# Patient Record
Sex: FEMALE | Race: WHITE | NOT HISPANIC OR LATINO | Employment: OTHER | URBAN - METROPOLITAN AREA
[De-identification: names, ages, dates, MRNs, and addresses within clinical notes are randomized per-mention and may not be internally consistent; named-entity substitution may affect disease eponyms.]

---

## 2023-01-16 DIAGNOSIS — M17.12 PRIMARY OSTEOARTHRITIS OF LEFT KNEE: Primary | ICD-10-CM

## 2023-01-16 RX ORDER — FOLIC ACID 1 MG/1
1 TABLET ORAL DAILY
Qty: 30 TABLET | Refills: 1 | Status: SHIPPED | OUTPATIENT
Start: 2023-01-16 | End: 2023-03-14

## 2023-01-16 RX ORDER — MULTIVIT WITH MINERALS/LUTEIN
1000 TABLET ORAL DAILY
Qty: 30 TABLET | Refills: 1 | Status: SHIPPED | OUTPATIENT
Start: 2023-01-16 | End: 2023-03-14

## 2023-01-16 RX ORDER — MULTIVITAMIN
1 TABLET ORAL DAILY
Qty: 30 TABLET | Refills: 1 | Status: SHIPPED | OUTPATIENT
Start: 2023-01-16 | End: 2023-03-14

## 2023-04-04 ENCOUNTER — APPOINTMENT (OUTPATIENT)
Dept: PHYSICAL THERAPY | Facility: CLINIC | Age: 68
End: 2023-04-04

## 2023-04-10 ENCOUNTER — APPOINTMENT (OUTPATIENT)
Dept: PHYSICAL THERAPY | Facility: CLINIC | Age: 68
End: 2023-04-10

## 2023-04-24 ENCOUNTER — OFFICE VISIT (OUTPATIENT)
Dept: PHYSICAL THERAPY | Facility: CLINIC | Age: 68
End: 2023-04-24

## 2023-04-24 ENCOUNTER — OFFICE VISIT (OUTPATIENT)
Dept: OBGYN CLINIC | Facility: CLINIC | Age: 68
End: 2023-04-24

## 2023-04-24 VITALS
DIASTOLIC BLOOD PRESSURE: 81 MMHG | WEIGHT: 183 LBS | BODY MASS INDEX: 33.68 KG/M2 | HEART RATE: 81 BPM | HEIGHT: 62 IN | SYSTOLIC BLOOD PRESSURE: 129 MMHG

## 2023-04-24 DIAGNOSIS — M17.12 OSTEOARTHRITIS OF LEFT KNEE, UNSPECIFIED OSTEOARTHRITIS TYPE: Primary | ICD-10-CM

## 2023-04-24 DIAGNOSIS — Z96.652 STATUS POST TOTAL LEFT KNEE REPLACEMENT: ICD-10-CM

## 2023-04-24 DIAGNOSIS — Z48.89 AFTERCARE FOLLOWING SURGERY: Primary | ICD-10-CM

## 2023-04-24 DIAGNOSIS — Z96.652 TOTAL KNEE REPLACEMENT STATUS, LEFT: ICD-10-CM

## 2023-04-24 NOTE — PROGRESS NOTES
"Patient Name:  Puja Weinstein  MRN:  [id number]    [address] [place] [address]  Aftercare following surgery    2  Status post total left knee replacement      Approximately 6 week s/p Left total knee arthroplasty performed on 03/15/2023  · Overall, patient doing well s/p surgical intervention  · Strongly advised patient to continue to work on range of motion, specifically knee extension with hamstring exercises performed 3 times daily  Continue flexion as tolerated  · Continue home exercises as prescribed by outpatient PT   · Patient does not have to continue DVT prophylaxis at this time  · Follow up in office in 6 weeks for reevaluation and new x-rays upon arrival    History of the Present Illness   Puja Weinstein is a 76 y o  female approximately 6 week s/p Left total knee arthroplasty performed on 03/15/2023  Today, patient reports she is doing well  She admits to improvement of knee pain since before surgery, but today is a little sore from therapy  She is going to PT after this appointment  Patient admits to feeling a little stiff in the Left knee, but she is doing all the home exercises as prescribed  She is out of the baby aspirin and wants to know if she needs to continue with this medication  Review of Systems     Review of Systems   Constitutional: Negative for chills and fever  HENT: Negative for congestion  Respiratory: Negative for cough, chest tightness and shortness of breath  Cardiovascular: Negative for chest pain and palpitations  Gastrointestinal: Negative for abdominal pain  Endocrine: Negative for cold intolerance and heat intolerance  Neurological: Negative for syncope  Psychiatric/Behavioral: Negative for confusion  Physical Exam     /81   Pulse 81   Ht 5' 2\" (1 575 m)   Wt 83 kg (183 lb)   BMI 33 47 kg/m²     Left Knee  Surgical incision well healing, without signs of infection  Range of motion from 7-8 to 95 degrees  There is no effusion      The " patient is able to perform a straight leg raise with 4+/5 quad strength  Mild extension lag   Calf soft, compressible and nontender  The patient is neurovascular intact distally  Data Review     I have personally reviewed pertinent films in PACS, and my interpretation follows      No new images    Social History     Tobacco Use   • Smoking status: Never   • Smokeless tobacco: Never   Vaping Use   • Vaping Use: Never used   Substance Use Topics   • Alcohol use: Never   • Drug use: No           Procedures  None     Bev Rogers PA-C

## 2023-04-24 NOTE — PROGRESS NOTES
"Daily Note     Today's date: 2023  Patient name: Cornel Love  : 1955  MRN: [id number]  Referring provider: Myrtle Carlson DO  Dx:   Encounter Diagnosis     ICD-10-CM    1  Osteoarthritis of left knee, unspecified osteoarthritis type  M17 12       2  Total knee replacement status, left  Z96 652                      Subjective: Patient reports her surgeon would like her to continue working on ROM  Pt denies complications following last visit  Objective: See treatment diary below      Assessment: AROM 8-106*, PROM 4-113*  Continues to lack TKE during CKC  Unequal weight distribution during mini squats (L>R)  Patient completes charted exercises without complications  Will continue to progress ROM and strength as able in order to improve overall function for daily activities  Plan: Continue per plan of care        Precautions: pre-op Left TKA pending 3-15-23 secondary to severe DJD      Manuals 3/8 3/20 3/23 3/30 4/6 4/11 4/13 4/17 4/20 4/24   PROM L knee  SD MM AF SC AFB SK AF AF AF   Patellar ROM  nv MM  SC AFB SK AF AF AF                             Neuro Re-Ed             QS, GS 3s x 20 5s x 20 5s x 20 x20 5\"x20 5\"x20 5\"x20 5\"x20 5\"x20 5\"x20   TKE         GTB x20 GTB x20   Biodex LOS         Static x3 L12 x3                                          Ther Ex             bike  nv 5' 5' ROM 5' ROM 5' ROM 5' ROM 5' ROM 5' ROM 5' ROM   Supine abd 20x            Supine heel slides 20x 10s x 10 10s x10 x20 10\"x10 AA  x20 A 10s x10 10s x10 x20 A/AA x20 A/AA x20 A/AA   saq   5\" 2x10 5\"x20    2# 2x10 2# 2x10 3# 3x10   slr      w QS  4x5 w QS 2x10 2x10 2x10 2# 2x10   Calf stretch  10s x 10 10s x10          Hamstring stretch  10s x 10 10s x10 30\"x3         LAQ     2x10 2x10 2x10 2# 2x10 2# 3x10 3# 3x10   Bridges             HR   20x          Hamstring curl   2x10 ea   2x10 bilat 3x10 bilat 3x10 b/l     Standing hip 3 way      abd/  Ext  2x10 bilat abd/  Ext  3x10  bilat abd/ext 3x10 b/l RTB 2x10 " "RTB 2x10   Mini squat         2x10 2x10                             Ther Activity             Step ups        2x10 4\" 6\" 2X10 6\" 2x10   Sit to stands        2x10 2x10 2x10   Gait Training                                       Modalities             Cp     10'                           "

## 2023-04-27 ENCOUNTER — APPOINTMENT (OUTPATIENT)
Dept: PHYSICAL THERAPY | Facility: CLINIC | Age: 68
End: 2023-04-27

## 2023-04-28 ENCOUNTER — OFFICE VISIT (OUTPATIENT)
Dept: PHYSICAL THERAPY | Facility: CLINIC | Age: 68
End: 2023-04-28

## 2023-04-28 DIAGNOSIS — Z96.652 TOTAL KNEE REPLACEMENT STATUS, LEFT: ICD-10-CM

## 2023-04-28 DIAGNOSIS — M17.12 OSTEOARTHRITIS OF LEFT KNEE, UNSPECIFIED OSTEOARTHRITIS TYPE: Primary | ICD-10-CM

## 2023-04-28 NOTE — PROGRESS NOTES
"Daily Note     Today's date: 2023  Patient name: Peyton Santos  : 1955  MRN: [id number]  Referring provider: Quinton Arevalo DO  Dx:   Encounter Diagnosis     ICD-10-CM    1  Osteoarthritis of left knee, unspecified osteoarthritis type  M17 12       2  Total knee replacement status, left  Z96 652                      Subjective: Pt offers no new complaints regarding L knee upon arrival to session  Objective: See treatment diary below      Assessment:  AROM L knee 8-104*, PROM 0-115*  Pt with lateral trunk lean during standing hip strengthening  Requires VC to avoid quad dominant squats and knee valgus  Patient completes charted exercises without c/o pain or discomfort  Pt would benefit from continued PT in order to improve LLE strength and stability for improved function and safety during daily activities  Plan: Continue per plan of care        Precautions: pre-op Left TKA pending 3-15-23 secondary to severe DJD      Manuals 4/28 3/20 3/23 3/30 4/6 4/11 4/13 4/17 4/20 4/24   PROM L knee AF SD MM AF SC AFB SK AF AF AF   Patellar ROM  nv MM  SC AFB SK AF AF AF                             Neuro Re-Ed             QS, GS  5s x 20 5s x 20 x20 5\"x20 5\"x20 5\"x20 5\"x20 5\"x20 5\"x20   TKE BTB x20        GTB x20 GTB x20   Biodex LOS L12 x3        Static x3 L12 x3                                          Ther Ex             bike 5' ROM nv 5' 5' ROM 5' ROM 5' ROM 5' ROM 5' ROM 5' ROM 5' ROM   Supine abd             Supine heel slides x20 A/AA 10s x 10 10s x10 x20 10\"x10 AA  x20 A 10s x10 10s x10 x20 A/AA x20 A/AA x20 A/AA   saq 3# 3x10  5\" 2x10 5\"x20    2# 2x10 2# 2x10 3# 3x10   slr 2# 2x10     w QS  4x5 w QS 2x10 2x10 2x10 2# 2x10   Calf stretch  10s x 10 10s x10          Hamstring stretch 30\"x3 10s x 10 10s x10 30\"x3         LAQ 3# 3x10    2x10 2x10 2x10 2# 2x10 2# 3x10 3# 3x10   Bridges             HR   20x          Hamstring curl   2x10 ea   2x10 bilat 3x10 bilat 3x10 b/l     Standing hip 3 way " "RTB 2x10     abd/  Ext  2x10 bilat abd/  Ext  3x10  bilat abd/ext 3x10 b/l RTB 2x10 RTB 2x10   Mini squat 2x10        2x10 2x10   Leg press 75# 3x10                         Ther Activity             Step ups 6\" 2x10       2x10 4\" 6\" 2X10 6\" 2x10   Sit to stands 2x12       2x10 2x10 2x10   Gait Training                                       Modalities             Cp     10'                           "

## 2023-05-01 ENCOUNTER — EVALUATION (OUTPATIENT)
Dept: PHYSICAL THERAPY | Facility: CLINIC | Age: 68
End: 2023-05-01

## 2023-05-01 DIAGNOSIS — Z96.652 TOTAL KNEE REPLACEMENT STATUS, LEFT: ICD-10-CM

## 2023-05-01 DIAGNOSIS — M17.12 OSTEOARTHRITIS OF LEFT KNEE, UNSPECIFIED OSTEOARTHRITIS TYPE: Primary | ICD-10-CM

## 2023-05-01 NOTE — PROGRESS NOTES
PT Re-Evaluation     Today's date: 2023  Patient name: Kandy Wagner  : 1955  MRN: [id number]  Referring provider: Thu Lo DO  Dx:   Encounter Diagnosis     ICD-10-CM    1  Osteoarthritis of left knee, unspecified osteoarthritis type  M17 12       2  Total knee replacement status, left  Z96 652           Start Time: 1630  Stop Time: 5160  Total time in clinic (min): 45 minutes    Assessment  Assessment details: Pt is a 76 y o  female presenting to PT services s/p L TKA on 3/15/2023  Pt has been participating in PT for 6 weeks and has made notable improvement in regards to L knee mobility, LLE strength, decreased pain levels, and improved functional ability  Pt is no longer considered a fall risk, evidenced by score on TUG without AD  Although improved, pt continues to be limited by L knee AROM and is lacking both flexion and TKE  Additionally, pt has difficulty ascending and descending stairs and performs this non-reciprocally  PT and pt have discussed and agreed that pt is a good candidate for continued skilled physical therapy in order to improve L knee mobility, decrease pain with function, increase LLE strength, and improve functional ability in preparation for discharge to independent [facility]  Impairments: abnormal or restricted ROM, activity intolerance, impaired balance, impaired physical strength and pain with function  Understanding of Dx/Px/POC: good  Goals  STG   1  Patient will demonstrate independence and competence with HEP 2 -4 weeks GOAL MET  2  Patient will report > 25-50% reduced pain 2-4 weeks GOAL MET  3  Patient will demonstrate L knee ROM  degrees in 4 weeks  GOAL MET  4  Patient to improve L hip and knee strength by 1/2 grade  GOAL MET    LTG   1  Patient will report improvements with both functional and recreational abilities  4-6 weeks GOAL MET  2  Patient will demonstrate improved motor function  4-6 weeks  GOAL MET  3   Restoration of full / functional painfree left knee AROM (0-120)  6-10 weeks  ONGOING  4  Patient to negotiate stairs reciprocally  By DC  ONGOING  5  Patient to ambulate community distances without use of AD  By DC PARTIALLY MET  6  Patient to increase strength in L knee and hip by 1 full grade to improve ability to perform STS transfers  By Mihai Brooks  Patient would benefit from: skilled physical therapy  Planned modality interventions: cryotherapy  Planned therapy interventions: IADL retraining, manual therapy, patient education, postural training, strengthening, stretching, therapeutic exercise, flexibility, home exercise program, neuromuscular re-education and gait training  Frequency: 2-1x/week  Duration in weeks: 6  Plan of Care beginning date: 2023  Plan of Care expiration date: 2023  Treatment plan discussed with: patient        Subjective Evaluation    History of Present Illness  Mechanism of injury: Pt reports that she feels about 80% better since beginning PT  Pt states that her knee movement has gotten better but it still gets stiff in there a lot  Pt states that her strength is good  Pt states that she really doesn't use her SPC anymore and she feels good without it, she does take it if it's raining     Pain  Current pain rating: 3  At best pain ratin  At worst pain ratin  Location: anterior L knee   Quality: tight and pulling  Relieving factors: ice (working out, walking, Tylenol prn)  Aggravating factors: stair climbing (prolonged sitting)  Progression: improved    Social Support  Lives with: adult children (will be alone most of the time after surgery )    Treatments  Current treatment: physical therapy  Patient Goals  Patient goals for therapy: decreased pain, increased motion and independence with ADLs/IADLs          Objective     Active Range of Motion   Left Knee   Flexion: 112 (pull) degrees   Extension: 8 degrees     Right Knee   Flexion: 118 degrees   Extension: 0 degrees     Passive Range of Motion "  Left Knee   Flexion: 118 degrees   Extension: 0 degrees     Strength/Myotome Testing     Left Knee   Flexion: 5  Extension: 5  Quadriceps contraction: good    Right Knee   Normal strength  Flexion: 5  Extension: 5  Quadriceps contraction: good    General Comments:      Knee Comments  TUG  10 22 seconds no AD              Precautions: Left TKA 3-15-23 secondary to severe DJD      Manuals 4/28 5/1 3/23 3/30 4/6 4/11 4/13 4/17 4/20 4/24   PROM L knee AF SC MM AF SC AFB SK AF AF AF   Patellar ROM   MM  SC AFB SK AF AF AF                             Neuro Re-Ed             QS, GS  5\"x20 5s x 20 x20 5\"x20 5\"x20 5\"x20 5\"x20 5\"x20 5\"x20   TKE BTB x20        GTB x20 GTB x20   Biodex LOS L12 x3        Static x3 L12 x3                                          Ther Ex             bike 5' ROM 6' ROM endurance 5' 5' ROM 5' ROM 5' ROM 5' ROM 5' ROM 5' ROM 5' ROM   Supine abd             Supine heel slides x20 A/AA x20 AA 10s x10 x20 10\"x10 AA  x20 A 10s x10 10s x10 x20 A/AA x20 A/AA x20 A/AA   saq 3# 3x10  5\" 2x10 5\"x20    2# 2x10 2# 2x10 3# 3x10   slr 2# 2x10 3x10    w QS  4x5 w QS 2x10 2x10 2x10 2# 2x10   Calf stretch   10s x10          Hamstring stretch 30\"x3 supine c strap 30\"x3 10s x10 30\"x3         LAQ 3# 3x10    2x10 2x10 2x10 2# 2x10 2# 3x10 3# 3x10   Bridges             HR   20x          Hamstring curl   2x10 ea   2x10 bilat 3x10 bilat 3x10 b/l     Standing hip 3 way RTB 2x10     abd/  Ext  2x10 bilat abd/  Ext  3x10  bilat abd/ext 3x10 b/l RTB 2x10 RTB 2x10   Mini squat 2x10        2x10 2x10   Leg press 75# 3x10 75# x25                        Ther Activity             Step ups 6\" 2x10       2x10 4\" 6\" 2X10 6\" 2x10   Sit to stands 2x12       2x10 2x10 2x10   Gait Training                                       Modalities             Cp     10'                           "

## 2023-05-04 ENCOUNTER — OFFICE VISIT (OUTPATIENT)
Dept: PHYSICAL THERAPY | Facility: CLINIC | Age: 68
End: 2023-05-04

## 2023-05-04 DIAGNOSIS — M17.12 OSTEOARTHRITIS OF LEFT KNEE, UNSPECIFIED OSTEOARTHRITIS TYPE: Primary | ICD-10-CM

## 2023-05-04 DIAGNOSIS — Z96.652 TOTAL KNEE REPLACEMENT STATUS, LEFT: ICD-10-CM

## 2023-05-04 NOTE — PROGRESS NOTES
"Daily Note     Today's date: 2023  Patient name: Abdiel Ace  : 1955  MRN: [id number]  Referring provider: Heather Meade DO  Dx:   Encounter Diagnosis     ICD-10-CM    1  Osteoarthritis of left knee, unspecified osteoarthritis type  M17 12       2  Total knee replacement status, left  Z96 652                      Subjective: Pt feels L knee is a little stiff upon arrival   Denies soreness following last session  Objective: See treatment diary below      Assessment: AROM L knee *  Requires TC to facilitate TKE during functional activities  Able to perform step up without hand rail but close supervision provided for safety due to lack of motor control  Continued quad strength and ROM deficits  Pt would benefit from continued PT in order to improve L knee ROM and strength for improved function during daily activities  Plan: Continue per plan of care        Precautions: Left TKA 3-15-23 secondary to severe DJD      Manuals 4/28 5/1 5/4 3/30 4/6 4/11 4/13 4/17 4/20 4/24   PROM L knee AF SC AF AF SC AFB SK AF AF AF   Patellar ROM     SC AFB SK AF AF AF                             Neuro Re-Ed             QS, GS  5\"x20  x20 5\"x20 5\"x20 5\"x20 5\"x20 5\"x20 5\"x20   TKE BTB x20  BTB x20      GTB x20 GTB x20   Biodex LOS L12 x3  L12 x4      Static x3 L12 x3                                          Ther Ex             bike 5' ROM 6' ROM endurance 5' ROM 5' ROM 5' ROM 5' ROM 5' ROM 5' ROM 5' ROM 5' ROM   Supine abd             Supine heel slides x20 A/AA x20 AA x20 AA/A x20 10\"x10 AA  x20 A 10s x10 10s x10 x20 A/AA x20 A/AA x20 A/AA   saq 3# 3x10  4# 3x10 5\"x20    2# 2x10 2# 2x10 3# 3x10   slr 2# 2x10 3x10    w QS  4x5 w QS 2x10 2x10 2x10 2# 2x10   Calf stretch             Hamstring stretch 30\"x3 supine c strap 30\"x3 30\"x3 30\"x3         LAQ 3# 3x10  4# 3x10  2x10 2x10 2x10 2# 2x10 2# 3x10 3# 3x10   Bridges             HR             Hamstring curl      2x10 bilat 3x10 bilat 3x10 b/l   " "  Standing hip 3 way RTB 2x10  RTB 2x10   abd/  Ext  2x10 bilat abd/  Ext  3x10  bilat abd/ext 3x10 b/l RTB 2x10 RTB 2x10   Mini squat 2x10  TRX 2x10      2x10 2x10   Leg press 75# 3x10 75# x25 75# 3x10                       Ther Activity             Step ups 6\" 2x10  8\" 2x10     2x10 4\" 6\" 2X10 6\" 2x10   Sit to stands 2x12  2x12     2x10 2x10 2x10   Sled pull   75# 2 laps          Gait Training                                       Modalities             Cp     10'                                "

## 2023-05-08 ENCOUNTER — APPOINTMENT (OUTPATIENT)
Dept: PHYSICAL THERAPY | Facility: CLINIC | Age: 68
End: 2023-05-08
Payer: COMMERCIAL

## 2023-05-11 ENCOUNTER — OFFICE VISIT (OUTPATIENT)
Dept: PHYSICAL THERAPY | Facility: CLINIC | Age: 68
End: 2023-05-11

## 2023-05-11 DIAGNOSIS — Z96.652 TOTAL KNEE REPLACEMENT STATUS, LEFT: ICD-10-CM

## 2023-05-11 DIAGNOSIS — M17.12 OSTEOARTHRITIS OF LEFT KNEE, UNSPECIFIED OSTEOARTHRITIS TYPE: Primary | ICD-10-CM

## 2023-05-11 NOTE — PROGRESS NOTES
"Daily Note     Today's date: 2023  Patient name: Rosie Frazier  : 1955  MRN: [id number]  Referring provider: Juaquin Pope DO  Dx:   Encounter Diagnosis     ICD-10-CM    1  Osteoarthritis of left knee, unspecified osteoarthritis type  M17 12       2  Total knee replacement status, left  Z96 652           Start Time: 1630  Stop Time: 1716  Total time in clinic (min): 46 minutes    Subjective: Pt reports that the only issue she is having anymore is when she sits for too long her knee gets stiff  Objective: See treatment diary below      Assessment: AROM L knee 4-107*  PROM 0*-110*  Pt is progressing well, will continue to address functional quadriceps strengthening  Pt continues to lack TKE  Pt would benefit from continued PT in order to improve L knee ROM and strength for improved function during daily activities  Plan: Continue per plan of care        Precautions: Left TKA 3-15-23 secondary to severe DJD    POC expires Auth Status Unit limit Start date  Expiration date PT/OT + Visit Limit?   23 Approved - 12 visits  BOMN 23 BOMN                                         Manuals    PROM L knee AF SC AF  SC AFB SK AF AF AF   Patellar ROM     SC AFB SK AF AF AF                             Neuro Re-Ed             QS, GS  5\"x20  Prone 5\"x20 5\"x20 5\"x20 5\"x20 5\"x20 5\"x20 5\"x20   TKE BTB x20  BTB x20 BTB x20     GTB x20 GTB x20   Biodex LOS L12 x3  L12 x4 L11/10 x4     Static x3 L12 x3                                          Ther Ex             bike 5' ROM 6' ROM endurance 5' ROM 5' ROM 5' ROM 5' ROM 5' ROM 5' ROM 5' ROM 5' ROM   Supine abd             Supine heel slides x20 A/AA x20 AA x20 AA/A x20 AA/A 10\"x10 AA  x20 A 10s x10 10s x10 x20 A/AA x20 A/AA x20 A/AA   saq 3# 3x10  4# 3x10     2# 2x10 2# 2x10 3# 3x10   slr 2# 2x10 3x10  4# 2x10  w QS  4x5 w QS 2x10 2x10 2x10 2# 2x10   Calf stretch             Hamstring stretch 30\"x3 " "supine c strap 30\"x3 30\"x3          LAQ 3# 3x10  4# 3x10 4# 3x10 2x10 2x10 2x10 2# 2x10 2# 3x10 3# 3x10   Bridges             HR             Hamstring curl      2x10 bilat 3x10 bilat 3x10 b/l     Standing hip 3 way RTB 2x10  RTB 2x10   abd/  Ext  2x10 bilat abd/  Ext  3x10  bilat abd/ext 3x10 b/l RTB 2x10 RTB 2x10   Mini squat 2x10  TRX 2x10 TRX 2x10     2x10 2x10   Leg press 75# 3x10 75# x25 75# 3x10                       Ther Activity             Step ups 6\" 2x10  8\" 2x10     2x10 4\" 6\" 2X10 6\" 2x10   Sit to stands 2x12  2x12 YMB 2x10    2x10 2x10 2x10   Sled pull   75# 2 laps 7# 5 laps         Gait Training                                       Modalities             Cp                                     "

## 2023-05-13 DIAGNOSIS — E87.6 HYPOKALEMIA: ICD-10-CM

## 2023-05-15 ENCOUNTER — APPOINTMENT (OUTPATIENT)
Dept: PHYSICAL THERAPY | Facility: CLINIC | Age: 68
End: 2023-05-15
Payer: COMMERCIAL

## 2023-05-15 RX ORDER — POTASSIUM CHLORIDE 750 MG/1
TABLET, EXTENDED RELEASE ORAL
Qty: 180 TABLET | Refills: 1 | Status: SHIPPED | OUTPATIENT
Start: 2023-05-15

## 2023-05-17 DIAGNOSIS — F32.A DEPRESSION, UNSPECIFIED DEPRESSION TYPE: ICD-10-CM

## 2023-05-17 DIAGNOSIS — I10 HYPERTENSION, BENIGN: ICD-10-CM

## 2023-05-17 RX ORDER — NIFEDIPINE 60 MG/1
60 TABLET, EXTENDED RELEASE ORAL DAILY
Qty: 90 TABLET | Refills: 1 | Status: SHIPPED | OUTPATIENT
Start: 2023-05-17

## 2023-05-18 ENCOUNTER — OFFICE VISIT (OUTPATIENT)
Dept: PHYSICAL THERAPY | Facility: CLINIC | Age: 68
End: 2023-05-18

## 2023-05-18 DIAGNOSIS — M17.12 OSTEOARTHRITIS OF LEFT KNEE, UNSPECIFIED OSTEOARTHRITIS TYPE: Primary | ICD-10-CM

## 2023-05-18 DIAGNOSIS — Z96.652 TOTAL KNEE REPLACEMENT STATUS, LEFT: ICD-10-CM

## 2023-05-18 DIAGNOSIS — F51.04 CHRONIC INSOMNIA: ICD-10-CM

## 2023-05-18 NOTE — PROGRESS NOTES
"Daily Note     Today's date: 2023  Patient name: Annabelle Copeland  : 1955  MRN: [id number]  Referring provider: Cristian Gomez DO  Dx:   Encounter Diagnosis     ICD-10-CM    1  Osteoarthritis of left knee, unspecified osteoarthritis type  M17 12       2  Total knee replacement status, left  Z96 652                      Subjective: Pt denies pain or discomfort upon arrival   She notes she does experience \"tingling\" in L knee at times  Objective: See treatment diary below      Assessment: Improved TKE but demonstrates quad lag with fatigue during SLR  Patient requires min A VC to facilitate proper exercise form  AROM 0-110* today which is near equal to R knee  Decreased eccentric control  Plan: Continue per plan of care        Precautions: Left TKA 3-15-23 secondary to severe DJD    POC expires Auth Status Unit limit Start date  Expiration date PT/OT + Visit Limit?   23 Approved - 12 visits  BOMN 23 BOMN                                         Manuals    PROM L knee AF SC AF  AF AFB SK AF AF AF   Patellar ROM      AFB SK AF AF AF                             Neuro Re-Ed             QS, GS  5\"x20  Prone 5\"x20  5\"x20 5\"x20 5\"x20 5\"x20 5\"x20   TKE BTB x20  BTB x20 BTB x20 BTB x30    GTB x20 GTB x20   Biodex LOS L12 x3  L12 x4 L11/10 x4 L7 x4    Static x3 L12 x3   SLS     15\"x4                                  Ther Ex             bike 5' ROM 6' ROM endurance 5' ROM 5' ROM 5' ROM 5' ROM 5' ROM 5' ROM 5' ROM 5' ROM   Supine abd             Supine heel slides x20 A/AA x20 AA x20 AA/A x20 AA/A manual 10s x10 10s x10 x20 A/AA x20 A/AA x20 A/AA   saq 3# 3x10  4# 3x10  5# 3x10   2# 2x10 2# 2x10 3# 3x10   slr 2# 2x10 3x10  4# 2x10 5# 2x10 w QS  4x5 w QS 2x10 2x10 2x10 2# 2x10   Calf stretch             Hamstring stretch 30\"x3 supine c strap 30\"x3 30\"x3          LAQ 3# 3x10  4# 3x10 4# 3x10 5# 3x10 2x10 2x10 2# 2x10 2# 3x10 3# 3x10   Bridges  " "           HR             Hamstring curl      2x10 bilat 3x10 bilat 3x10 b/l     Standing hip 3 way RTB 2x10  RTB 2x10   abd/  Ext  2x10 bilat abd/  Ext  3x10  bilat abd/ext 3x10 b/l RTB 2x10 RTB 2x10   Mini squat 2x10  TRX 2x10 TRX 2x10 TRX 2x10    2x10 2x10   Leg press 75# 3x10 75# x25 75# 3x10                       Ther Activity             Step ups 6\" 2x10  8\" 2x10     2x10 4\" 6\" 2X10 6\" 2x10   Sit to stands 2x12  2x12 YMB 2x10 BMB 2x10   2x10 2x10 2x10   Sled pull   75# 2 laps 7# 5 laps NV        Step down     6\" x20        Gait Training                                       Modalities             Cp                                       "

## 2023-05-19 DIAGNOSIS — F32.A DEPRESSION, UNSPECIFIED DEPRESSION TYPE: ICD-10-CM

## 2023-05-19 DIAGNOSIS — F51.04 CHRONIC INSOMNIA: ICD-10-CM

## 2023-05-19 RX ORDER — ZOLPIDEM TARTRATE 5 MG/1
5 TABLET ORAL
Qty: 90 TABLET | Refills: 1 | Status: SHIPPED | OUTPATIENT
Start: 2023-05-19

## 2023-05-19 RX ORDER — ZOLPIDEM TARTRATE 5 MG/1
5 TABLET ORAL
Qty: 90 TABLET | Refills: 1 | OUTPATIENT
Start: 2023-05-19

## 2023-05-19 NOTE — TELEPHONE ENCOUNTER
Controlled Substance Review    [place] PDMP or [place]  reviewed: No red flags were identified; safe to proceed with prescription  Cristine Gibbs

## 2023-05-19 NOTE — TELEPHONE ENCOUNTER
Patient needs the zolpidem 5 mg tablet, take 1 tablet (5mg total) by mouth daily at bedtime as needed for sleep, sent to [address] [facility] [facility]      Call back #[phone]

## 2023-05-22 ENCOUNTER — APPOINTMENT (OUTPATIENT)
Dept: PHYSICAL THERAPY | Facility: CLINIC | Age: 68
End: 2023-05-22
Payer: COMMERCIAL

## 2023-05-22 ENCOUNTER — TELEPHONE (OUTPATIENT)
Dept: OBGYN CLINIC | Facility: CLINIC | Age: 68
End: 2023-05-22

## 2023-05-22 NOTE — TELEPHONE ENCOUNTER
Called and left message  Dr Jenny Caraballo will be out the morning of your appointment on June 5th  Please call to reschedule at [phone]

## 2023-05-24 ENCOUNTER — NURSE TRIAGE (OUTPATIENT)
Dept: OTHER | Facility: OTHER | Age: 68
End: 2023-05-24

## 2023-05-24 ENCOUNTER — HOSPITAL ENCOUNTER (EMERGENCY)
Facility: HOSPITAL | Age: 68
Discharge: HOME | End: 2023-05-25
Attending: EMERGENCY MEDICINE

## 2023-05-24 DIAGNOSIS — R10.9 ABDOMINAL PAIN, VOMITING, AND DIARRHEA: Primary | ICD-10-CM

## 2023-05-24 DIAGNOSIS — R19.7 ABDOMINAL PAIN, VOMITING, AND DIARRHEA: Primary | ICD-10-CM

## 2023-05-24 DIAGNOSIS — R11.10 ABDOMINAL PAIN, VOMITING, AND DIARRHEA: Primary | ICD-10-CM

## 2023-05-24 LAB
BASOPHILS # BLD AUTO: 0.03 THOUSANDS/ÂΜL (ref 0–0.1)
BASOPHILS NFR BLD AUTO: 1 % (ref 0–1)
EOSINOPHIL # BLD AUTO: 0.15 THOUSAND/ÂΜL (ref 0–0.61)
EOSINOPHIL NFR BLD AUTO: 2 % (ref 0–6)
ERYTHROCYTE [DISTWIDTH] IN BLOOD BY AUTOMATED COUNT: 13.6 % (ref 11.6–15.1)
HCT VFR BLD AUTO: 33.9 % (ref 34.8–46.1)
HGB BLD-MCNC: 11.4 G/DL (ref 11.5–15.4)
IMM GRANULOCYTES # BLD AUTO: 0.01 THOUSAND/UL (ref 0–0.2)
IMM GRANULOCYTES NFR BLD AUTO: 0 % (ref 0–2)
LYMPHOCYTES # BLD AUTO: 1.63 THOUSANDS/ÂΜL (ref 0.6–4.47)
LYMPHOCYTES NFR BLD AUTO: 25 % (ref 14–44)
MCH RBC QN AUTO: 29.6 PG (ref 26.8–34.3)
MCHC RBC AUTO-ENTMCNC: 33.6 G/DL (ref 31.4–37.4)
MCV RBC AUTO: 88 FL (ref 82–98)
MONOCYTES # BLD AUTO: 0.53 THOUSAND/ÂΜL (ref 0.17–1.22)
MONOCYTES NFR BLD AUTO: 8 % (ref 4–12)
NEUTROPHILS # BLD AUTO: 4.15 THOUSANDS/ÂΜL (ref 1.85–7.62)
NEUTS SEG NFR BLD AUTO: 64 % (ref 43–75)
NRBC BLD AUTO-RTO: 0 /100 WBCS
PLATELET # BLD AUTO: 231 THOUSANDS/UL (ref 149–390)
PMV BLD AUTO: 8.8 FL (ref 8.9–12.7)
RBC # BLD AUTO: 3.85 MILLION/UL (ref 3.81–5.12)
WBC # BLD AUTO: 6.5 THOUSAND/UL (ref 4.31–10.16)

## 2023-05-24 RX ORDER — ONDANSETRON 2 MG/ML
4 INJECTION INTRAMUSCULAR; INTRAVENOUS ONCE
Status: COMPLETED | OUTPATIENT
Start: 2023-05-24 | End: 2023-05-24

## 2023-05-24 RX ORDER — FENTANYL CITRATE 50 UG/ML
25 INJECTION, SOLUTION INTRAMUSCULAR; INTRAVENOUS ONCE
Status: COMPLETED | OUTPATIENT
Start: 2023-05-24 | End: 2023-05-24

## 2023-05-24 RX ADMIN — SODIUM CHLORIDE 1000 ML: 0.9 INJECTION, SOLUTION INTRAVENOUS at 23:39

## 2023-05-24 RX ADMIN — FENTANYL CITRATE 25 MCG: 50 INJECTION, SOLUTION INTRAMUSCULAR; INTRAVENOUS at 23:30

## 2023-05-24 RX ADMIN — ONDANSETRON 4 MG: 2 INJECTION INTRAMUSCULAR; INTRAVENOUS at 23:30

## 2023-05-25 ENCOUNTER — APPOINTMENT (EMERGENCY)
Dept: CT IMAGING | Facility: HOSPITAL | Age: 68
End: 2023-05-25

## 2023-05-25 ENCOUNTER — APPOINTMENT (OUTPATIENT)
Dept: PHYSICAL THERAPY | Facility: CLINIC | Age: 68
End: 2023-05-25
Payer: COMMERCIAL

## 2023-05-25 VITALS
TEMPERATURE: 97.7 F | OXYGEN SATURATION: 93 % | RESPIRATION RATE: 19 BRPM | DIASTOLIC BLOOD PRESSURE: 81 MMHG | HEART RATE: 79 BPM | SYSTOLIC BLOOD PRESSURE: 157 MMHG

## 2023-05-25 LAB
2HR DELTA HS TROPONIN: 0 NG/L
ALBUMIN SERPL BCP-MCNC: 4.2 G/DL (ref 3.5–5)
ALP SERPL-CCNC: 82 U/L (ref 34–104)
ALT SERPL W P-5'-P-CCNC: 10 U/L (ref 7–52)
ANION GAP SERPL CALCULATED.3IONS-SCNC: 8 MMOL/L (ref 4–13)
AST SERPL W P-5'-P-CCNC: 16 U/L (ref 13–39)
ATRIAL RATE: 79 BPM
ATRIAL RATE: 85 BPM
BILIRUB SERPL-MCNC: 0.38 MG/DL (ref 0.2–1)
BUN SERPL-MCNC: 17 MG/DL (ref 5–25)
CALCIUM SERPL-MCNC: 9.6 MG/DL (ref 8.4–10.2)
CARDIAC TROPONIN I PNL SERPL HS: 7 NG/L
CARDIAC TROPONIN I PNL SERPL HS: 7 NG/L
CHLORIDE SERPL-SCNC: 106 MMOL/L (ref 96–108)
CO2 SERPL-SCNC: 24 MMOL/L (ref 21–32)
CREAT SERPL-MCNC: 0.78 MG/DL (ref 0.6–1.3)
GFR SERPL CREATININE-BSD FRML MDRD: 78 ML/MIN/1.73SQ M
GLUCOSE SERPL-MCNC: 135 MG/DL (ref 65–140)
LACTATE SERPL-SCNC: 1.3 MMOL/L (ref 0.5–2)
LIPASE SERPL-CCNC: 10 U/L (ref 11–82)
P AXIS: 44 DEGREES
P AXIS: 60 DEGREES
POTASSIUM SERPL-SCNC: 3.6 MMOL/L (ref 3.5–5.3)
PR INTERVAL: 188 MS
PR INTERVAL: 202 MS
PROT SERPL-MCNC: 7.2 G/DL (ref 6.4–8.4)
QRS AXIS: 76 DEGREES
QRS AXIS: 93 DEGREES
QRSD INTERVAL: 82 MS
QRSD INTERVAL: 88 MS
QT INTERVAL: 382 MS
QT INTERVAL: 386 MS
QTC INTERVAL: 442 MS
QTC INTERVAL: 454 MS
SODIUM SERPL-SCNC: 138 MMOL/L (ref 135–147)
T WAVE AXIS: 29 DEGREES
T WAVE AXIS: 33 DEGREES
VENTRICULAR RATE: 79 BPM
VENTRICULAR RATE: 85 BPM

## 2023-05-25 RX ORDER — DICYCLOMINE HCL 20 MG
20 TABLET ORAL 2 TIMES DAILY PRN
Qty: 20 TABLET | Refills: 0 | Status: SHIPPED | OUTPATIENT
Start: 2023-05-25

## 2023-05-25 RX ORDER — METOCLOPRAMIDE HYDROCHLORIDE 5 MG/ML
10 INJECTION INTRAMUSCULAR; INTRAVENOUS ONCE
Status: COMPLETED | OUTPATIENT
Start: 2023-05-25 | End: 2023-05-25

## 2023-05-25 RX ORDER — ONDANSETRON 4 MG/1
4 TABLET, ORALLY DISINTEGRATING ORAL EVERY 8 HOURS PRN
Qty: 10 TABLET | Refills: 0 | Status: SHIPPED | OUTPATIENT
Start: 2023-05-25 | End: 2023-05-25 | Stop reason: SDUPTHER

## 2023-05-25 RX ORDER — DICYCLOMINE HCL 20 MG
20 TABLET ORAL 2 TIMES DAILY PRN
Qty: 20 TABLET | Refills: 0 | Status: SHIPPED | OUTPATIENT
Start: 2023-05-25 | End: 2023-05-25 | Stop reason: SDUPTHER

## 2023-05-25 RX ORDER — DIPHENHYDRAMINE HYDROCHLORIDE 50 MG/ML
25 INJECTION INTRAMUSCULAR; INTRAVENOUS ONCE
Status: COMPLETED | OUTPATIENT
Start: 2023-05-25 | End: 2023-05-25

## 2023-05-25 RX ORDER — ONDANSETRON 4 MG/1
4 TABLET, ORALLY DISINTEGRATING ORAL EVERY 8 HOURS PRN
Qty: 10 TABLET | Refills: 0 | Status: SHIPPED | OUTPATIENT
Start: 2023-05-25

## 2023-05-25 RX ADMIN — METOCLOPRAMIDE 10 MG: 5 INJECTION, SOLUTION INTRAMUSCULAR; INTRAVENOUS at 02:00

## 2023-05-25 RX ADMIN — IOHEXOL 100 ML: 350 INJECTION, SOLUTION INTRAVENOUS at 00:26

## 2023-05-25 RX ADMIN — DIPHENHYDRAMINE HYDROCHLORIDE 25 MG: 50 INJECTION, SOLUTION INTRAMUSCULAR; INTRAVENOUS at 02:00

## 2023-05-25 NOTE — TELEPHONE ENCOUNTER
"  Reason for Disposition  • [1] MODERATE vomiting (e g , 3 - 5 times/day) AND [2] age > 60 years    Answer Assessment - Initial Assessment Questions  1  VOMITING SEVERITY: \"How many times have you vomited in the past 24 hours? \"      - MILD:  1 - 2 times/day     - MODERATE: 3 - 5 times/day, decreased oral intake without significant weight loss or symptoms of dehydration     - SEVERE: 6 or more times/day, vomits everything or nearly everything, with significant weight loss, symptoms of dehydration       She cant  keep down solids or liquids  for the last two days  Vomitting at least Three times a day for the last two days  2  ONSET: \"When did the vomiting begin? \"       Two days ago  3  FLUIDS: \"What fluids or food have you vomited up today? \" \"Have you been able to keep any fluids down? \"      She is also vomiting sips of fluids for two days  4  ABDOMINAL PAIN: \"Are your having any abdominal pain? \" If yes : \"How bad is it and what does it feel like? \" (e g , crampy, dull, intermittent, constant)       No abdominal pain  5  DIARRHEA: \"Is there any diarrhea? \" If Yes, ask: \"How many times today? \"       She has had three loose stools today and yesterday  6  CONTACTS: \"Is there anyone else in the family with the same symptoms? \"       no  7  CAUSE: \"What do you think is causing your vomiting? \"      unsure  8  HYDRATION STATUS: \"Any signs of dehydration? \" (e g , dry mouth [not only dry lips], too weak to stand) \"When did you last urinate? \"      Last void was a few minutes ago  No decrease in urination  9  OTHER SYMPTOMS: \"Do you have any other symptoms? \" (e g , fever, headache, vertigo, vomiting blood or coffee grounds, recent head injury)      Headache intermittent      Protocols used: VOMITING-ADULT-AH    "

## 2023-05-25 NOTE — ED PROVIDER NOTES
"History  Chief Complaint   Patient presents with   • Vomiting     \"I've been trying to eat for days but everything keeps coming back up  And I have diarrhea  \" Denies belly sxs aside from   No fevers  76 y o  female presents with a chief complaint of \"I eat but everything just comes back up  \"    Patient affirms 2 days of upper abdominal pain without radiation  Patient describes pain that came on after the vomiting and continues in the ER  Patient notes a remote history of \"acid reflux\" and states she had a similar episode at that time but no issues since  Patient affirms vomiting that occur with any eating  Patient affirms diarrhea  Patient notes last bowel movement was right before coming here  Patient denies urinary symptoms  Patient denies vaginal bleeding or discharge  Patient denies contacts with similar symptoms  Patient denies any recent use of antibiotics, international travel, or trauma  Patient notes history of c-sections  Focused Objective Exam:  Abdomen:  Inspection of an obese abdomen with previous abdominal surgical incisions noted without erythema, rashes or ecchymosis noted  No abdominal pulsations noted  Normal bowel sounds with no bruit auscultated  Soft abdomen  Palpitation noted generalized tenderness in all quadrants, most prominently in the RUQ; tenderness noted but not maximally over McBurney's point  No masses or pulsatile aorta noted on examination  No rebound or guarding noted on examination, non-peritoneal exam     Back:  No rash or signs of herpes zoster  No CVA tenderness noted  Skin:  No ecchymosis of the umbilicus (negative Dugway's sign) or flank (negative Grey Pickens's sign)  Warm and dry  Medical Decision Making    Abdominal pain with a broad differential   Will establish IV access and make patient NPO considering possibility of surgical intervention required    Will initiate symptomatic management including intravenous " fluids  As patient has history of risk factors for ACS, will obtain EKG and troponin to evaluate for potential referred pain  Patient has a history of GERD which increases the evaluation of the complexity of their presenting complaint  Review of the medical record including prior primary care note and prior echocardiography from this year  Differential is broad and includes significant likelihood of intra-abdominal pathology, including concerns for potential infectious or inflammatory processes  Patient does have point tenderness in the RUQ raising concerns for biliary process  Will obtain CBC to evaluate for anemia and leukocytosis  Will obtain CMP to evaluate electrolytes, renal, biliary, and hepatic function  Will obtain lipase to evaluate for potential pancreatitis  Will obtain lactate to evaluate for mesenteric ischemia and sepsis  Based on patient's age, history, physical exam and presenting complaint, will obtain contrast enhanced CT imaging of patient's abdomen and pelvis to further evaluate for possible intraabdominal pathology  Prior to Admission Medications   Prescriptions Last Dose Informant Patient Reported? Taking?    Lotemax SM 0 38 % GEL  Self Yes No   Sig: PLACE 1 DROP TWICE DAILY INTO BOTH EYES   NIFEdipine (PROCARDIA XL) 60 mg 24 hr tablet   No No   Sig: Take 1 tablet (60 mg total) by mouth daily   RESTASIS 0 05 % ophthalmic emulsion  Self Yes No   Sig: Administer 1 drop to both eyes every 12 (twelve) hours   acetaminophen (TYLENOL) 325 mg tablet  Self No No   Sig: Take 2 tablets (650 mg total) by mouth every 6 (six) hours   aspirin (ECOTRIN LOW STRENGTH) 81 mg EC tablet  Self No No   Sig: Take 1 tablet (81 mg total) by mouth 2 (two) times a day   celecoxib (CeleBREX) 100 mg capsule  Self No No   Sig: Take 1 capsule (100 mg total) by mouth 2 (two) times a day   docusate sodium (COLACE) 100 mg capsule  Self No No   Sig: Take 1 capsule (100 mg total) by mouth 2 (two) times a day   gabapentin (Neurontin) 100 mg capsule  Self No No   Sig: Take 1 capsule (100 mg total) by mouth 2 (two) times a day   hydrochlorothiazide (HYDRODIURIL) 25 mg tablet  Self No No   Sig: Take 1 tablet (25 mg total) by mouth daily   metoprolol tartrate (LOPRESSOR) 50 mg tablet  Self No No   Sig: TAKE 1 TABLET TWICE DAILY   oxyCODONE (Roxicodone) 5 immediate release tablet  Self No No   Sig: Take 1 tablet (5 mg total) by mouth every 4 (four) hours as needed for moderate pain Max Daily Amount: 30 mg   pantoprazole (PROTONIX) 40 mg tablet  Self No No   Sig: TAKE 1 TABLET EVERY MORNING   potassium chloride (K-DUR,KLOR-CON) 10 mEq tablet   No No   Sig: TAKE 1 TABLET TWICE DAILY   rivaroxaban (Xarelto) 10 mg tablet  Self No No   Sig: Take 1 tablet (10 mg total) by mouth daily   sertraline (ZOLOFT) 50 mg tablet   No No   Sig: Take 1 tablet (50 mg total) by mouth daily   simvastatin (ZOCOR) 10 mg tablet  Self No No   Sig: TAKE 1 TABLET AT BEDTIME   traMADol (Ultram) 50 mg tablet  Self No No   Sig: Take 1 tablet (50 mg total) by mouth every 8 (eight) hours as needed for moderate pain   valsartan (DIOVAN) 320 MG tablet  Self No No   Sig: Take 1 tablet (320 mg total) by mouth daily   zolpidem (AMBIEN) 5 mg tablet   No No   Sig: Take 1 tablet (5 mg total) by mouth daily at bedtime as needed for sleep      Facility-Administered Medications: None       Past Medical History:   Diagnosis Date   • Anemia    • Arthritis    • Bacterial pneumonia     last assessed: 2017   • Borderline diabetes    • Breast cancer ([facility] [address] )     right   • Depression    • Diastolic dysfunction    • GERD (gastroesophageal reflux disease)    • High cholesterol    • History of chemotherapy     right breast ca   • History of radiation therapy     right breast ca   • Hypertension    • Insomnia    • Palpitations        Past Surgical History:   Procedure Laterality Date   • BREAST LUMPECTOMY Right    •  SECTION     • COLONOSCOPY     • ESOPHAGOGASTRODUODENOSCOPY N/A 02/11/2019    Procedure: ESOPHAGOGASTRODUODENOSCOPY (EGD); Surgeon: Trista Petit MD;  Location: [facility]; Service: Gastroenterology   • JOINT REPLACEMENT Right     knee   • KNEE SURGERY     • IL ARTHRP KNE CONDYLE&PLATU MEDIAL&LAT COMPARTMENTS Right 01/18/2016    Procedure: ARTHROPLASTY KNEE TOTAL;  Surgeon: Dangelo Sutton MD;  Location:  MAIN OR;  Service: Orthopedics   • IL ARTHRP KNE CONDYLE&PLATU MEDIAL&LAT COMPARTMENTS Left 03/15/2023    Procedure: Left total knee arthroplasty;  Surgeon: Ellen Schneider DO;  Location: [facility];  Service: Orthopedics   • IL COLONOSCOPY FLX DX W/COLLJ SPEC WHEN PFRMD N/A 12/31/2018    Procedure: COLONOSCOPY;  Surgeon: Trista Petit MD;  [facility]: [facility]; Service: Gastroenterology   • IL ESOPHAGOGASTRODUODENOSCOPY TRANSORAL DIAGNOSTIC N/A 12/31/2018    Procedure: ESOPHAGOGASTRODUODENOSCOPY (EGD); Surgeon: Trista Petit MD;  [facility]; Service: Gastroenterology       Family History   Problem Relation Age of Onset   • Diabetes Mother    • Diabetes Father    • Diabetes Sister    • Cancer Sister    • Diabetes Brother    • No Known Problems Son    • Thyroid disease Daughter    • Diabetes Brother    • No Known Problems Brother    • No Known Problems Brother    • No Known Problems Brother    • No Known Problems Brother    • No Known Problems Brother    • Diabetes Sister    • Cancer Sister    • Diabetes Sister    • Cancer Sister    • No Known Problems Sister    • No Known Problems Sister    • No Known Problems Sister    • No Known Problems Son    • Breast cancer Neg Hx      I have reviewed and agree with the history as documented      E-Cigarette/Vaping   • E-Cigarette Use Never User      E-Cigarette/Vaping Substances   • Nicotine No    • THC No    • CBD No    • Flavoring No    • Other No    • Unknown No      Social History     Tobacco Use   • Smoking status: Never   • Smokeless tobacco: Never   Vaping Use   • Vaping Use: Never used   Substance Use Topics   • Alcohol use: Never   • Drug use: No       Review of Systems    Physical Exam  Physical Exam    Vital Signs  ED Triage Vitals   Temperature Pulse Respirations Blood Pressure SpO2   05/24/23 2229 05/24/23 2229 05/24/23 2229 05/24/23 2229 05/24/23 2229   97 7 °F (36 5 °C) 105 16 (!) 170/103 98 %      Temp Source Heart Rate Source Patient Position - Orthostatic VS BP Location FiO2 (%)   05/24/23 2229 05/24/23 2229 05/24/23 2229 05/24/23 2229 --   Oral Monitor Sitting Left arm       Pain Score       05/24/23 2330       8           Vitals:    05/24/23 2343 05/24/23 2359 05/25/23 0143 05/25/23 0213   BP: 137/88 144/92 166/84 157/81   Pulse: 96 83 76 79   Patient Position - Orthostatic VS:             Visual Acuity      ED Medications  Medications   ondansetron (ZOFRAN) injection 4 mg (4 mg Intravenous Given 5/24/23 2330)   fentanyl citrate (PF) 100 MCG/2ML 25 mcg (25 mcg Intravenous Given 5/24/23 2330)   sodium chloride 0 9 % bolus 1,000 mL (0 mL Intravenous Stopped 5/25/23 0214)   iohexol (OMNIPAQUE) 350 MG/ML injection (SINGLE-DOSE) 100 mL (100 mL Intravenous Given 5/25/23 0026)   metoclopramide (REGLAN) injection 10 mg (10 mg Intravenous Given 5/25/23 0200)   diphenhydrAMINE (BENADRYL) injection 25 mg (25 mg Intravenous Given 5/25/23 0200)       Diagnostic Studies  Results Reviewed     Procedure Component Value Units Date/Time    HS Troponin I 2hr [[id number]]  (Normal) Collected: 05/25/23 0208    Lab Status: Final result Specimen: Blood from Arm, Left Updated: 05/25/23 0233     hs TnI 2hr 7 ng/L      Delta 2hr hsTnI 0 ng/L     HS Troponin 0hr (reflex protocol) [[id number]]  (Normal) Collected: 05/24/23 2329    Lab Status: Final result Specimen: Blood from Arm, Left Updated: 05/25/23 0009     hs TnI 0hr 7 ng/L     Lactic acid, plasma (w/reflex if result > 2 0) [[id number]]  (Normal) Collected: 05/24/23 3108    Lab Status: Final result Specimen: Blood from Arm, Left Updated: 05/25/23 0002     LACTIC ACID 1 3 mmol/L     Narrative:      Result may be elevated if tourniquet was used during collection      Lipase [[id number]]  (Abnormal) Collected: 05/24/23 2329    Lab Status: Final result Specimen: Blood from Arm, Left Updated: 05/25/23 0001     Lipase 10 u/L     CMP [[id number]] Collected: 05/24/23 2329    Lab Status: Final result Specimen: Blood from Arm, Left Updated: 05/25/23 0001     Sodium 138 mmol/L      Potassium 3 6 mmol/L      Chloride 106 mmol/L      CO2 24 mmol/L      ANION GAP 8 mmol/L      BUN 17 mg/dL      Creatinine 0 78 mg/dL      Glucose 135 mg/dL      Calcium 9 6 mg/dL      AST 16 U/L      ALT 10 U/L      Alkaline Phosphatase 82 U/L      Total Protein 7 2 g/dL      Albumin 4 2 g/dL      Total Bilirubin 0 38 mg/dL      eGFR 78 ml/min/1 73sq m     Narrative:      [place] guidelines for Chronic Kidney Disease (CKD):   •  Stage 1 with normal or high GFR (GFR > 90 mL/min/1 73 square meters)  •  Stage 2 Mild CKD (GFR = 60-89 mL/min/1 73 square meters)  •  Stage 3A Moderate CKD (GFR = 45-59 mL/min/1 73 square meters)  •  Stage 3B Moderate CKD (GFR = 30-44 mL/min/1 73 square meters)  •  Stage 4 Severe CKD (GFR = 15-29 mL/min/1 73 square meters)  •  Stage 5 End Stage CKD (GFR <15 mL/min/1 73 square meters)  Note: GFR calculation is accurate only with a steady state creatinine    CBC and differential [[id number]]  (Abnormal) Collected: 05/24/23 2329    Lab Status: Final result Specimen: Blood from Arm, Left Updated: 05/24/23 2345     WBC 6 50 Thousand/uL      RBC 3 85 Million/uL      Hemoglobin 11 4 g/dL      Hematocrit 33 9 %      MCV 88 fL      MCH 29 6 pg      MCHC 33 6 g/dL      RDW 13 6 %      MPV 8 8 fL      Platelets 159 Thousands/uL      nRBC 0 /100 WBCs      Neutrophils Relative 64 %      Immat GRANS % 0 %      Lymphocytes Relative 25 %      Monocytes Relative 8 %      Eosinophils Relative 2 %      Basophils Relative 1 % Neutrophils Absolute 4 15 Thousands/µL      Immature Grans Absolute 0 01 Thousand/uL      Lymphocytes Absolute 1 63 Thousands/µL      Monocytes Absolute 0 53 Thousand/µL      Eosinophils Absolute 0 15 Thousand/µL      Basophils Absolute 0 03 Thousands/µL                  CT abdomen pelvis with contrast   Final Result by Sari Gottron, MD (05/25 9287)      No acute abnormality            Workstation performed: [id number]                    Procedures  Procedures         ED Course  ED Course as of 05/25/23 1609   Wed May 24, 2023   2349 EKG demonstrates normal sinus rhythm with borderline elevation in 2 and aVF without reciprocal changes  QTc is normal 454   Thu May 25, 2023   0234 Patient with initial improvement but subsequent recurrent vomiting, will treat with additional antiemetics and fluids  Will monitor and reassess  0405 Patient feels improved, tolerating oral intake, with no additional episodes of vomiting  Discussed findings with the patient  Discussed diagnostic concerning for potential etiologies  Discussed and emphasized the need for continued follow-up and return precautions  0408 SpO2(!): 80 %  Patient was never hypoxic, no dyspnea, no chest pain  Unfortunately nursing who is here at that time is no longer present though I reviewed the monitor and there were no hypoxic events  SBIRT 20yo+    Flowsheet Row Most Recent Value   Initial Alcohol Screen: US AUDIT-C     1  How often do you have a drink containing alcohol? 0 Filed at: 05/25/2023 0013   2  How many drinks containing alcohol do you have on a typical day you are drinking? 1 Filed at: 05/25/2023 0013   3b  FEMALE Any Age, or MALE 65+: How often do you have 4 or more drinks on one occassion? 0 Filed at: 05/25/2023 0013   Audit-C Score 1 Filed at: 05/25/2023 1321   DONNIE: How many times in the past year have you    Used an illegal drug or used a prescription medication for non-medical reasons?  Never Filed at: 05/25/2023 0013                    MDM    Disposition  Final diagnoses:   Abdominal pain, vomiting, and diarrhea     Time reflects when diagnosis was documented in both MDM as applicable and the Disposition within this note     Time User Action Codes Description Comment    5/25/2023  4:03 AM Jose Juan Cunningham Add [R10 9,  R11 10,  R19 7] Abdominal pain, vomiting, and diarrhea       ED Disposition     ED Disposition   Discharge    Condition   Stable    Date/Time   u May 25, 2023  4:08 AM    Comment   Radha Mix discharge to home/self care  Follow-up Information     Follow up With Specialties Details Why Contact Info Additional Information    Austen Giraldo MD Internal Medicine Schedule an appointment as soon as possible for a visit in 3 days Follow-up and reassessment   [facility]  [address] [facility] Emergency Department Emergency Medicine Go to  If symptoms worsen [address] Emergency Department, [address] [place] [address], [phone]          Discharge Medication List as of 5/25/2023  4:19 AM      CONTINUE these medications which have CHANGED    Details   dicyclomine (BENTYL) 20 mg tablet Take 1 tablet (20 mg total) by mouth 2 (two) times a day as needed (Abdominal pain), Starting u 5/25/2023, Normal      ondansetron (ZOFRAN-ODT) 4 mg disintegrating tablet Take 1 tablet (4 mg total) by mouth every 8 (eight) hours as needed for nausea or vomiting, Starting u 5/25/2023, Normal         CONTINUE these medications which have NOT CHANGED    Details   acetaminophen (TYLENOL) 325 mg tablet Take 2 tablets (650 mg total) by mouth every 6 (six) hours, Starting u 3/16/2023, Normal      aspirin (ECOTRIN LOW STRENGTH) 81 mg EC tablet Take 1 tablet (81 mg total) by mouth 2 (two) times a day, Starting Fri 3/17/2023, Normal      celecoxib (CeleBREX) 100 mg capsule Take 1 capsule (100 mg total) by mouth 2 (two) times a day, Starting Fri 3/17/2023, Normal      docusate sodium (COLACE) 100 mg capsule Take 1 capsule (100 mg total) by mouth 2 (two) times a day, Starting Thu 3/16/2023, Normal      gabapentin (Neurontin) 100 mg capsule Take 1 capsule (100 mg total) by mouth 2 (two) times a day, Starting Fri 3/17/2023, Normal      hydrochlorothiazide (HYDRODIURIL) 25 mg tablet Take 1 tablet (25 mg total) by mouth daily, Starting Fri 11/11/2022, Normal      Lotemax SM 0 38 % GEL PLACE 1 DROP TWICE DAILY INTO BOTH EYES, Historical Med      metoprolol tartrate (LOPRESSOR) 50 mg tablet TAKE 1 TABLET TWICE DAILY, Normal      NIFEdipine (PROCARDIA XL) 60 mg 24 hr tablet Take 1 tablet (60 mg total) by mouth daily, Starting Wed 5/17/2023, Normal      oxyCODONE (Roxicodone) 5 immediate release tablet Take 1 tablet (5 mg total) by mouth every 4 (four) hours as needed for moderate pain Max Daily Amount: 30 mg, Starting Fri 3/17/2023, Normal      pantoprazole (PROTONIX) 40 mg tablet TAKE 1 TABLET EVERY MORNING, Normal      potassium chloride (K-DUR,KLOR-CON) 10 mEq tablet TAKE 1 TABLET TWICE DAILY, Normal      RESTASIS 0 05 % ophthalmic emulsion Administer 1 drop to both eyes every 12 (twelve) hours, Starting Mon 11/19/2018, Historical Med      rivaroxaban (Xarelto) 10 mg tablet Take 1 tablet (10 mg total) by mouth daily, Starting Fri 3/17/2023, Normal      sertraline (ZOLOFT) 50 mg tablet Take 1 tablet (50 mg total) by mouth daily, Starting Fri 5/19/2023, Normal      simvastatin (ZOCOR) 10 mg tablet TAKE 1 TABLET AT BEDTIME, Normal      traMADol (Ultram) 50 mg tablet Take 1 tablet (50 mg total) by mouth every 8 (eight) hours as needed for moderate pain, Starting Mon 3/27/2023, Normal      valsartan (DIOVAN) 320 MG tablet Take 1 tablet (320 mg total) by mouth daily, Starting Fri 11/18/2022, Normal      zolpidem (AMBIEN) 5 mg tablet Take 1 tablet (5 mg total) by mouth daily at bedtime as needed for sleep, Starting Fri 5/19/2023, Normal             No discharge procedures on file      PDMP Review       Value Time User    PDMP Reviewed  Yes 5/19/2023 12:49 PM Emilie Auguste MD          ED Provider  Electronically Signed by           Heidy De La Cruz MD  05/25/23 2632

## 2023-05-25 NOTE — TELEPHONE ENCOUNTER
"Regarding: Stomach Issues  ----- Message from Sudhakar Briggs sent at 5/24/2023  9:03 PM EDT -----  \"I haven't eaten in days   Nothing stays down, everything comes right up\"    "

## 2023-05-31 ENCOUNTER — OFFICE VISIT (OUTPATIENT)
Dept: PHYSICAL THERAPY | Facility: CLINIC | Age: 68
End: 2023-05-31

## 2023-05-31 DIAGNOSIS — Z96.652 TOTAL KNEE REPLACEMENT STATUS, LEFT: ICD-10-CM

## 2023-05-31 DIAGNOSIS — M17.12 OSTEOARTHRITIS OF LEFT KNEE, UNSPECIFIED OSTEOARTHRITIS TYPE: Primary | ICD-10-CM

## 2023-05-31 NOTE — PROGRESS NOTES
"Daily Note     Today's date: 2023  Patient name: Danielle Hart  : 1955  MRN: [id number]  Referring provider: Verónica Alanis DO  Dx:   Encounter Diagnosis     ICD-10-CM    1  Osteoarthritis of left knee, unspecified osteoarthritis type  M17 12       2  Total knee replacement status, left  Z96 652                      Subjective: Pt states her L knee is feeling \"great\" and she has been bending her knee well  Objective: See treatment diary below      Assessment: AROM L knee 0-110*  Demonstrates good eccentric control during step downs  Able to perform charted exercises without c/o pain or discomfort  Patient provided updated HEP for home  Plan: Continue per plan of care        Precautions: Left TKA 3-15-23 secondary to severe DJD  Access Code: [address]    POC expires Auth Status Unit limit Start date  Expiration date PT/OT + Visit Limit?   23 Approved - 12 visits  BOMN 23 BOMN                                         Manuals    PROM L knee AF SC AF  AF  SK AF AF AF   Patellar ROM       SK AF AF AF                             Neuro Re-Ed             QS, GS  5\"x20  Prone 5\"x20   5\"x20 5\"x20 5\"x20 5\"x20   TKE BTB x20  BTB x20 BTB x20 BTB x30    GTB x20 GTB x20   Biodex LOS L12 x3  L12 x4 L11/10 x4 L7 x4 L7 x4   Static x3 L12 x3   SLS     15\"x4 15\"x4                                 Ther Ex             bike 5' ROM 6' ROM endurance 5' ROM 5' ROM 5' ROM 6' ROM 5' ROM 5' ROM 5' ROM 5' ROM   Supine abd             Supine heel slides x20 A/AA x20 AA x20 AA/A x20 AA/A manual  10s x10 x20 A/AA x20 A/AA x20 A/AA   saq 3# 3x10  4# 3x10  5# 3x10   2# 2x10 2# 2x10 3# 3x10   slr 2# 2x10 3x10  4# 2x10 5# 2x10 3x10 w QS 2x10 2x10 2x10 2# 2x10   Calf stretch             Hamstring stretch 30\"x3 supine c strap 30\"x3 30\"x3          LAQ 3# 3x10  4# 3x10 4# 3x10 5# 3x10 GTB 2x10 2x10 2# 2x10 2# 3x10 3# 3x10   Bridges             HR           " "  Hamstring curl       3x10 bilat 3x10 b/l     Standing hip 3 way RTB 2x10  RTB 2x10   GTB 2x10 abd/  Ext  3x10  bilat abd/ext 3x10 b/l RTB 2x10 RTB 2x10   Mini squat 2x10  TRX 2x10 TRX 2x10 TRX 2x10 2x10   2x10 2x10   Leg press 75# 3x10 75# x25 75# 3x10   85# 3x10       Runner's gastroc stretch      30\"x3       Ther Activity             Step ups 6\" 2x10  8\" 2x10   6\" 2x10  2x10 4\" 6\" 2X10 6\" 2x10   Sit to stands 2x12  2x12 YMB 2x10 BMB 2x10   2x10 2x10 2x10   Sled pull   75# 2 laps 7# 5 laps NV        Step down     6\" x20 6\" x20       Gait Training                                       Modalities             Cp                                         "

## 2023-06-08 ENCOUNTER — APPOINTMENT (OUTPATIENT)
Dept: RADIOLOGY | Facility: CLINIC | Age: 68
End: 2023-06-08
Payer: COMMERCIAL

## 2023-06-08 ENCOUNTER — OFFICE VISIT (OUTPATIENT)
Dept: OBGYN CLINIC | Facility: CLINIC | Age: 68
End: 2023-06-08

## 2023-06-08 VITALS
DIASTOLIC BLOOD PRESSURE: 53 MMHG | BODY MASS INDEX: 33.68 KG/M2 | WEIGHT: 183 LBS | HEIGHT: 62 IN | HEART RATE: 64 BPM | SYSTOLIC BLOOD PRESSURE: 152 MMHG

## 2023-06-08 DIAGNOSIS — Z96.652 STATUS POST TOTAL LEFT KNEE REPLACEMENT: Primary | ICD-10-CM

## 2023-06-08 DIAGNOSIS — Z96.652 STATUS POST TOTAL LEFT KNEE REPLACEMENT: ICD-10-CM

## 2023-06-08 PROCEDURE — 73560 X-RAY EXAM OF KNEE 1 OR 2: CPT

## 2023-06-08 PROCEDURE — 99024 POSTOP FOLLOW-UP VISIT: CPT | Performed by: ORTHOPAEDIC SURGERY

## 2023-06-08 RX ORDER — NIFEDIPINE 60 MG/1
TABLET, FILM COATED, EXTENDED RELEASE ORAL
COMMUNITY
Start: 2023-05-25

## 2023-06-08 NOTE — PROGRESS NOTES
"Patient Name:  Zenaida Rocha  MRN:  [id number]    [address] [place] [address]  Status post total left knee replacement  -     XR knee 1 or 2 vw left; Future; Expected date: 06/08/2023        Approximately 12 weeks s/p Left total knee arthroplasty performed on 03/15/2023  · X-rays reviewed with patient  · Overall, patient doing very well  · Educated patient about knee swelling post operatively, especially if prolonged standing or walking  Continue to monitor symptoms  · Continue to work on range of motion with home exercises and outpatient PT for strengthening  · Follow-up in 3 months for reevaluation of Left knee with new AP and lateral images upon arrival     History of the Present Illness   Zenaida Rocha is a 76 y o  female approximately 12 weeks s/p Left total knee arthroplasty performed on 03/15/2023  Today, patient reports she is doing very well s/p surgical intervention and she is very happy with the outcome of the procedure  Patient admits she is doing normal activities without pain  She does admit to some swelling on the medial and lateral aspects of the knee, especially after a long day of standing or walking  Review of Systems     Review of Systems    Physical Exam     /53   Pulse 64   Ht 5' 2\" (1 575 m)   Wt 83 kg (183 lb)   BMI 33 47 kg/m²     Left  Knee  Surgical incision well healed  Range of motion from 0 to 110 degrees without pain  There is no soft tissue swelling, nor effusion  There is no tenderness over the knee  The patient is able to perform a straight leg raise with 5/5 quad strength  Varus stress testing reveals no pain or instability at 0 and 30 degrees   Valgus stress testing reveals no pain or instability at 0 and 30 degrees  Calf soft, compressible and nontender  The patient is neurovascular intact distally  Data Review     I have personally reviewed pertinent films in PACS, and my interpretation follows      No new images    Social History " Tobacco Use   • Smoking status: Never   • Smokeless tobacco: Never   Vaping Use   • Vaping Use: Never used   Substance Use Topics   • Alcohol use: Never   • Drug use: No           Procedures  None     Syd Hurst PA-C

## 2023-06-13 ENCOUNTER — VBI (OUTPATIENT)
Dept: ADMINISTRATIVE | Facility: OTHER | Age: 68
End: 2023-06-13

## 2023-06-20 DIAGNOSIS — D64.9 ANEMIA, UNSPECIFIED TYPE: ICD-10-CM

## 2023-06-20 RX ORDER — PNV NO.95/FERROUS FUM/FOLIC AC 28MG-0.8MG
TABLET ORAL
Qty: 30 TABLET | Refills: 3 | Status: SHIPPED | OUTPATIENT
Start: 2023-06-20

## 2023-06-22 ENCOUNTER — OFFICE VISIT (OUTPATIENT)
Dept: INTERNAL MEDICINE CLINIC | Facility: CLINIC | Age: 68
End: 2023-06-22
Payer: COMMERCIAL

## 2023-06-22 VITALS
DIASTOLIC BLOOD PRESSURE: 80 MMHG | HEART RATE: 67 BPM | WEIGHT: 178 LBS | HEIGHT: 62 IN | BODY MASS INDEX: 32.76 KG/M2 | OXYGEN SATURATION: 97 % | SYSTOLIC BLOOD PRESSURE: 128 MMHG | RESPIRATION RATE: 14 BRPM

## 2023-06-22 DIAGNOSIS — T78.49XA: Primary | ICD-10-CM

## 2023-06-22 PROCEDURE — 99213 OFFICE O/P EST LOW 20 MIN: CPT | Performed by: INTERNAL MEDICINE

## 2023-06-22 RX ORDER — METHYLPREDNISOLONE 4 MG/1
TABLET ORAL
Qty: 21 EACH | Refills: 0 | Status: SHIPPED | OUTPATIENT
Start: 2023-06-22

## 2023-06-22 NOTE — PROGRESS NOTES
Assessment/Plan:     Appears to be an allergic reaction, most likely from the shampoo  She has already removed the offending agents  We will treat with steroids  Quality Measures:       Return if symptoms worsen or fail to improve  No problem-specific Assessment & Plan notes found for this encounter  Diagnoses and all orders for this visit:    Allergic reaction to detergent  -     methylPREDNISolone 4 MG tablet therapy pack; Use as directed on package          Subjective:      Patient ID: Pankaj Tompkins is a 76 y o  female  Patient comes in today complaining of 2 days of itching and redness about the both her eyes  She states it started after she was using a new shampoo  Got some on her face and then this started  She stopped the shampoo but her symptoms persist   No discharge from the eye  No blurry vision  Just very annoying and itchy  She has not tried taking anything for it  ALLERGIES:  Allergies   Allergen Reactions   • Ancef [Cefazolin] Hives     Possible hives during TKA surgery 3/15/2023  See postop note         CURRENT MEDICATIONS:    Current Outpatient Medications:   •  acetaminophen (TYLENOL) 325 mg tablet, Take 2 tablets (650 mg total) by mouth every 6 (six) hours, Disp: 60 tablet, Rfl: 0  •  Ferrous Sulfate (Iron) 325 (65 Fe) MG TABS, TAKE 1 TABLET BY MOUTH DAILY WITH BREAKFAST, Disp: 30 tablet, Rfl: 3  •  hydrochlorothiazide (HYDRODIURIL) 25 mg tablet, TAKE 1 TABLET EVERY DAY, Disp: 90 tablet, Rfl: 1  •  Lotemax SM 0 38 % GEL, PLACE 1 DROP TWICE DAILY INTO BOTH EYES, Disp: , Rfl:   •  methylPREDNISolone 4 MG tablet therapy pack, Use as directed on package, Disp: 21 each, Rfl: 0  •  metoprolol tartrate (LOPRESSOR) 50 mg tablet, TAKE 1 TABLET TWICE DAILY, Disp: 180 tablet, Rfl: 1  •  NIFEdipine (PROCARDIA XL) 60 mg 24 hr tablet, Take 1 tablet (60 mg total) by mouth daily, Disp: 90 tablet, Rfl: 1  •  NIFEdipine ER (ADALAT CC) 60 MG 24 hr tablet, , Disp: , Rfl:   • ondansetron (ZOFRAN-ODT) 4 mg disintegrating tablet, Take 1 tablet (4 mg total) by mouth every 8 (eight) hours as needed for nausea or vomiting, Disp: 10 tablet, Rfl: 0  •  pantoprazole (PROTONIX) 40 mg tablet, TAKE 1 TABLET EVERY MORNING, Disp: 90 tablet, Rfl: 1  •  potassium chloride (K-DUR,KLOR-CON) 10 mEq tablet, TAKE 1 TABLET TWICE DAILY, Disp: 180 tablet, Rfl: 1  •  RESTASIS 0 05 % ophthalmic emulsion, Administer 1 drop to both eyes every 12 (twelve) hours, Disp: , Rfl: 3  •  rivaroxaban (Xarelto) 10 mg tablet, Take 1 tablet (10 mg total) by mouth daily, Disp: 14 tablet, Rfl: 0  •  sertraline (ZOLOFT) 50 mg tablet, Take 1 tablet (50 mg total) by mouth daily, Disp: 90 tablet, Rfl: 1  •  simvastatin (ZOCOR) 10 mg tablet, TAKE 1 TABLET AT BEDTIME, Disp: 90 tablet, Rfl: 1  •  valsartan (DIOVAN) 320 MG tablet, TAKE 1 TABLET EVERY DAY, Disp: 90 tablet, Rfl: 1  •  zolpidem (AMBIEN) 5 mg tablet, Take 1 tablet (5 mg total) by mouth daily at bedtime as needed for sleep, Disp: 90 tablet, Rfl: 1  •  aspirin (ECOTRIN LOW STRENGTH) 81 mg EC tablet, Take 1 tablet (81 mg total) by mouth 2 (two) times a day (Patient not taking: Reported on 6/8/2023), Disp: 28 tablet, Rfl: 0  •  celecoxib (CeleBREX) 100 mg capsule, Take 1 capsule (100 mg total) by mouth 2 (two) times a day (Patient not taking: Reported on 6/8/2023), Disp: 30 capsule, Rfl: 0  •  dicyclomine (BENTYL) 20 mg tablet, Take 1 tablet (20 mg total) by mouth 2 (two) times a day as needed (Abdominal pain) (Patient not taking: Reported on 6/8/2023), Disp: 20 tablet, Rfl: 0  •  docusate sodium (COLACE) 100 mg capsule, Take 1 capsule (100 mg total) by mouth 2 (two) times a day (Patient not taking: Reported on 6/8/2023), Disp: 20 capsule, Rfl: 0  •  gabapentin (Neurontin) 100 mg capsule, Take 1 capsule (100 mg total) by mouth 2 (two) times a day (Patient not taking: Reported on 6/8/2023), Disp: 30 capsule, Rfl: 0  •  oxyCODONE (Roxicodone) 5 immediate release tablet, Take 1 tablet (5 mg total) by mouth every 4 (four) hours as needed for moderate pain Max Daily Amount: 30 mg (Patient not taking: Reported on 2023), Disp: 20 tablet, Rfl: 0  •  traMADol (Ultram) 50 mg tablet, Take 1 tablet (50 mg total) by mouth every 8 (eight) hours as needed for moderate pain (Patient not taking: Reported on 2023), Disp: 20 tablet, Rfl: 0    ACTIVE PROBLEM LIST:  Patient Active Problem List   Diagnosis   • Chronic insomnia   • Depression   • Diastolic dysfunction   • Mixed hyperlipidemia   • Hypertension, benign   • Impaired fasting glucose   • Primary osteoarthritis of left knee   • Callus of foot   • Bunion of great toe of left foot   • Screening for colon cancer   • Iron deficiency anemia   • Unintentional weight loss   • Murmur, heart   • Gastroesophageal reflux disease   • Personal history of malignant neoplasm of breast   • Mild episode of recurrent major depressive disorder ([facility] )       PAST MEDICAL HISTORY:  Past Medical History:   Diagnosis Date   • Anemia    • Arthritis    • Bacterial pneumonia     last assessed: 2017   • Borderline diabetes    • Breast cancer ([facility] ) 2003    right   • Depression    • Diastolic dysfunction    • GERD (gastroesophageal reflux disease)    • High cholesterol    • History of chemotherapy 2003    right breast ca   • History of radiation therapy 2003    right breast ca   • Hypertension    • Insomnia    • Palpitations        PAST SURGICAL HISTORY:  Past Surgical History:   Procedure Laterality Date   • BREAST LUMPECTOMY Right    •  SECTION     • COLONOSCOPY     • ESOPHAGOGASTRODUODENOSCOPY N/A 2019    Procedure: ESOPHAGOGASTRODUODENOSCOPY (EGD); Surgeon: Paige Polk MD;  Location: [facility];   Service: Gastroenterology   • JOINT REPLACEMENT Right     knee   • KNEE SURGERY     • OH ARTHRP KNE CONDYLE&PLATU MEDIAL&LAT COMPARTMENTS Right 2016    Procedure: ARTHROPLASTY KNEE TOTAL;  Surgeon: Soni Martin MD;  Location:  MAIN OR;  Service: Orthopedics   • PA ARTHRP KNE CONDYLE&PLATU MEDIAL&LAT COMPARTMENTS Left 03/15/2023    Procedure: Left total knee arthroplasty;  Surgeon: Randall Monique DO;  Location: [facility];  Service: Orthopedics   • PA COLONOSCOPY FLX DX W/COLLJ SPEC WHEN PFRMD N/A 12/31/2018    Procedure: COLONOSCOPY;  Surgeon: Moe Mullen MD;  Location: [facility]; Service: Gastroenterology   • PA ESOPHAGOGASTRODUODENOSCOPY TRANSORAL DIAGNOSTIC N/A 12/31/2018    Procedure: ESOPHAGOGASTRODUODENOSCOPY (EGD); Surgeon: Moe Mullen MD;  Location: [facility];   Service: Gastroenterology       FAMILY HISTORY:  Family History   Problem Relation Age of Onset   • Diabetes Mother    • Diabetes Father    • Diabetes Sister    • Cancer Sister    • Diabetes Brother    • No Known Problems Son    • Thyroid disease Daughter    • Diabetes Brother    • No Known Problems Brother    • No Known Problems Brother    • No Known Problems Brother    • No Known Problems Brother    • No Known Problems Brother    • Diabetes Sister    • Cancer Sister    • Diabetes Sister    • Cancer Sister    • No Known Problems Sister    • No Known Problems Sister    • No Known Problems Sister    • No Known Problems Son    • Breast cancer Neg Hx        SOCIAL HISTORY:  Social History     Socioeconomic History   • Marital status: Single     Spouse name: Not on file   • Number of children: Not on file   • Years of education: Not on file   • Highest education level: Not on file   Occupational History   • Occupation: Full-time    Tobacco Use   • Smoking status: Never   • Smokeless tobacco: Never   Vaping Use   • Vaping Use: Never used   Substance and Sexual Activity   • Alcohol use: Never   • Drug use: No   • Sexual activity: Not Currently   Other Topics Concern   • Not on file   Social History Narrative    Denied history of high risk sexual behavior     Social Determinants of Health     Financial Resource Strain: Not on file   Food Insecurity: No "Food Insecurity (3/16/2023)    Hunger Vital Sign    • Worried About Running Out of Food in the Last Year: Never true    • Ran Out of Food in the Last Year: Never true   Transportation Needs: No Transportation Needs (3/16/2023)    PRAPARE - Transportation    • Lack of Transportation (Medical): No    • Lack of Transportation (Non-Medical): No   Physical Activity: Not on file   Stress: Not on file   Social Connections: Not on file   Intimate Partner Violence: Not on file   Housing Stability: Low Risk  (3/16/2023)    Housing Stability Vital Sign    • Unable to Pay for Housing in the Last Year: No    • Number of Places Lived in the Last Year: 1    • Unstable Housing in the Last Year: No       Review of Systems   Constitutional: Negative for fever  Eyes: Positive for itching  Negative for pain, discharge and visual disturbance  Objective:  Vitals:    06/22/23 1254   BP: 128/80   BP Location: Left arm   Patient Position: Sitting   Pulse: 67   Resp: 14   SpO2: 97%   Weight: 80 7 kg (178 lb)   Height: 5' 2\" (1 575 m)     Body mass index is 32 56 kg/m²  Physical Exam  Vitals and nursing note reviewed  Constitutional:       Appearance: Normal appearance  Eyes:      General: Vision grossly intact  Right eye: No discharge  Left eye: No discharge  Conjunctiva/sclera: Conjunctivae normal       Comments: Swelling and edema of upper and lower eyelids in both eyes  Neurological:      Mental Status: She is alert  RESULTS:    In chart    This note was created with voice recognition software  Phonic, grammatical and spelling errors may be present within the note as a result      "

## 2023-06-27 ENCOUNTER — RA CDI HCC (OUTPATIENT)
Dept: OTHER | Facility: HOSPITAL | Age: 68
End: 2023-06-27

## 2023-06-27 NOTE — PROGRESS NOTES
Previous suggestion used  [facility]  coding opportunities       Chart reviewed, no opportunity found: CHART REVIEWED, NO OPPORTUNITY FOUND        Patients Insurance     Medicare Insurance: [employer]

## 2023-07-05 ENCOUNTER — OFFICE VISIT (OUTPATIENT)
Dept: INTERNAL MEDICINE CLINIC | Facility: CLINIC | Age: 68
End: 2023-07-05
Payer: COMMERCIAL

## 2023-07-05 VITALS
SYSTOLIC BLOOD PRESSURE: 120 MMHG | BODY MASS INDEX: 33.13 KG/M2 | HEIGHT: 62 IN | OXYGEN SATURATION: 98 % | HEART RATE: 63 BPM | RESPIRATION RATE: 14 BRPM | WEIGHT: 180 LBS | DIASTOLIC BLOOD PRESSURE: 86 MMHG

## 2023-07-05 DIAGNOSIS — F33.0 MILD EPISODE OF RECURRENT MAJOR DEPRESSIVE DISORDER (HCC): ICD-10-CM

## 2023-07-05 DIAGNOSIS — E78.2 MIXED HYPERLIPIDEMIA: ICD-10-CM

## 2023-07-05 DIAGNOSIS — I10 HYPERTENSION, BENIGN: ICD-10-CM

## 2023-07-05 DIAGNOSIS — Z00.00 MEDICARE ANNUAL WELLNESS VISIT, SUBSEQUENT: Primary | ICD-10-CM

## 2023-07-05 DIAGNOSIS — R73.01 IMPAIRED FASTING GLUCOSE: ICD-10-CM

## 2023-07-05 PROCEDURE — G0439 PPPS, SUBSEQ VISIT: HCPCS | Performed by: INTERNAL MEDICINE

## 2023-07-05 PROCEDURE — 3725F SCREEN DEPRESSION PERFORMED: CPT | Performed by: INTERNAL MEDICINE

## 2023-07-05 PROCEDURE — 1123F ACP DISCUSS/DSCN MKR DOCD: CPT | Performed by: INTERNAL MEDICINE

## 2023-07-05 PROCEDURE — 1170F FXNL STATUS ASSESSED: CPT | Performed by: INTERNAL MEDICINE

## 2023-07-05 PROCEDURE — 99214 OFFICE O/P EST MOD 30 MIN: CPT | Performed by: INTERNAL MEDICINE

## 2023-07-05 PROCEDURE — 1160F RVW MEDS BY RX/DR IN RCRD: CPT | Performed by: INTERNAL MEDICINE

## 2023-07-05 PROCEDURE — 1125F AMNT PAIN NOTED PAIN PRSNT: CPT | Performed by: INTERNAL MEDICINE

## 2023-07-05 PROCEDURE — 3288F FALL RISK ASSESSMENT DOCD: CPT | Performed by: INTERNAL MEDICINE

## 2023-07-05 PROCEDURE — 1159F MED LIST DOCD IN RCRD: CPT | Performed by: INTERNAL MEDICINE

## 2023-07-05 NOTE — PROGRESS NOTES
Assessment and Plan:     Problem List Items Addressed This Visit        Endocrine    Impaired fasting glucose    Relevant Orders    Hemoglobin A1C       Cardiovascular and Mediastinum    Hypertension, benign    Relevant Orders    Comprehensive metabolic panel    CBC and differential       Other    Mixed hyperlipidemia    Relevant Orders    Lipid panel    Mild episode of recurrent major depressive disorder (HCC)    Relevant Medications    sertraline (ZOLOFT) 50 mg tablet   Other Visit Diagnoses     Medicare annual wellness visit, subsequent    -  Primary         Chronic problems are stable but due for labs. Continue current medications. She will get the labs done within the next week. Continue follow-up with her specialists. Preventive health issues were discussed with patient, and age appropriate screening tests were ordered as noted in patient's After Visit Summary. Personalized health advice and appropriate referrals for health education or preventive services given if needed, as noted in patient's After Visit Summary. History of Present Illness:     Patient presents for a Medicare Wellness Visit    Patient comes in today for follow-up and Medicare wellness. She states she is doing okay. She has finished the physical therapy for her knee after the surgery. Doing well in that regards. Blood pressures controlled. Cholesterol and sugar have been controlled but she is due for labs. She did not realize that the lab had moved buildings. She is taking her medicines as directed. Her depression is under control. Living with her son and her granddaughter is now. That home situation appears stable. The allergic reaction from the detergent is totally resolved. No other complaints today. No further additions to her history.      Patient Care Team:  Simone Bird MD as PCP - Dann Serrato MD as PCP - [address] [place] [address] [facility] ([facility])  MIGUEL Gonzalez MD Faith Joen Grise, PA-C (Gastroenterology)  Edwige Banda MD as Endoscopist  Dawson Humphrey RN as Nurse Navigator (Orthopedics)     Review of Systems:     Review of Systems   Respiratory: Negative for shortness of breath. Cardiovascular: Negative for chest pain. Gastrointestinal: Negative for abdominal pain. Problem List:     Patient Active Problem List   Diagnosis   • Chronic insomnia   • Depression   • Diastolic dysfunction   • Mixed hyperlipidemia   • Hypertension, benign   • Impaired fasting glucose   • Primary osteoarthritis of left knee   • Callus of foot   • Bunion of great toe of left foot   • Screening for colon cancer   • Iron deficiency anemia   • Unintentional weight loss   • Murmur, heart   • Gastroesophageal reflux disease   • Personal history of malignant neoplasm of breast   • Mild episode of recurrent major depressive disorder [facility])      Past Medical and Surgical History:     Past Medical History:   Diagnosis Date   • Anemia    • Arthritis    • Bacterial pneumonia     last assessed: 2017   • Borderline diabetes    • Breast cancer ([address]) 2003    right   • Depression    • Diastolic dysfunction    • GERD (gastroesophageal reflux disease)    • High cholesterol    • History of chemotherapy 2003    right breast ca   • History of radiation therapy 2003    right breast ca   • Hypertension    • Insomnia    • Palpitations      Past Surgical History:   Procedure Laterality Date   • BREAST LUMPECTOMY Right    •  SECTION     • COLONOSCOPY     • ESOPHAGOGASTRODUODENOSCOPY N/A 2019    Procedure: ESOPHAGOGASTRODUODENOSCOPY (EGD); Surgeon: Edwige Banda MD;  Location: [facility];   Service: Gastroenterology   • JOINT REPLACEMENT Right     knee   • KNEE SURGERY     • [place] ARTHRP KNE CONDYLE&PLATU MEDIAL&LAT COMPARTMENTS Right 2016    Procedure: ARTHROPLASTY KNEE TOTAL;  Surgeon: Brando Gauthier MD;  Location:  MAIN OR;  Service: Orthopedics   • NJ ARTHRP KNE CONDYLE&PLATU MEDIAL&LAT COMPARTMENTS Left 03/15/2023    Procedure: Left total knee arthroplasty;  Surgeon: Martin Lopez DO;  [facility]: [facility];  Service: Orthopedics   • MI COLONOSCOPY FLX DX W/COLLJ SPEC WHEN PFRMD N/A 12/31/2018    Procedure: COLONOSCOPY;  Surgeon: Mike Alexander MD;  Location: [facility]; Service: Gastroenterology   • MI ESOPHAGOGASTRODUODENOSCOPY TRANSORAL DIAGNOSTIC N/A 12/31/2018    Procedure: ESOPHAGOGASTRODUODENOSCOPY (EGD); Surgeon: Mike Alexander MD;  [facility];   Service: Gastroenterology      Family History:     Family History   Problem Relation Age of Onset   • Diabetes Mother    • Diabetes Father    • Diabetes Sister    • Cancer Sister    • Diabetes Brother    • No Known Problems Son    • Thyroid disease Daughter    • Diabetes Brother    • No Known Problems Brother    • No Known Problems Brother    • No Known Problems Brother    • No Known Problems Brother    • No Known Problems Brother    • Diabetes Sister    • Cancer Sister    • Diabetes Sister    • Cancer Sister    • No Known Problems Sister    • No Known Problems Sister    • No Known Problems Sister    • No Known Problems Son    • Breast cancer Neg Hx       Social History:     Social History     Socioeconomic History   • Marital status: Single     Spouse name: None   • Number of children: None   • Years of education: None   • Highest education level: None   Occupational History   • Occupation: Full-time    Tobacco Use   • Smoking status: Never   • Smokeless tobacco: Never   Vaping Use   • Vaping Use: Never used   Substance and Sexual Activity   • Alcohol use: Never   • Drug use: No   • Sexual activity: Not Currently   Other Topics Concern   • None   Social History Narrative    Denied history of high risk sexual behavior     Social Determinants of Health     Financial Resource Strain: Low Risk  (7/5/2023)    Overall Financial Resource Strain (CARDIA)    • Difficulty of Paying Living Expenses: Not hard at all   Food Insecurity: No Food Insecurity (3/16/2023)    Hunger Vital Sign    • Worried About Running Out of Food in the Last Year: Never true    • Ran Out of Food in the Last Year: Never true   Transportation Needs: No Transportation Needs (7/5/2023)    PRAPARE - Transportation    • Lack of Transportation (Medical): No    • Lack of Transportation (Non-Medical):  No   Physical Activity: Not on file   Stress: Not on file   Social Connections: Not on file   Intimate Partner Violence: Not on file   Housing Stability: Low Risk  (3/16/2023)    Housing Stability Vital Sign    • Unable to Pay for Housing in the Last Year: No    • Number of Places Lived in the Last Year: 1    • Unstable Housing in the Last Year: No      Medications and Allergies:     Current Outpatient Medications   Medication Sig Dispense Refill   • acetaminophen (TYLENOL) 325 mg tablet Take 2 tablets (650 mg total) by mouth every 6 (six) hours 60 tablet 0   • Ferrous Sulfate (Iron) 325 (65 Fe) MG TABS TAKE 1 TABLET BY MOUTH DAILY WITH BREAKFAST 30 tablet 3   • hydrochlorothiazide (HYDRODIURIL) 25 mg tablet TAKE 1 TABLET EVERY DAY 90 tablet 1   • Lotemax SM 0.38 % GEL PLACE 1 DROP TWICE DAILY INTO BOTH EYES     • metoprolol tartrate (LOPRESSOR) 50 mg tablet TAKE 1 TABLET TWICE DAILY 180 tablet 1   • NIFEdipine (PROCARDIA XL) 60 mg 24 hr tablet Take 1 tablet (60 mg total) by mouth daily 90 tablet 1   • pantoprazole (PROTONIX) 40 mg tablet TAKE 1 TABLET EVERY MORNING 90 tablet 1   • potassium chloride (K-DUR,KLOR-CON) 10 mEq tablet TAKE 1 TABLET TWICE DAILY 180 tablet 1   • RESTASIS 0.05 % ophthalmic emulsion Administer 1 drop to both eyes every 12 (twelve) hours  3   • sertraline (ZOLOFT) 50 mg tablet Take 1 tablet (50 mg total) by mouth daily 90 tablet 1   • simvastatin (ZOCOR) 10 mg tablet TAKE 1 TABLET AT BEDTIME 90 tablet 1   • valsartan (DIOVAN) 320 MG tablet TAKE 1 TABLET EVERY DAY 90 tablet 1   • zolpidem (AMBIEN) 5 mg tablet Take 1 tablet (5 mg total) by mouth daily at bedtime as needed for sleep 90 tablet 1   • aspirin (ECOTRIN LOW STRENGTH) 81 mg EC tablet Take 1 tablet (81 mg total) by mouth 2 (two) times a day (Patient not taking: Reported on 6/8/2023) 28 tablet 0   • celecoxib (CeleBREX) 100 mg capsule Take 1 capsule (100 mg total) by mouth 2 (two) times a day (Patient not taking: Reported on 6/8/2023) 30 capsule 0   • docusate sodium (COLACE) 100 mg capsule Take 1 capsule (100 mg total) by mouth 2 (two) times a day (Patient not taking: Reported on 6/8/2023) 20 capsule 0   • gabapentin (Neurontin) 100 mg capsule Take 1 capsule (100 mg total) by mouth 2 (two) times a day (Patient not taking: Reported on 6/8/2023) 30 capsule 0   • NIFEdipine ER (ADALAT CC) 60 MG 24 hr tablet  (Patient not taking: Reported on 7/5/2023)     • oxyCODONE (Roxicodone) 5 immediate release tablet Take 1 tablet (5 mg total) by mouth every 4 (four) hours as needed for moderate pain Max Daily Amount: 30 mg (Patient not taking: Reported on 6/8/2023) 20 tablet 0   • rivaroxaban (Xarelto) 10 mg tablet Take 1 tablet (10 mg total) by mouth daily (Patient not taking: Reported on 7/5/2023) 14 tablet 0     No current facility-administered medications for this visit. Allergies   Allergen Reactions   • Ancef [Cefazolin] Hives     Possible hives during TKA surgery 3/15/2023. See postop note.       Immunizations:     Immunization History   Administered Date(s) Administered   • COVID-19 PFIZER VACCINE 0.3 ML IM 09/07/2021, 10/01/2021   • COVID-19 Pfizer vac (Ernesto-sucrose, gray cap) 12 yr+ IM 02/11/2022   • INFLUENZA 11/18/2015, 11/28/2016, 11/13/2017, 12/01/2022   • Influenza Quadrivalent Preservative Free 3 years and older IM 11/28/2016   • Influenza Quadrivalent, 6-35 Months IM 11/18/2015, 11/13/2017   • Influenza, high dose seasonal 0.7 mL 10/06/2020, 12/01/2022   • Influenza, recombinant, quadrivalent,injectable, preservative free 10/01/2018, 01/24/2020   • Pneumococcal Polysaccharide PPV23 05/16/2017   • Tdap 12/30/2015      Health Maintenance:         Topic Date Due   • Cervical Cancer Screening  08/19/2020   • Breast Cancer Screening: Mammogram  01/06/2024   • Colorectal Cancer Screening  10/14/2024   • Hepatitis C Screening  Completed         Topic Date Due   • Pneumococcal Vaccine: 65+ Years (2 - PCV) 05/16/2018   • COVID-19 Vaccine (4 - Pfizer series) 04/08/2022   • Influenza Vaccine (1) 09/01/2023      Medicare Screening Tests and Risk Assessments:     Eulalio Fernandez is here for her Subsequent Wellness visit. Health Risk Assessment:   Patient rates overall health as fair. Patient feels that their physical health rating is slightly better. Patient is satisfied with their life. Eyesight was rated as same. Hearing was rated as same. Patient feels that their emotional and mental health rating is same. Patients states they are sometimes angry. Patient states they are always unusually tired/fatigued. Pain experienced in the last 7 days has been some. Patient's pain rating has been 5/10. Patient states that she has experienced no weight loss or gain in last 6 months. Depression Screening:   PHQ-9 Score: 0      Fall Risk Screening: In the past year, patient has experienced: no history of falling in past year      Urinary Incontinence Screening:   Patient has not leaked urine accidently in the last six months. Home Safety:  Patient has trouble with stairs inside or outside of their home. Patient has working smoke alarms and has working carbon monoxide detector. Home safety hazards include: none. Nutrition:   Current diet is Regular. Medications:   Patient is currently taking over-the-counter supplements. OTC medications include: see medication list. Patient is able to manage medications.      Activities of Daily Living (ADLs)/Instrumental Activities of Daily Living (IADLs):   Walk and transfer into and out of bed and chair?: Yes  Dress and groom yourself?: Yes    Bathe or shower yourself?: Yes    Feed yourself? Yes  Do your laundry/housekeeping?: Yes  Manage your money, pay your bills and track your expenses?: Yes  Make your own meals?: Yes    Do your own shopping?: Yes    Previous Hospitalizations:   Any hospitalizations or ED visits within the last 12 months?: No      Advance Care Planning:   Living will: No    Advanced directive: No    Advanced directive counseling given: Yes      Cognitive Screening:   Provider or family/friend/caregiver concerned regarding cognition?: No    PREVENTIVE SCREENINGS      Cardiovascular Screening:    General: Screening Not Indicated and History Lipid Disorder      Diabetes Screening:     General: Screening Current      Colorectal Cancer Screening:     General: Screening Current      Breast Cancer Screening:     General: History Breast Cancer      Cervical Cancer Screening:    General: Screening Not Indicated      Osteoporosis Screening:    General: Screening Current      Abdominal Aortic Aneurysm (AAA) Screening:        General: Screening Not Indicated      Lung Cancer Screening:     General: Screening Not Indicated      Hepatitis C Screening:    General: Screening Current    Screening, Brief Intervention, and Referral to Treatment (SBIRT)    Screening  Typical number of drinks in a day: 0  Typical number of drinks in a week: 0  Interpretation: Low risk drinking behavior.     AUDIT-C Screenin) How often did you have a drink containing alcohol in the past year? never  2) How many drinks did you have on a typical day when you were drinking in the past year? 0  3) How often did you have 6 or more drinks on one occasion in the past year? never    AUDIT-C Score: 0  Interpretation: Score 0-2 (female): Negative screen for alcohol misuse    Single Item Drug Screening:  How often have you used an illegal drug (including marijuana) or a prescription medication for non-medical reasons in the past year? never    Single Item Drug Screen Score: 0  Interpretation: Negative screen for possible drug use disorder    Brief Intervention  Alcohol & drug use screenings were reviewed. No concerns regarding substance use disorder identified. No results found. Physical Exam:     /86 (BP Location: Left arm, Patient Position: Sitting)   Pulse 63   Resp 14   Ht 5' 2" (1.575 m)   Wt 81.6 kg (180 lb)   SpO2 98%   BMI 32.92 kg/m²     Physical Exam  Vitals and nursing note reviewed. Constitutional:       Appearance: She is well-developed. Cardiovascular:      Rate and Rhythm: Normal rate and regular rhythm. Pulmonary:      Effort: Pulmonary effort is normal.      Breath sounds: Normal breath sounds. Abdominal:      General: Abdomen is flat. Tenderness: There is no abdominal tenderness. Musculoskeletal:      Right lower leg: No edema. Left lower leg: No edema. Neurological:      Mental Status: She is alert and oriented to person, place, and time. Psychiatric:         Behavior: Behavior normal.         Thought Content:  Thought content normal.         Judgment: Judgment normal.          Stefano Norris MD

## 2023-07-07 ENCOUNTER — APPOINTMENT (OUTPATIENT)
Age: 68
End: 2023-07-07
Payer: COMMERCIAL

## 2023-07-07 DIAGNOSIS — I10 HYPERTENSION, BENIGN: ICD-10-CM

## 2023-07-07 DIAGNOSIS — E78.2 MIXED HYPERLIPIDEMIA: ICD-10-CM

## 2023-07-07 DIAGNOSIS — R73.01 IMPAIRED FASTING GLUCOSE: ICD-10-CM

## 2023-07-07 LAB
ALBUMIN SERPL BCP-MCNC: 3.5 G/DL (ref 3.5–5)
ALP SERPL-CCNC: 108 U/L (ref 46–116)
ALT SERPL W P-5'-P-CCNC: 18 U/L (ref 12–78)
ANION GAP SERPL CALCULATED.3IONS-SCNC: 5 MMOL/L
AST SERPL W P-5'-P-CCNC: 15 U/L (ref 5–45)
BASOPHILS # BLD AUTO: 0.04 THOUSANDS/ÂΜL (ref 0–0.1)
BASOPHILS NFR BLD AUTO: 1 % (ref 0–1)
BILIRUB SERPL-MCNC: 0.34 MG/DL (ref 0.2–1)
BUN SERPL-MCNC: 22 MG/DL (ref 5–25)
CALCIUM SERPL-MCNC: 9.5 MG/DL (ref 8.3–10.1)
CHLORIDE SERPL-SCNC: 110 MMOL/L (ref 96–108)
CHOLEST SERPL-MCNC: 209 MG/DL
CO2 SERPL-SCNC: 26 MMOL/L (ref 21–32)
CREAT SERPL-MCNC: 0.87 MG/DL (ref 0.6–1.3)
EOSINOPHIL # BLD AUTO: 0.29 THOUSAND/ÂΜL (ref 0–0.61)
EOSINOPHIL NFR BLD AUTO: 6 % (ref 0–6)
ERYTHROCYTE [DISTWIDTH] IN BLOOD BY AUTOMATED COUNT: 14 % (ref 11.6–15.1)
GFR SERPL CREATININE-BSD FRML MDRD: 68 ML/MIN/1.73SQ M
GLUCOSE P FAST SERPL-MCNC: 126 MG/DL (ref 65–99)
HCT VFR BLD AUTO: 37.8 % (ref 34.8–46.1)
HDLC SERPL-MCNC: 83 MG/DL
HGB BLD-MCNC: 12 G/DL (ref 11.5–15.4)
IMM GRANULOCYTES # BLD AUTO: 0.01 THOUSAND/UL (ref 0–0.2)
IMM GRANULOCYTES NFR BLD AUTO: 0 % (ref 0–2)
LDLC SERPL CALC-MCNC: 113 MG/DL (ref 0–100)
LYMPHOCYTES # BLD AUTO: 1.3 THOUSANDS/ÂΜL (ref 0.6–4.47)
LYMPHOCYTES NFR BLD AUTO: 26 % (ref 14–44)
MCH RBC QN AUTO: 28.5 PG (ref 26.8–34.3)
MCHC RBC AUTO-ENTMCNC: 31.7 G/DL (ref 31.4–37.4)
MCV RBC AUTO: 90 FL (ref 82–98)
MONOCYTES # BLD AUTO: 0.5 THOUSAND/ÂΜL (ref 0.17–1.22)
MONOCYTES NFR BLD AUTO: 10 % (ref 4–12)
NEUTROPHILS # BLD AUTO: 2.78 THOUSANDS/ÂΜL (ref 1.85–7.62)
NEUTS SEG NFR BLD AUTO: 57 % (ref 43–75)
NONHDLC SERPL-MCNC: 126 MG/DL
NRBC BLD AUTO-RTO: 0 /100 WBCS
PLATELET # BLD AUTO: 236 THOUSANDS/UL (ref 149–390)
PMV BLD AUTO: 9.6 FL (ref 8.9–12.7)
POTASSIUM SERPL-SCNC: 4.8 MMOL/L (ref 3.5–5.3)
PROT SERPL-MCNC: 7.1 G/DL (ref 6.4–8.4)
RBC # BLD AUTO: 4.21 MILLION/UL (ref 3.81–5.12)
SODIUM SERPL-SCNC: 141 MMOL/L (ref 135–147)
TRIGL SERPL-MCNC: 66 MG/DL
WBC # BLD AUTO: 4.92 THOUSAND/UL (ref 4.31–10.16)

## 2023-07-07 PROCEDURE — 80061 LIPID PANEL: CPT

## 2023-07-07 PROCEDURE — 85025 COMPLETE CBC W/AUTO DIFF WBC: CPT

## 2023-07-07 PROCEDURE — 83036 HEMOGLOBIN GLYCOSYLATED A1C: CPT

## 2023-07-07 PROCEDURE — 36415 COLL VENOUS BLD VENIPUNCTURE: CPT

## 2023-07-07 PROCEDURE — 80053 COMPREHEN METABOLIC PANEL: CPT

## 2023-07-11 LAB
EST. AVERAGE GLUCOSE BLD GHB EST-MCNC: 123 MG/DL
HBA1C MFR BLD: 5.9 %

## 2023-08-17 DIAGNOSIS — I10 HYPERTENSION, BENIGN: Primary | ICD-10-CM

## 2023-08-17 RX ORDER — NIFEDIPINE 60 MG/1
60 TABLET, FILM COATED, EXTENDED RELEASE ORAL DAILY
Qty: 90 TABLET | Refills: 1 | Status: SHIPPED | OUTPATIENT
Start: 2023-08-17

## 2023-09-11 ENCOUNTER — OFFICE VISIT (OUTPATIENT)
Dept: OBGYN CLINIC | Facility: CLINIC | Age: 68
End: 2023-09-11
Payer: COMMERCIAL

## 2023-09-11 ENCOUNTER — APPOINTMENT (OUTPATIENT)
Dept: RADIOLOGY | Facility: CLINIC | Age: 68
End: 2023-09-11
Payer: COMMERCIAL

## 2023-09-11 VITALS
DIASTOLIC BLOOD PRESSURE: 73 MMHG | BODY MASS INDEX: 33.55 KG/M2 | SYSTOLIC BLOOD PRESSURE: 114 MMHG | WEIGHT: 182.3 LBS | HEART RATE: 74 BPM | HEIGHT: 62 IN

## 2023-09-11 DIAGNOSIS — M25.462 EFFUSION OF LEFT KNEE: ICD-10-CM

## 2023-09-11 DIAGNOSIS — Z96.652 STATUS POST TOTAL LEFT KNEE REPLACEMENT: Primary | ICD-10-CM

## 2023-09-11 DIAGNOSIS — Z96.652 STATUS POST TOTAL LEFT KNEE REPLACEMENT: ICD-10-CM

## 2023-09-11 DIAGNOSIS — I10 HYPERTENSION, BENIGN: ICD-10-CM

## 2023-09-11 DIAGNOSIS — M25.562 LEFT KNEE PAIN, UNSPECIFIED CHRONICITY: ICD-10-CM

## 2023-09-11 PROCEDURE — 20610 DRAIN/INJ JOINT/BURSA W/O US: CPT | Performed by: ORTHOPAEDIC SURGERY

## 2023-09-11 PROCEDURE — 73560 X-RAY EXAM OF KNEE 1 OR 2: CPT

## 2023-09-11 PROCEDURE — 99214 OFFICE O/P EST MOD 30 MIN: CPT | Performed by: ORTHOPAEDIC SURGERY

## 2023-09-11 RX ORDER — METOPROLOL TARTRATE 50 MG/1
50 TABLET, FILM COATED ORAL 2 TIMES DAILY
Qty: 180 TABLET | Refills: 1 | Status: SHIPPED | OUTPATIENT
Start: 2023-09-11

## 2023-09-11 NOTE — TELEPHONE ENCOUNTER
Please refill         Good morning. This is [address]. I need someone to call in to [facility]. Metoprolol. It needs a refill. Thank you.

## 2023-09-11 NOTE — PROGRESS NOTES
Patient Name:  Nakul Navarrete  MRN:  [id number]    [id number] [facility]     1. Status post total left knee replacement  -     XR knee 1 or 2 vw left; Future; Expected date: 09/11/2023  -     Large joint arthrocentesis    2. Effusion of left knee    3. Left knee pain, unspecified chronicity        76 y.o. female approximately 6 month s/p Left  knee total knee arthroplasty performed on 3/15/23 with effusion and recently increased pain after fall. · X-rays reviewed in office today with patient  · Patient was offered an aspiration today for her left knee. Risks and benefits were discussed. Patient tolerated the procedure well. · 15 cc of blood-tinged fluid was aspirated from patient's left knee and was sent for Synovasure analysis to rule out infectious process, though low clinical suspicion. · Continue home exercise plan  · Patient may continue working as tolerated  · Follow up 6 months for repeat AP and lateral left knee x-rays     History of the Present Illness   Nakul Navarrete is a 76 y.o. female approximately 5 month s/p 27 days s.p Left  knee total knee arthroplasty performed on 3/15/23. Patient has been doing well overall. She admits to a trip and fall while working three weeks ago. She landed on her knees and has been feeling pain since then, although the pain is improvement. She has been having swelling in her knee. She states that the swelling began before the fall and is unchanged. She denies having fever, chills, redness, warmth. Review of Systems     Review of Systems   Constitutional: Negative for chills and fever. HENT: Negative for ear pain and sore throat. Eyes: Negative for pain and visual disturbance. Respiratory: Negative for cough and shortness of breath. Cardiovascular: Negative for chest pain and palpitations. Gastrointestinal: Negative for abdominal pain and vomiting. Genitourinary: Negative for dysuria and hematuria.    Musculoskeletal: Negative for arthralgias and back pain. Skin: Negative for color change and rash. Neurological: Negative for seizures and syncope. All other systems reviewed and are negative. Physical Exam     /73   Pulse 74   Ht 5' 2" (1.575 m)   Wt 82.7 kg (182 lb 4.8 oz)   BMI 33.34 kg/m²     Left  Knee  Surgical incision well healing, without signs of infection  Range of motion from 0 to 115. There is small effusion. No erythema or warmth  There is no tenderness. The patient is able to perform a straight leg raise with 6/5 quad strength. Varus stress testing reveals no instabilty at 0 and 30 degrees   Valgus stress testing reveals no instability at 0 and 30 degrees  Calf soft, compressible and nontender  The patient is neurovascular intact distally. Data Review     I have personally reviewed pertinent films in PACS, and my interpretation follows. X-rays taken 9/11/23 of the left knee demonstrate orthopedic hardware in stable alignment without evidence of loosening or failure. No acute fracture or dislocation. Social History     Tobacco Use   • Smoking status: Never   • Smokeless tobacco: Never   Vaping Use   • Vaping Use: Never used   Substance Use Topics   • Alcohol use: Never   • Drug use: No           Large joint arthrocentesis: L knee  Universal Protocol:  Consent: Verbal consent obtained. Risks and benefits: risks, benefits and alternatives were discussed  Consent given by: patient  Time out: Immediately prior to procedure a "time out" was called to verify the correct patient, procedure, equipment, support staff and site/side marked as required.   Patient understanding: patient states understanding of the procedure being performed  Site marked: the operative site was marked  Patient identity confirmed: verbally with patient    Supporting Documentation  Indications: pain, diagnostic evaluation and joint swelling   Procedure Details  Location: knee - L knee  Preparation: Patient was prepped and draped in the usual sterile fashion  Needle size: 18 G  Ultrasound guidance: no  Approach: lateral    Aspirate amount: 15 mL  Aspirate: blood-tinged, clear and serous  Analysis: fluid sample sent for laboratory analysis    Patient tolerance: patient tolerated the procedure well with no immediate complications  Dressing:  Sterile dressing applied          Allie Schwartz DO   Scribe Attestation    I,:  Alexandre Domínguez am acting as a scribe while in the presence of the attending physician.:       I,:  Allie Schwartz DO personally performed the services described in this documentation    as scribed in my presence.:

## 2023-09-12 ENCOUNTER — TELEPHONE (OUTPATIENT)
Dept: INTERNAL MEDICINE CLINIC | Facility: CLINIC | Age: 68
End: 2023-09-12

## 2023-09-12 DIAGNOSIS — M19.91 PRIMARY OSTEOARTHRITIS, UNSPECIFIED SITE: Primary | ICD-10-CM

## 2023-09-12 RX ORDER — MELOXICAM 7.5 MG/1
7.5 TABLET ORAL DAILY PRN
Qty: 30 TABLET | Refills: 3 | Status: SHIPPED | OUTPATIENT
Start: 2023-09-12

## 2023-09-12 NOTE — TELEPHONE ENCOUNTER
Asking for meloxicam medication says shes always on it?      I dont see on med list     7.5 mg  1 x day        [facility] if so

## 2023-09-14 ENCOUNTER — TELEPHONE (OUTPATIENT)
Age: 68
End: 2023-09-14

## 2023-09-14 NOTE — TELEPHONE ENCOUNTER
Caller: Self    Doctor: Sabrina Vargas    Reason for call: Patient following up on aspiration results. Did provider have a chance to review?     Call back#: [phone]

## 2023-09-18 DIAGNOSIS — E78.2 MIXED HYPERLIPIDEMIA: ICD-10-CM

## 2023-09-18 DIAGNOSIS — K21.9 GERD WITHOUT ESOPHAGITIS: ICD-10-CM

## 2023-09-18 RX ORDER — SIMVASTATIN 10 MG
TABLET ORAL
Qty: 90 TABLET | Refills: 1 | Status: SHIPPED | OUTPATIENT
Start: 2023-09-18

## 2023-09-18 RX ORDER — PANTOPRAZOLE SODIUM 40 MG/1
TABLET, DELAYED RELEASE ORAL
Qty: 90 TABLET | Refills: 1 | Status: SHIPPED | OUTPATIENT
Start: 2023-09-18

## 2023-10-18 ENCOUNTER — CLINICAL SUPPORT (OUTPATIENT)
Dept: INTERNAL MEDICINE CLINIC | Facility: CLINIC | Age: 68
End: 2023-10-18
Payer: COMMERCIAL

## 2023-10-18 DIAGNOSIS — Z23 ENCOUNTER FOR IMMUNIZATION: Primary | ICD-10-CM

## 2023-10-18 PROCEDURE — G0008 ADMIN INFLUENZA VIRUS VAC: HCPCS

## 2023-10-18 PROCEDURE — 90662 IIV NO PRSV INCREASED AG IM: CPT

## 2023-11-06 ENCOUNTER — OFFICE VISIT (OUTPATIENT)
Dept: INTERNAL MEDICINE CLINIC | Facility: CLINIC | Age: 68
End: 2023-11-06
Payer: COMMERCIAL

## 2023-11-06 VITALS
SYSTOLIC BLOOD PRESSURE: 124 MMHG | DIASTOLIC BLOOD PRESSURE: 84 MMHG | WEIGHT: 182 LBS | OXYGEN SATURATION: 95 % | HEIGHT: 62 IN | BODY MASS INDEX: 33.49 KG/M2 | HEART RATE: 68 BPM

## 2023-11-06 DIAGNOSIS — F51.04 CHRONIC INSOMNIA: ICD-10-CM

## 2023-11-06 DIAGNOSIS — E78.2 MIXED HYPERLIPIDEMIA: ICD-10-CM

## 2023-11-06 DIAGNOSIS — I10 HYPERTENSION, BENIGN: Primary | ICD-10-CM

## 2023-11-06 DIAGNOSIS — R73.01 IMPAIRED FASTING GLUCOSE: ICD-10-CM

## 2023-11-06 PROCEDURE — 99214 OFFICE O/P EST MOD 30 MIN: CPT | Performed by: INTERNAL MEDICINE

## 2023-11-06 RX ORDER — ZOLPIDEM TARTRATE 5 MG/1
5 TABLET ORAL
Qty: 90 TABLET | Refills: 1 | Status: SHIPPED | OUTPATIENT
Start: 2023-11-06

## 2023-11-06 NOTE — PROGRESS NOTES
Assessment/Plan:     Her chronic problems are stable. Follow-up with orthopedics today as planned for her foot fracture. For her chronic hypertension, continue metoprolol 50 mg twice a day along with her nifedipine and other meds. For chronic insomnia, continue amlodipine 5 mg as needed. Keep working on the diet for the impaired fasting glucose. Continue all other medications. Continue followup with all specialists. Continue diet and exercise. Ordered labs for next visit. Controlled Substance Review    [place] PDMP or [place]  reviewed: No red flags were identified; safe to proceed with prescription. Quality Measures:       Return in about 4 months (around 3/6/2024) for Recheck. No problem-specific Assessment & Plan notes found for this encounter. Diagnoses and all orders for this visit:    Hypertension, benign  -     Comprehensive metabolic panel; Future  -     CBC and differential; Future    Chronic insomnia    Mixed hyperlipidemia  -     Lipid panel; Future    Impaired fasting glucose  -     Hemoglobin A1C; Future          Subjective:      Patient ID: Juan Pablo Gan is a 76 y.o. female. Patient comes in today for routine follow-up. She had gone to the ER and was found to have broken her foot. She does have follow-up with orthopedics this afternoon for this. She states her chronic problems are stable. She is taking her blood pressure medicine as directed. That is controlled. Cholesterol has been controlled. Sugar remains borderline. Her home situation has improved. She is in senior housing now. She is quite happy with that. Still uses the Ambien for sleep. Denies any new complaints today. Other than the foot. No further additions to her history. ALLERGIES:  Allergies   Allergen Reactions   • Ancef [Cefazolin] Hives     Possible hives during TKA surgery 3/15/2023. See postop note.        CURRENT MEDICATIONS:    Current Outpatient Medications:   •  acetaminophen (TYLENOL) 325 mg tablet, Take 2 tablets (650 mg total) by mouth every 6 (six) hours, Disp: 60 tablet, Rfl: 0  •  Ferrous Sulfate (Iron) 325 (65 Fe) MG TABS, TAKE 1 TABLET BY MOUTH DAILY WITH BREAKFAST, Disp: 30 tablet, Rfl: 3  •  hydrochlorothiazide (HYDRODIURIL) 25 mg tablet, TAKE 1 TABLET EVERY DAY, Disp: 90 tablet, Rfl: 1  •  meloxicam (MOBIC) 7.5 mg tablet, Take 1 tablet (7.5 mg total) by mouth daily as needed for moderate pain, Disp: 30 tablet, Rfl: 3  •  metoprolol tartrate (LOPRESSOR) 50 mg tablet, Take 1 tablet (50 mg total) by mouth 2 (two) times a day, Disp: 180 tablet, Rfl: 1  •  NIFEdipine (PROCARDIA XL) 60 mg 24 hr tablet, Take 1 tablet (60 mg total) by mouth daily, Disp: 90 tablet, Rfl: 1  •  NIFEdipine ER (ADALAT CC) 60 MG 24 hr tablet, TAKE ONE (1) TABLET BY MOUTH DAILY, Disp: 90 tablet, Rfl: 1  •  pantoprazole (PROTONIX) 40 mg tablet, TAKE ONE (1) TABLET BY MOUTH DAILY IN THE MORNING, Disp: 90 tablet, Rfl: 1  •  potassium chloride (K-DUR,KLOR-CON) 10 mEq tablet, TAKE 1 TABLET TWICE DAILY, Disp: 180 tablet, Rfl: 1  •  RESTASIS 0.05 % ophthalmic emulsion, Administer 1 drop to both eyes every 12 (twelve) hours, Disp: , Rfl: 3  •  sertraline (ZOLOFT) 50 mg tablet, Take 1 tablet (50 mg total) by mouth daily, Disp: 90 tablet, Rfl: 1  •  simvastatin (ZOCOR) 10 mg tablet, TAKE ONE (1) TABLET BY MOUTH DAILY AT BEDTIME, Disp: 90 tablet, Rfl: 1  •  valsartan (DIOVAN) 320 MG tablet, TAKE 1 TABLET EVERY DAY, Disp: 90 tablet, Rfl: 1  •  zolpidem (AMBIEN) 5 mg tablet, Take 1 tablet (5 mg total) by mouth daily at bedtime as needed for sleep, Disp: 90 tablet, Rfl: 1    ACTIVE PROBLEM LIST:  Patient Active Problem List   Diagnosis   • Chronic insomnia   • Depression   • Diastolic dysfunction   • Mixed hyperlipidemia   • Hypertension, benign   • Impaired fasting glucose   • Primary osteoarthritis of left knee   • Callus of foot   • Bunion of great toe of left foot   • Screening for colon cancer   • Iron deficiency anemia   • Unintentional weight loss   • Murmur, heart   • Gastroesophageal reflux disease   • Personal history of malignant neoplasm of breast   • Mild episode of recurrent major depressive disorder ([address])       PAST MEDICAL HISTORY:  Past Medical History:   Diagnosis Date   • Anemia    • Arthritis    • Bacterial pneumonia     last assessed: 2017   • Borderline diabetes    • Breast cancer ([address]) 2003    right   • Depression    • Diastolic dysfunction    • GERD (gastroesophageal reflux disease)    • High cholesterol    • History of chemotherapy 2003    right breast ca   • History of radiation therapy 2003    right breast ca   • Hypertension    • Insomnia    • Palpitations        PAST SURGICAL HISTORY:  Past Surgical History:   Procedure Laterality Date   • BREAST LUMPECTOMY Right    •  SECTION     • COLONOSCOPY     • ESOPHAGOGASTRODUODENOSCOPY N/A 2019    Procedure: ESOPHAGOGASTRODUODENOSCOPY (EGD); Surgeon: Oxana Moreno MD;  Location: [facility]; Service: Gastroenterology   • JOINT REPLACEMENT Right     knee   • KNEE SURGERY     • OK ARTHRP KNE CONDYLE&PLATU MEDIAL&LAT COMPARTMENTS Right 2016    Procedure: ARTHROPLASTY KNEE TOTAL;  Surgeon: Adelso Sanchez MD;  Location:  MAIN OR;  Service: Orthopedics   • OK ARTHRP KNE CONDYLE&PLATU MEDIAL&LAT COMPARTMENTS Left 03/15/2023    Procedure: Left total knee arthroplasty;  Surgeon: Denisa Zee DO;  Location: [facility];  Service: Orthopedics   • OK COLONOSCOPY FLX DX W/COLLJ SPEC WHEN PFRMD N/A 2018    Procedure: COLONOSCOPY;  Surgeon: Oxana Moreno MD;  Location: [facility]; Service: Gastroenterology   • OK ESOPHAGOGASTRODUODENOSCOPY TRANSORAL DIAGNOSTIC N/A 2018    Procedure: ESOPHAGOGASTRODUODENOSCOPY (EGD); Surgeon: Oxana Moreno MD;  Location: [facility];   Service: Gastroenterology       FAMILY HISTORY:  Family History   Problem Relation Age of Onset   • Diabetes Mother    • Diabetes Father • Diabetes Sister    • Cancer Sister    • Diabetes Brother    • No Known Problems Son    • Thyroid disease Daughter    • Diabetes Brother    • No Known Problems Brother    • No Known Problems Brother    • No Known Problems Brother    • No Known Problems Brother    • No Known Problems Brother    • Diabetes Sister    • Cancer Sister    • Diabetes Sister    • Cancer Sister    • No Known Problems Sister    • No Known Problems Sister    • No Known Problems Sister    • No Known Problems Son    • Breast cancer Neg Hx        SOCIAL HISTORY:  Social History     Socioeconomic History   • Marital status: Single     Spouse name: Not on file   • Number of children: Not on file   • Years of education: Not on file   • Highest education level: Not on file   Occupational History   • Occupation: Full-time    Tobacco Use   • Smoking status: Never   • Smokeless tobacco: Never   Vaping Use   • Vaping Use: Never used   Substance and Sexual Activity   • Alcohol use: Never   • Drug use: No   • Sexual activity: Not Currently   Other Topics Concern   • Not on file   Social History Narrative    Denied history of high risk sexual behavior     Social Determinants of Health     Financial Resource Strain: Low Risk  (7/5/2023)    Overall Financial Resource Strain (CARDIA)    • Difficulty of Paying Living Expenses: Not hard at all   Food Insecurity: No Food Insecurity (3/16/2023)    Hunger Vital Sign    • Worried About Running Out of Food in the Last Year: Never true    • Ran Out of Food in the Last Year: Never true   Transportation Needs: No Transportation Needs (7/5/2023)    PRAPARE - Transportation    • Lack of Transportation (Medical): No    • Lack of Transportation (Non-Medical):  No   Physical Activity: Not on file   Stress: Not on file   Social Connections: Not on file   Intimate Partner Violence: Not on file   Housing Stability: Low Risk  (3/16/2023)    Housing Stability Vital Sign    • Unable to Pay for Housing in the Last Year: No    • Number of Places Lived in the Last Year: 1    • Unstable Housing in the Last Year: No       Review of Systems   Respiratory:  Negative for shortness of breath. Cardiovascular:  Negative for chest pain. Gastrointestinal:  Negative for abdominal pain. Objective:  Vitals:    11/06/23 1126   BP: 124/84   BP Location: Left arm   Patient Position: Sitting   Cuff Size: Adult   Pulse: 68   SpO2: 95%   Weight: 82.6 kg (182 lb)   Height: 5' 2" (1.575 m)     Body mass index is 33.29 kg/m². Physical Exam  Vitals and nursing note reviewed. Constitutional:       Appearance: Normal appearance. She is well-developed. Cardiovascular:      Rate and Rhythm: Normal rate and regular rhythm. Heart sounds: Normal heart sounds. Pulmonary:      Effort: Pulmonary effort is normal.      Breath sounds: Normal breath sounds. Abdominal:      Palpations: Abdomen is soft. Tenderness: There is no abdominal tenderness. Neurological:      Mental Status: She is alert and oriented to person, place, and time. Psychiatric:         Mood and Affect: Mood normal.         Behavior: Behavior normal.           RESULTS:  Hemoglobin A1C   Date/Time Value Ref Range Status   07/07/2023 08:37 AM 5.9 (H) Normal 3.8-5.6%; PreDiabetic 5.7-6.4%;  Diabetic >=6.5%; Glycemic control for adults with diabetes <7.0% % Final     Cholesterol   Date/Time Value Ref Range Status   07/07/2023 08:37  (H) See Comment mg/dL Final     Comment:     Cholesterol:         Pediatric <18 Years        Desirable          <170 mg/dL      Borderline High    170-199 mg/dL      High               >=200 mg/dL        Adult >=18 Years            Desirable        <200 mg/dL      Borderline High  200-239 mg/dL      High             >239 mg/dL       Triglycerides   Date/Time Value Ref Range Status   07/07/2023 08:37 AM 66 See Comment mg/dL Final     Comment:     Triglyceride:     0-9Y            <75mg/dL     10Y-17Y         <90 mg/dL       >=18Y     Normal <150 mg/dL     Borderline High 150-199 mg/dL     High            200-499 mg/dL        Very High       >499 mg/dL    Specimen collection should occur prior to N-Acetylcysteine or Metamizole administration due to the potential for falsely depressed results. HDL, Direct   Date/Time Value Ref Range Status   07/07/2023 08:37 AM 83 >=50 mg/dL Final     Comment:     Specimen collection should occur prior to Metamizole administration due to the potential for falsley depressed results. LDL Calculated   Date/Time Value Ref Range Status   07/07/2023 08:37  (H) 0 - 100 mg/dL Final     Comment:     LDL Cholesterol:     Optimal           <100 mg/dl     Near Optimal      100-129 mg/dl     Above Optimal       Borderline High 130-159 mg/dl       High            160-189 mg/dl       Very High       >189 mg/dl         This screening LDL is a calculated result. It does not have the accuracy of the Direct Measured LDL in the monitoring of patients with hyperlipidemia and/or statin therapy. Direct Measure LDL ([id number]) must be ordered separately in these patients. Hemoglobin   Date/Time Value Ref Range Status   07/07/2023 08:37 AM 12.0 11.5 - 15.4 g/dL Final     Hematocrit   Date/Time Value Ref Range Status   07/07/2023 08:37 AM 37.8 34.8 - 46.1 % Final     Platelets   Date/Time Value Ref Range Status   07/07/2023 08:37  149 - 390 Thousands/uL Final     Sodium   Date/Time Value Ref Range Status   07/07/2023 08:37  135 - 147 mmol/L Final     BUN   Date/Time Value Ref Range Status   07/07/2023 08:37 AM 22 5 - 25 mg/dL Final     Creatinine   Date/Time Value Ref Range Status   07/07/2023 08:37 AM 0.87 0.60 - 1.30 mg/dL Final     Comment:     Standardized to IDMS reference method      In chart    This note was created with voice recognition software. Phonic, grammatical and spelling errors may be present within the note as a result.

## 2023-11-20 DIAGNOSIS — M19.91 PRIMARY OSTEOARTHRITIS, UNSPECIFIED SITE: ICD-10-CM

## 2023-11-20 RX ORDER — MELOXICAM 7.5 MG/1
7.5 TABLET ORAL DAILY PRN
Qty: 30 TABLET | Refills: 3 | Status: SHIPPED | OUTPATIENT
Start: 2023-11-20

## 2023-11-30 DIAGNOSIS — I10 HYPERTENSION, BENIGN: ICD-10-CM

## 2023-12-01 RX ORDER — METOPROLOL TARTRATE 50 MG/1
50 TABLET, FILM COATED ORAL 2 TIMES DAILY
Qty: 180 TABLET | Refills: 1 | Status: SHIPPED | OUTPATIENT
Start: 2023-12-01

## 2023-12-04 ENCOUNTER — TELEPHONE (OUTPATIENT)
Dept: INTERNAL MEDICINE CLINIC | Facility: CLINIC | Age: 68
End: 2023-12-04

## 2023-12-04 NOTE — TELEPHONE ENCOUNTER
Patient dropped off paperwork for a handicap placard to be filled out. Please call patient when complete at [phone].     Placed in MA's bin

## 2024-01-04 ENCOUNTER — TELEPHONE (OUTPATIENT)
Dept: INTERNAL MEDICINE CLINIC | Facility: CLINIC | Age: 69
End: 2024-01-04

## 2024-01-04 DIAGNOSIS — E87.6 HYPOKALEMIA: ICD-10-CM

## 2024-01-04 RX ORDER — POTASSIUM CHLORIDE 750 MG/1
10 TABLET, EXTENDED RELEASE ORAL 2 TIMES DAILY
Qty: 180 TABLET | Refills: 1 | Status: SHIPPED | OUTPATIENT
Start: 2024-01-04

## 2024-01-04 NOTE — TELEPHONE ENCOUNTER
Was informed by staff that patient is no better with oral antibiotics.  Referred her back to the ER.  May require admission with IV antibiotics

## 2024-01-04 NOTE — TELEPHONE ENCOUNTER
Heads up pt was in e/r at [facility] and they want her to f/u for divericulitis.  ( Schedule full 2 weeks straight at the moment)    Notes are in sys if doc wants to see them for now

## 2024-01-06 DIAGNOSIS — F33.0 MILD EPISODE OF RECURRENT MAJOR DEPRESSIVE DISORDER (HCC): ICD-10-CM

## 2024-01-11 DIAGNOSIS — D64.9 ANEMIA, UNSPECIFIED TYPE: ICD-10-CM

## 2024-01-11 RX ORDER — PNV NO.95/FERROUS FUM/FOLIC AC 28MG-0.8MG
1 TABLET ORAL
Qty: 30 TABLET | Refills: 3 | Status: SHIPPED | OUTPATIENT
Start: 2024-01-11

## 2024-01-18 DIAGNOSIS — I10 HYPERTENSION, BENIGN: ICD-10-CM

## 2024-01-19 ENCOUNTER — TELEPHONE (OUTPATIENT)
Dept: INTERNAL MEDICINE CLINIC | Facility: CLINIC | Age: 69
End: 2024-01-19

## 2024-01-19 DIAGNOSIS — Z78.0 POSTMENOPAUSAL: ICD-10-CM

## 2024-01-19 DIAGNOSIS — Z87.81 HISTORY OF FRACTURE: Primary | ICD-10-CM

## 2024-01-19 NOTE — TELEPHONE ENCOUNTER
Shira says she had a frx in November and they are requesting a dexa scan by may for her?         Ext  [phone]

## 2024-01-22 ENCOUNTER — VBI (OUTPATIENT)
Dept: ADMINISTRATIVE | Facility: OTHER | Age: 69
End: 2024-01-22

## 2024-02-19 DIAGNOSIS — K21.9 GERD WITHOUT ESOPHAGITIS: ICD-10-CM

## 2024-02-19 RX ORDER — PANTOPRAZOLE SODIUM 40 MG/1
TABLET, DELAYED RELEASE ORAL
Qty: 90 TABLET | Refills: 1 | Status: SHIPPED | OUTPATIENT
Start: 2024-02-19

## 2024-02-20 DIAGNOSIS — E78.2 MIXED HYPERLIPIDEMIA: ICD-10-CM

## 2024-02-20 RX ORDER — SIMVASTATIN 10 MG
TABLET ORAL
Qty: 90 TABLET | Refills: 1 | Status: SHIPPED | OUTPATIENT
Start: 2024-02-20

## 2024-02-21 ENCOUNTER — APPOINTMENT (OUTPATIENT)
Age: 69
End: 2024-02-21
Payer: COMMERCIAL

## 2024-02-21 DIAGNOSIS — I10 HYPERTENSION, BENIGN: ICD-10-CM

## 2024-02-21 DIAGNOSIS — E78.2 MIXED HYPERLIPIDEMIA: ICD-10-CM

## 2024-02-21 DIAGNOSIS — R73.01 IMPAIRED FASTING GLUCOSE: ICD-10-CM

## 2024-02-21 PROBLEM — Z12.11 SCREENING FOR COLON CANCER: Status: RESOLVED | Noted: 2018-03-15 | Resolved: 2024-02-21

## 2024-02-21 LAB
ALBUMIN SERPL BCP-MCNC: 4.2 G/DL (ref 3.5–5)
ALP SERPL-CCNC: 77 U/L (ref 34–104)
ALT SERPL W P-5'-P-CCNC: 12 U/L (ref 7–52)
ANION GAP SERPL CALCULATED.3IONS-SCNC: 11 MMOL/L
AST SERPL W P-5'-P-CCNC: 17 U/L (ref 13–39)
BASOPHILS # BLD AUTO: 0.04 THOUSANDS/ÂΜL (ref 0–0.1)
BASOPHILS NFR BLD AUTO: 1 % (ref 0–1)
BILIRUB SERPL-MCNC: 0.32 MG/DL (ref 0.2–1)
BUN SERPL-MCNC: 31 MG/DL (ref 5–25)
CALCIUM SERPL-MCNC: 10 MG/DL (ref 8.4–10.2)
CHLORIDE SERPL-SCNC: 106 MMOL/L (ref 96–108)
CHOLEST SERPL-MCNC: 205 MG/DL
CO2 SERPL-SCNC: 25 MMOL/L (ref 21–32)
CREAT SERPL-MCNC: 0.7 MG/DL (ref 0.6–1.3)
EOSINOPHIL # BLD AUTO: 0.2 THOUSAND/ÂΜL (ref 0–0.61)
EOSINOPHIL NFR BLD AUTO: 4 % (ref 0–6)
ERYTHROCYTE [DISTWIDTH] IN BLOOD BY AUTOMATED COUNT: 14.2 % (ref 11.6–15.1)
EST. AVERAGE GLUCOSE BLD GHB EST-MCNC: 117 MG/DL
GFR SERPL CREATININE-BSD FRML MDRD: 89 ML/MIN/1.73SQ M
GLUCOSE P FAST SERPL-MCNC: 141 MG/DL (ref 65–99)
HBA1C MFR BLD: 5.7 %
HCT VFR BLD AUTO: 37.6 % (ref 34.8–46.1)
HDLC SERPL-MCNC: 75 MG/DL
HGB BLD-MCNC: 12.2 G/DL (ref 11.5–15.4)
IMM GRANULOCYTES # BLD AUTO: 0.02 THOUSAND/UL (ref 0–0.2)
IMM GRANULOCYTES NFR BLD AUTO: 0 % (ref 0–2)
LDLC SERPL CALC-MCNC: 112 MG/DL (ref 0–100)
LYMPHOCYTES # BLD AUTO: 1.51 THOUSANDS/ÂΜL (ref 0.6–4.47)
LYMPHOCYTES NFR BLD AUTO: 32 % (ref 14–44)
MCH RBC QN AUTO: 28.9 PG (ref 26.8–34.3)
MCHC RBC AUTO-ENTMCNC: 32.4 G/DL (ref 31.4–37.4)
MCV RBC AUTO: 89 FL (ref 82–98)
MONOCYTES # BLD AUTO: 0.48 THOUSAND/ÂΜL (ref 0.17–1.22)
MONOCYTES NFR BLD AUTO: 10 % (ref 4–12)
NEUTROPHILS # BLD AUTO: 2.49 THOUSANDS/ÂΜL (ref 1.85–7.62)
NEUTS SEG NFR BLD AUTO: 53 % (ref 43–75)
NONHDLC SERPL-MCNC: 130 MG/DL
NRBC BLD AUTO-RTO: 0 /100 WBCS
PLATELET # BLD AUTO: 251 THOUSANDS/UL (ref 149–390)
PMV BLD AUTO: 9.4 FL (ref 8.9–12.7)
POTASSIUM SERPL-SCNC: 4 MMOL/L (ref 3.5–5.3)
PROT SERPL-MCNC: 6.8 G/DL (ref 6.4–8.4)
RBC # BLD AUTO: 4.22 MILLION/UL (ref 3.81–5.12)
SODIUM SERPL-SCNC: 142 MMOL/L (ref 135–147)
TRIGL SERPL-MCNC: 90 MG/DL
WBC # BLD AUTO: 4.74 THOUSAND/UL (ref 4.31–10.16)

## 2024-02-21 PROCEDURE — 36415 COLL VENOUS BLD VENIPUNCTURE: CPT

## 2024-02-21 PROCEDURE — 80053 COMPREHEN METABOLIC PANEL: CPT

## 2024-02-21 PROCEDURE — 83036 HEMOGLOBIN GLYCOSYLATED A1C: CPT

## 2024-02-21 PROCEDURE — 85025 COMPLETE CBC W/AUTO DIFF WBC: CPT

## 2024-02-21 PROCEDURE — 80061 LIPID PANEL: CPT

## 2024-03-13 ENCOUNTER — TELEPHONE (OUTPATIENT)
Dept: INTERNAL MEDICINE CLINIC | Facility: CLINIC | Age: 69
End: 2024-03-13

## 2024-03-13 ENCOUNTER — OFFICE VISIT (OUTPATIENT)
Dept: INTERNAL MEDICINE CLINIC | Facility: CLINIC | Age: 69
End: 2024-03-13
Payer: COMMERCIAL

## 2024-03-13 VITALS
RESPIRATION RATE: 18 BRPM | OXYGEN SATURATION: 99 % | WEIGHT: 186 LBS | TEMPERATURE: 98 F | HEART RATE: 74 BPM | SYSTOLIC BLOOD PRESSURE: 110 MMHG | HEIGHT: 62 IN | DIASTOLIC BLOOD PRESSURE: 76 MMHG | BODY MASS INDEX: 34.23 KG/M2

## 2024-03-13 DIAGNOSIS — R07.0 THROAT PAIN: Primary | ICD-10-CM

## 2024-03-13 DIAGNOSIS — F33.0 MILD EPISODE OF RECURRENT MAJOR DEPRESSIVE DISORDER (HCC): ICD-10-CM

## 2024-03-13 DIAGNOSIS — I10 HYPERTENSION, BENIGN: Primary | ICD-10-CM

## 2024-03-13 DIAGNOSIS — E78.2 MIXED HYPERLIPIDEMIA: ICD-10-CM

## 2024-03-13 DIAGNOSIS — M25.472 ANKLE EDEMA, BILATERAL: ICD-10-CM

## 2024-03-13 DIAGNOSIS — M25.471 ANKLE EDEMA, BILATERAL: ICD-10-CM

## 2024-03-13 DIAGNOSIS — R73.01 IMPAIRED FASTING GLUCOSE: ICD-10-CM

## 2024-03-13 PROCEDURE — 99214 OFFICE O/P EST MOD 30 MIN: CPT | Performed by: INTERNAL MEDICINE

## 2024-03-13 PROCEDURE — G2211 COMPLEX E/M VISIT ADD ON: HCPCS | Performed by: INTERNAL MEDICINE

## 2024-03-13 RX ORDER — ERGOCALCIFEROL 1.25 MG/1
50000 CAPSULE ORAL WEEKLY
COMMUNITY

## 2024-03-13 RX ORDER — NIFEDIPINE 30 MG
30 TABLET, EXTENDED RELEASE ORAL DAILY
Qty: 30 TABLET | Refills: 5 | Status: SHIPPED | OUTPATIENT
Start: 2024-03-13 | End: 2024-09-09

## 2024-03-13 RX ORDER — SIMVASTATIN 20 MG
20 TABLET ORAL
Qty: 30 TABLET | Refills: 5 | Status: SHIPPED | OUTPATIENT
Start: 2024-03-13

## 2024-03-13 NOTE — PROGRESS NOTES
Assessment/Plan:     Her blood pressure is controlled but the nifedipine might be causing her ankle edema so we will try and lower that dose.  She will check her pressure at home and call if not controlled with that adjustment.  We are both assuming that some of her blood pressure before was her home situation.  That is much more stable now.  For her hyperlipidemia, we will adjust her simvastatin and then repeat labs in 6 weeks on the change.  Depression remains well-controlled with her sertraline 50 mg daily.  Keep watching the diet for the sugar.  Continue all other medications.    Continue followup with all specialists.   Continue diet and exercise.    Ordered labs for next visit.    Quality Measures:       Return in about 4 months (around 7/13/2024) for Regular visit and Medicare Wellness.    No problem-specific Assessment & Plan notes found for this encounter.       Diagnoses and all orders for this visit:    Hypertension, benign  -     NIFEdipine ER (ADALAT CC) 30 MG 24 hr tablet; Take 1 tablet (30 mg total) by mouth daily    Ankle edema, bilateral    Mild episode of recurrent major depressive disorder (HCC)    Mixed hyperlipidemia  -     simvastatin (ZOCOR) 20 mg tablet; Take 1 tablet (20 mg total) by mouth daily at bedtime  -     Comprehensive metabolic panel; Future  -     Lipid panel; Future    Impaired fasting glucose    Other orders  -     ergocalciferol (ERGOCALCIFEROL) 1.25 MG (12953 UT) capsule; Take 50,000 Units by mouth once a week          Subjective:      Patient ID: Chelsy Alford is a 68 y.o. female.    Comes in today for routine follow-up.  She states she is doing okay.  She now is in senior housing with her own apartment and doing very well.  Happy with the result.  Her blood pressure is controlled.  But she does note that her ankles are swelling more.  She is on a calcium channel blocker.  That dose was increased.  Blood pressure is doing very well now.  Much less stress now.  Her  cholesterol is still a little high.  She is taking the simvastatin.  The sertraline continues to work for her depression.  Trying to watch her diet.  No other complaints today.  No further additions to her history.        ALLERGIES:  Allergies   Allergen Reactions   • Ancef [Cefazolin] Hives     Possible hives during TKA surgery 3/15/2023.  See postop note.       CURRENT MEDICATIONS:    Current Outpatient Medications:   •  acetaminophen (TYLENOL) 325 mg tablet, Take 2 tablets (650 mg total) by mouth every 6 (six) hours, Disp: 60 tablet, Rfl: 0  •  ergocalciferol (ERGOCALCIFEROL) 1.25 MG (94652 UT) capsule, Take 50,000 Units by mouth once a week, Disp: , Rfl:   •  Ferrous Sulfate (Iron) 325 (65 Fe) MG TABS, Take 1 tablet (325 mg total) by mouth daily with breakfast, Disp: 30 tablet, Rfl: 3  •  hydrochlorothiazide (HYDRODIURIL) 25 mg tablet, TAKE 1 TABLET EVERY DAY, Disp: 90 tablet, Rfl: 1  •  meloxicam (MOBIC) 7.5 mg tablet, Take 1 tablet (7.5 mg total) by mouth daily as needed for moderate pain, Disp: 30 tablet, Rfl: 3  •  metoprolol tartrate (LOPRESSOR) 50 mg tablet, Take 1 tablet (50 mg total) by mouth 2 (two) times a day, Disp: 180 tablet, Rfl: 1  •  NIFEdipine ER (ADALAT CC) 30 MG 24 hr tablet, Take 1 tablet (30 mg total) by mouth daily, Disp: 30 tablet, Rfl: 5  •  pantoprazole (PROTONIX) 40 mg tablet, TAKE ONE (1) TABLET BY MOUTH DAILY IN THE MORNING, Disp: 90 tablet, Rfl: 1  •  potassium chloride (K-DUR,KLOR-CON) 10 mEq tablet, TAKE ONE (1) TABLET BY MOUTH TWICE DAILY, Disp: 180 tablet, Rfl: 1  •  RESTASIS 0.05 % ophthalmic emulsion, Administer 1 drop to both eyes every 12 (twelve) hours, Disp: , Rfl: 3  •  sertraline (ZOLOFT) 50 mg tablet, TAKE 1 TABLET (50 MG TOTAL) BY MOUTH DAILY, Disp: 90 tablet, Rfl: 1  •  simvastatin (ZOCOR) 20 mg tablet, Take 1 tablet (20 mg total) by mouth daily at bedtime, Disp: 30 tablet, Rfl: 5  •  valsartan (DIOVAN) 320 MG tablet, TAKE 1 TABLET EVERY DAY, Disp: 90 tablet, Rfl: 1  •   zolpidem (AMBIEN) 5 mg tablet, Take 1 tablet (5 mg total) by mouth daily at bedtime as needed for sleep, Disp: 90 tablet, Rfl: 1    ACTIVE PROBLEM LIST:  Patient Active Problem List   Diagnosis   • Chronic insomnia   • Diastolic dysfunction   • Mixed hyperlipidemia   • Hypertension, benign   • Impaired fasting glucose   • Primary osteoarthritis of left knee   • Callus of foot   • Bunion of great toe of left foot   • Iron deficiency anemia   • Unintentional weight loss   • Murmur, heart   • Gastroesophageal reflux disease   • Personal history of malignant neoplasm of breast   • Mild episode of recurrent major depressive disorder (HCC)       PAST MEDICAL HISTORY:  Past Medical History:   Diagnosis Date   • Anemia    • Arthritis    • Bacterial pneumonia     last assessed: 2017   • Borderline diabetes    • Breast cancer (HCC) 2003    right   • Depression    • Diastolic dysfunction    • GERD (gastroesophageal reflux disease)    • High cholesterol    • History of chemotherapy 2003    right breast ca   • History of radiation therapy 2003    right breast ca   • Hypertension    • Insomnia    • Palpitations        PAST SURGICAL HISTORY:  Past Surgical History:   Procedure Laterality Date   • BREAST LUMPECTOMY Right    •  SECTION     • COLONOSCOPY     • ESOPHAGOGASTRODUODENOSCOPY N/A 2019    Procedure: ESOPHAGOGASTRODUODENOSCOPY (EGD);  Surgeon: Poli Riojas III, MD;  Location: [facility];  Service: Gastroenterology   • JOINT REPLACEMENT Right     knee   • KNEE SURGERY     • AR ARTHRP KNE CONDYLE&PLATU MEDIAL&LAT COMPARTMENTS Right 2016    Procedure: ARTHROPLASTY KNEE TOTAL;  Surgeon: Angela Noel MD;  Location:  MAIN OR;  Service: Orthopedics   • AR ARTHRP KNE CONDYLE&PLATU MEDIAL&LAT COMPARTMENTS Left 03/15/2023    Procedure: Left total knee arthroplasty;  Surgeon: Tony Cano DO;  Location: [facility];  Service: Orthopedics   • AR COLONOSCOPY FLX DX W/COLLJ SPEC WHEN PFRMD N/A  12/31/2018    Procedure: COLONOSCOPY;  Surgeon: Poli Riojas III, MD;  Location: [facility];  Service: Gastroenterology   • IL ESOPHAGOGASTRODUODENOSCOPY TRANSORAL DIAGNOSTIC N/A 12/31/2018    Procedure: ESOPHAGOGASTRODUODENOSCOPY (EGD);  Surgeon: Poli Riojas III, MD;  Location: [facility];  Service: Gastroenterology       FAMILY HISTORY:  Family History   Problem Relation Age of Onset   • Diabetes Mother    • Diabetes Father    • Diabetes Sister    • Cancer Sister    • Diabetes Brother    • No Known Problems Son    • Thyroid disease Daughter    • Diabetes Brother    • No Known Problems Brother    • No Known Problems Brother    • No Known Problems Brother    • No Known Problems Brother    • No Known Problems Brother    • Diabetes Sister    • Cancer Sister    • Diabetes Sister    • Cancer Sister    • No Known Problems Sister    • No Known Problems Sister    • No Known Problems Sister    • No Known Problems Son    • Breast cancer Neg Hx        SOCIAL HISTORY:  Social History     Socioeconomic History   • Marital status: Single     Spouse name: Not on file   • Number of children: Not on file   • Years of education: Not on file   • Highest education level: Not on file   Occupational History   • Occupation: Full-time    Tobacco Use   • Smoking status: Never   • Smokeless tobacco: Never   Vaping Use   • Vaping status: Never Used   Substance and Sexual Activity   • Alcohol use: Never   • Drug use: No   • Sexual activity: Not Currently   Other Topics Concern   • Not on file   Social History Narrative    Denied history of high risk sexual behavior     Social Determinants of Health     Financial Resource Strain: Low Risk  (7/5/2023)    Overall Financial Resource Strain (CARDIA)    • Difficulty of Paying Living Expenses: Not hard at all   Food Insecurity: No Food Insecurity (3/16/2023)    Hunger Vital Sign    • Worried About Running Out of Food in the Last Year: Never true    • Ran Out of Food in the Last Year: Never  "true   Transportation Needs: No Transportation Needs (7/5/2023)    PRAPARE - Transportation    • Lack of Transportation (Medical): No    • Lack of Transportation (Non-Medical): No   Physical Activity: Not on file   Stress: Not on file   Social Connections: Not on file   Intimate Partner Violence: Not on file   Housing Stability: Low Risk  (3/16/2023)    Housing Stability Vital Sign    • Unable to Pay for Housing in the Last Year: No    • Number of Places Lived in the Last Year: 1    • Unstable Housing in the Last Year: No       Review of Systems   Respiratory:  Negative for shortness of breath.    Cardiovascular:  Negative for chest pain.   Gastrointestinal:  Negative for abdominal pain.         Objective:  Vitals:    03/13/24 1115   BP: 110/76   BP Location: Left arm   Patient Position: Sitting   Cuff Size: Standard   Pulse: 74   Resp: 18   Temp: 98 °F (36.7 °C)   TempSrc: Tympanic   SpO2: 99%   Weight: 84.4 kg (186 lb)   Height: 5' 2\" (1.575 m)     Body mass index is 34.02 kg/m².     Physical Exam  Vitals and nursing note reviewed.   Constitutional:       Appearance: Normal appearance. She is well-developed.   Cardiovascular:      Rate and Rhythm: Normal rate and regular rhythm.      Heart sounds: Normal heart sounds.   Pulmonary:      Effort: Pulmonary effort is normal.      Breath sounds: Normal breath sounds.   Abdominal:      Palpations: Abdomen is soft.      Tenderness: There is no abdominal tenderness.   Musculoskeletal:      Right lower leg: Edema present.      Left lower leg: Edema present.   Neurological:      Mental Status: She is alert and oriented to person, place, and time.   Psychiatric:         Mood and Affect: Mood normal.         Behavior: Behavior normal.           RESULTS:  Hemoglobin A1C   Date/Time Value Ref Range Status   02/21/2024 04:50 PM 5.7 (H) Normal 4.0-5.6%; PreDiabetic 5.7-6.4%; Diabetic >=6.5%; Glycemic control for adults with diabetes <7.0% % Final     Cholesterol   Date/Time Value " Ref Range Status   02/21/2024 04:50  (H) See Comment mg/dL Final     Comment:     Cholesterol:         Pediatric <18 Years        Desirable          <170 mg/dL      Borderline High    170-199 mg/dL      High               >=200 mg/dL        Adult >=18 Years            Desirable        <200 mg/dL      Borderline High  200-239 mg/dL      High             >239 mg/dL       Triglycerides   Date/Time Value Ref Range Status   02/21/2024 04:50 PM 90 See Comment mg/dL Final     Comment:     Triglyceride:     0-9Y            <75mg/dL     10Y-17Y         <90 mg/dL       >=18Y     Normal          <150 mg/dL     Borderline High 150-199 mg/dL     High            200-499 mg/dL        Very High       >499 mg/dL    Specimen collection should occur prior to Metamizole administration due to the potential for falsely depressed results.     HDL, Direct   Date/Time Value Ref Range Status   02/21/2024 04:50 PM 75 >=50 mg/dL Final     LDL Calculated   Date/Time Value Ref Range Status   02/21/2024 04:50  (H) 0 - 100 mg/dL Final     Comment:     LDL Cholesterol:     Optimal           <100 mg/dl     Near Optimal      100-129 mg/dl     Above Optimal       Borderline High 130-159 mg/dl       High            160-189 mg/dl       Very High       >189 mg/dl         This screening LDL is a calculated result.   It does not have the accuracy of the Direct Measured LDL in the monitoring of patients with hyperlipidemia and/or statin therapy.   Direct Measure LDL ([id number]) must be ordered separately in these patients.     Hemoglobin   Date/Time Value Ref Range Status   02/21/2024 04:50 PM 12.2 11.5 - 15.4 g/dL Final     Hematocrit   Date/Time Value Ref Range Status   02/21/2024 04:50 PM 37.6 34.8 - 46.1 % Final     Platelets   Date/Time Value Ref Range Status   02/21/2024 04:50  149 - 390 Thousands/uL Final     Sodium   Date/Time Value Ref Range Status   02/21/2024 04:50  135 - 147 mmol/L Final   01/02/2024 03:21  135 - 145  mmol/L Final     BUN   Date/Time Value Ref Range Status   02/21/2024 04:50 PM 31 (H) 5 - 25 mg/dL Final   01/02/2024 03:21 PM 27 (H) 7 - 25 mg/dL Final     Creatinine   Date/Time Value Ref Range Status   02/21/2024 04:50 PM 0.70 0.60 - 1.30 mg/dL Final     Comment:     Standardized to IDMS reference method   01/02/2024 03:21 PM 0.76 0.40 - 1.10 mg/dL Final      In chart    This note was created with voice recognition software.  Phonic, grammatical and spelling errors may be present within the note as a result.

## 2024-03-13 NOTE — TELEPHONE ENCOUNTER
Call from Dr. Moran's office (Otolaryngology).  Patient went to see him in January and insurance ([employer]) is refusing to pay because they require a referral. Please fax to their office at [phone].

## 2024-03-14 DIAGNOSIS — D64.9 ANEMIA, UNSPECIFIED TYPE: ICD-10-CM

## 2024-03-14 RX ORDER — FERROUS SULFATE 325(65) MG
1 TABLET ORAL
Qty: 30 TABLET | Refills: 3 | Status: SHIPPED | OUTPATIENT
Start: 2024-03-14

## 2024-03-15 ENCOUNTER — TELEPHONE (OUTPATIENT)
Dept: INTERNAL MEDICINE CLINIC | Facility: CLINIC | Age: 69
End: 2024-03-15

## 2024-03-15 NOTE — TELEPHONE ENCOUNTER
ENT order sent to Dr. Moran office although I explained to patient that it is more than likely an insurance referral not an order, she will contact Dr. Moran's office for further explanation.  Order faxed.

## 2024-03-28 DIAGNOSIS — M19.91 PRIMARY OSTEOARTHRITIS, UNSPECIFIED SITE: ICD-10-CM

## 2024-03-28 RX ORDER — MELOXICAM 7.5 MG/1
7.5 TABLET ORAL DAILY PRN
Qty: 30 TABLET | Refills: 3 | Status: SHIPPED | OUTPATIENT
Start: 2024-03-28

## 2024-04-15 DIAGNOSIS — I10 ESSENTIAL HYPERTENSION: ICD-10-CM

## 2024-04-15 RX ORDER — HYDROCHLOROTHIAZIDE 25 MG/1
25 TABLET ORAL DAILY
Qty: 90 TABLET | Refills: 1 | Status: SHIPPED | OUTPATIENT
Start: 2024-04-15

## 2024-05-01 DIAGNOSIS — I10 HYPERTENSION, BENIGN: ICD-10-CM

## 2024-05-02 RX ORDER — METOPROLOL TARTRATE 50 MG/1
50 TABLET, FILM COATED ORAL 2 TIMES DAILY
Qty: 180 TABLET | Refills: 1 | Status: SHIPPED | OUTPATIENT
Start: 2024-05-02

## 2024-05-10 DIAGNOSIS — I10 HYPERTENSION, BENIGN: ICD-10-CM

## 2024-05-10 RX ORDER — VALSARTAN 320 MG/1
320 TABLET ORAL DAILY
Qty: 90 TABLET | Refills: 1 | Status: SHIPPED | OUTPATIENT
Start: 2024-05-10

## 2024-05-10 NOTE — TELEPHONE ENCOUNTER
Medication: valsartan (DIOVAN)     Dose/Frequency:320 MG tablet 1 every day     TAKE 1 TABLET EVERY DAY     Quantity: 90 tablet    Pharmacy: [facility] [id number] - [place], [place] - [facility] [address]     Office:   [x] PCP/Provider -   [] Speciality/Provider -     Does the patient have enough for 3 days?   [] Yes   [x] No - Send as HP to POD

## 2024-05-13 ENCOUNTER — APPOINTMENT (OUTPATIENT)
Age: 69
DRG: 392 | End: 2024-05-13
Payer: COMMERCIAL

## 2024-05-13 DIAGNOSIS — E78.2 MIXED HYPERLIPIDEMIA: ICD-10-CM

## 2024-05-13 LAB
ALBUMIN SERPL BCP-MCNC: 4.1 G/DL (ref 3.5–5)
ALP SERPL-CCNC: 69 U/L (ref 34–104)
ALT SERPL W P-5'-P-CCNC: 12 U/L (ref 7–52)
ANION GAP SERPL CALCULATED.3IONS-SCNC: 8 MMOL/L (ref 4–13)
AST SERPL W P-5'-P-CCNC: 21 U/L (ref 13–39)
BILIRUB SERPL-MCNC: 0.56 MG/DL (ref 0.2–1)
BUN SERPL-MCNC: 32 MG/DL (ref 5–25)
CALCIUM SERPL-MCNC: 9.1 MG/DL (ref 8.4–10.2)
CHLORIDE SERPL-SCNC: 104 MMOL/L (ref 96–108)
CHOLEST SERPL-MCNC: 202 MG/DL
CO2 SERPL-SCNC: 26 MMOL/L (ref 21–32)
CREAT SERPL-MCNC: 0.88 MG/DL (ref 0.6–1.3)
GFR SERPL CREATININE-BSD FRML MDRD: 67 ML/MIN/1.73SQ M
GLUCOSE P FAST SERPL-MCNC: 108 MG/DL (ref 65–99)
HDLC SERPL-MCNC: 73 MG/DL
LDLC SERPL CALC-MCNC: 118 MG/DL (ref 0–100)
NONHDLC SERPL-MCNC: 129 MG/DL
POTASSIUM SERPL-SCNC: 4.6 MMOL/L (ref 3.5–5.3)
PROT SERPL-MCNC: 6.7 G/DL (ref 6.4–8.4)
SODIUM SERPL-SCNC: 138 MMOL/L (ref 135–147)
TRIGL SERPL-MCNC: 56 MG/DL

## 2024-05-13 PROCEDURE — 80053 COMPREHEN METABOLIC PANEL: CPT

## 2024-05-13 PROCEDURE — 80061 LIPID PANEL: CPT

## 2024-05-13 PROCEDURE — 36415 COLL VENOUS BLD VENIPUNCTURE: CPT

## 2024-05-15 ENCOUNTER — APPOINTMENT (EMERGENCY)
Dept: CT IMAGING | Facility: HOSPITAL | Age: 69
DRG: 392 | End: 2024-05-15
Payer: COMMERCIAL

## 2024-05-15 ENCOUNTER — HOSPITAL ENCOUNTER (INPATIENT)
Facility: HOSPITAL | Age: 69
LOS: 1 days | Discharge: HOME | DRG: 392 | End: 2024-05-17
Attending: EMERGENCY MEDICINE | Admitting: STUDENT IN AN ORGANIZED HEALTH CARE EDUCATION/TRAINING PROGRAM
Payer: COMMERCIAL

## 2024-05-15 DIAGNOSIS — K57.92 DIVERTICULITIS: Primary | ICD-10-CM

## 2024-05-15 DIAGNOSIS — R10.32 LEFT LOWER QUADRANT ABDOMINAL PAIN: ICD-10-CM

## 2024-05-15 LAB
ALBUMIN SERPL BCP-MCNC: 4 G/DL (ref 3.5–5)
ALP SERPL-CCNC: 86 U/L (ref 34–104)
ALT SERPL W P-5'-P-CCNC: 12 U/L (ref 7–52)
ANION GAP SERPL CALCULATED.3IONS-SCNC: 6 MMOL/L (ref 4–13)
AST SERPL W P-5'-P-CCNC: 18 U/L (ref 13–39)
BASOPHILS # BLD AUTO: 0.03 THOUSANDS/ÂΜL (ref 0–0.1)
BASOPHILS NFR BLD AUTO: 0 % (ref 0–1)
BILIRUB SERPL-MCNC: 0.53 MG/DL (ref 0.2–1)
BILIRUB UR QL STRIP: NEGATIVE
BUN SERPL-MCNC: 19 MG/DL (ref 5–25)
CALCIUM SERPL-MCNC: 9.5 MG/DL (ref 8.4–10.2)
CHLORIDE SERPL-SCNC: 107 MMOL/L (ref 96–108)
CLARITY UR: CLEAR
CO2 SERPL-SCNC: 25 MMOL/L (ref 21–32)
COLOR UR: COLORLESS
CREAT SERPL-MCNC: 0.84 MG/DL (ref 0.6–1.3)
EOSINOPHIL # BLD AUTO: 0.19 THOUSAND/ÂΜL (ref 0–0.61)
EOSINOPHIL NFR BLD AUTO: 3 % (ref 0–6)
ERYTHROCYTE [DISTWIDTH] IN BLOOD BY AUTOMATED COUNT: 14.1 % (ref 11.6–15.1)
GFR SERPL CREATININE-BSD FRML MDRD: 71 ML/MIN/1.73SQ M
GLUCOSE SERPL-MCNC: 142 MG/DL (ref 65–140)
GLUCOSE UR STRIP-MCNC: NEGATIVE MG/DL
HCT VFR BLD AUTO: 36.8 % (ref 34.8–46.1)
HGB BLD-MCNC: 12.3 G/DL (ref 11.5–15.4)
HGB UR QL STRIP.AUTO: NEGATIVE
IMM GRANULOCYTES # BLD AUTO: 0.02 THOUSAND/UL (ref 0–0.2)
IMM GRANULOCYTES NFR BLD AUTO: 0 % (ref 0–2)
KETONES UR STRIP-MCNC: NEGATIVE MG/DL
LEUKOCYTE ESTERASE UR QL STRIP: NEGATIVE
LIPASE SERPL-CCNC: 15 U/L (ref 11–82)
LYMPHOCYTES # BLD AUTO: 1.12 THOUSANDS/ÂΜL (ref 0.6–4.47)
LYMPHOCYTES NFR BLD AUTO: 15 % (ref 14–44)
MCH RBC QN AUTO: 29.9 PG (ref 26.8–34.3)
MCHC RBC AUTO-ENTMCNC: 33.4 G/DL (ref 31.4–37.4)
MCV RBC AUTO: 90 FL (ref 82–98)
MONOCYTES # BLD AUTO: 0.79 THOUSAND/ÂΜL (ref 0.17–1.22)
MONOCYTES NFR BLD AUTO: 10 % (ref 4–12)
NEUTROPHILS # BLD AUTO: 5.42 THOUSANDS/ÂΜL (ref 1.85–7.62)
NEUTS SEG NFR BLD AUTO: 72 % (ref 43–75)
NITRITE UR QL STRIP: NEGATIVE
NRBC BLD AUTO-RTO: 0 /100 WBCS
PH UR STRIP.AUTO: 5.5 [PH]
PLATELET # BLD AUTO: 174 THOUSANDS/UL (ref 149–390)
PMV BLD AUTO: 9.2 FL (ref 8.9–12.7)
POTASSIUM SERPL-SCNC: 4.1 MMOL/L (ref 3.5–5.3)
PROT SERPL-MCNC: 7.5 G/DL (ref 6.4–8.4)
PROT UR STRIP-MCNC: NEGATIVE MG/DL
RBC # BLD AUTO: 4.11 MILLION/UL (ref 3.81–5.12)
SODIUM SERPL-SCNC: 138 MMOL/L (ref 135–147)
SP GR UR STRIP.AUTO: 1.02 (ref 1–1.03)
UROBILINOGEN UR STRIP-ACNC: <2 MG/DL
WBC # BLD AUTO: 7.57 THOUSAND/UL (ref 4.31–10.16)

## 2024-05-15 PROCEDURE — 83690 ASSAY OF LIPASE: CPT | Performed by: EMERGENCY MEDICINE

## 2024-05-15 PROCEDURE — 99284 EMERGENCY DEPT VISIT MOD MDM: CPT

## 2024-05-15 PROCEDURE — 74177 CT ABD & PELVIS W/CONTRAST: CPT

## 2024-05-15 PROCEDURE — 99285 EMERGENCY DEPT VISIT HI MDM: CPT | Performed by: EMERGENCY MEDICINE

## 2024-05-15 PROCEDURE — 81003 URINALYSIS AUTO W/O SCOPE: CPT | Performed by: EMERGENCY MEDICINE

## 2024-05-15 PROCEDURE — 36415 COLL VENOUS BLD VENIPUNCTURE: CPT | Performed by: EMERGENCY MEDICINE

## 2024-05-15 PROCEDURE — 96376 TX/PRO/DX INJ SAME DRUG ADON: CPT

## 2024-05-15 PROCEDURE — 85025 COMPLETE CBC W/AUTO DIFF WBC: CPT | Performed by: EMERGENCY MEDICINE

## 2024-05-15 PROCEDURE — 96361 HYDRATE IV INFUSION ADD-ON: CPT

## 2024-05-15 PROCEDURE — 96374 THER/PROPH/DIAG INJ IV PUSH: CPT

## 2024-05-15 PROCEDURE — 99223 1ST HOSP IP/OBS HIGH 75: CPT | Performed by: FAMILY MEDICINE

## 2024-05-15 PROCEDURE — 80053 COMPREHEN METABOLIC PANEL: CPT | Performed by: EMERGENCY MEDICINE

## 2024-05-15 PROCEDURE — 96375 TX/PRO/DX INJ NEW DRUG ADDON: CPT

## 2024-05-15 RX ORDER — METOPROLOL TARTRATE 50 MG/1
50 TABLET, FILM COATED ORAL 2 TIMES DAILY
Status: DISCONTINUED | OUTPATIENT
Start: 2024-05-15 | End: 2024-05-17 | Stop reason: HOSPADM

## 2024-05-15 RX ORDER — PRAVASTATIN SODIUM 40 MG
40 TABLET ORAL
Status: DISCONTINUED | OUTPATIENT
Start: 2024-05-15 | End: 2024-05-17 | Stop reason: HOSPADM

## 2024-05-15 RX ORDER — METRONIDAZOLE 500 MG/100ML
500 INJECTION, SOLUTION INTRAVENOUS EVERY 8 HOURS
Status: DISCONTINUED | OUTPATIENT
Start: 2024-05-15 | End: 2024-05-17 | Stop reason: HOSPADM

## 2024-05-15 RX ORDER — LOSARTAN POTASSIUM 50 MG/1
100 TABLET ORAL DAILY
Status: DISCONTINUED | OUTPATIENT
Start: 2024-05-15 | End: 2024-05-17 | Stop reason: HOSPADM

## 2024-05-15 RX ORDER — ONDANSETRON 2 MG/ML
4 INJECTION INTRAMUSCULAR; INTRAVENOUS EVERY 6 HOURS PRN
Status: DISCONTINUED | OUTPATIENT
Start: 2024-05-15 | End: 2024-05-17 | Stop reason: HOSPADM

## 2024-05-15 RX ORDER — HEPARIN SODIUM 5000 [USP'U]/ML
5000 INJECTION, SOLUTION INTRAVENOUS; SUBCUTANEOUS EVERY 8 HOURS SCHEDULED
Status: DISCONTINUED | OUTPATIENT
Start: 2024-05-15 | End: 2024-05-17 | Stop reason: HOSPADM

## 2024-05-15 RX ORDER — AMOXICILLIN AND CLAVULANATE POTASSIUM 875; 125 MG/1; MG/1
1 TABLET, FILM COATED ORAL EVERY 12 HOURS
Qty: 14 TABLET | Refills: 0 | Status: SHIPPED | OUTPATIENT
Start: 2024-05-15 | End: 2024-05-22

## 2024-05-15 RX ORDER — SODIUM CHLORIDE, SODIUM GLUCONATE, SODIUM ACETATE, POTASSIUM CHLORIDE, MAGNESIUM CHLORIDE, SODIUM PHOSPHATE, DIBASIC, AND POTASSIUM PHOSPHATE .53; .5; .37; .037; .03; .012; .00082 G/100ML; G/100ML; G/100ML; G/100ML; G/100ML; G/100ML; G/100ML
75 INJECTION, SOLUTION INTRAVENOUS CONTINUOUS
Status: DISCONTINUED | OUTPATIENT
Start: 2024-05-15 | End: 2024-05-17 | Stop reason: HOSPADM

## 2024-05-15 RX ORDER — FERROUS SULFATE 325(65) MG
325 TABLET ORAL
Status: DISCONTINUED | OUTPATIENT
Start: 2024-05-16 | End: 2024-05-17 | Stop reason: HOSPADM

## 2024-05-15 RX ORDER — HYDROCHLOROTHIAZIDE 25 MG/1
25 TABLET ORAL DAILY
Status: DISCONTINUED | OUTPATIENT
Start: 2024-05-15 | End: 2024-05-17 | Stop reason: HOSPADM

## 2024-05-15 RX ORDER — NIFEDIPINE 30 MG/1
30 TABLET, EXTENDED RELEASE ORAL DAILY
Status: DISCONTINUED | OUTPATIENT
Start: 2024-05-15 | End: 2024-05-17 | Stop reason: HOSPADM

## 2024-05-15 RX ORDER — MORPHINE SULFATE 4 MG/ML
4 INJECTION, SOLUTION INTRAMUSCULAR; INTRAVENOUS ONCE
Status: COMPLETED | OUTPATIENT
Start: 2024-05-15 | End: 2024-05-15

## 2024-05-15 RX ORDER — PANTOPRAZOLE SODIUM 40 MG/1
40 TABLET, DELAYED RELEASE ORAL
Status: DISCONTINUED | OUTPATIENT
Start: 2024-05-15 | End: 2024-05-17 | Stop reason: HOSPADM

## 2024-05-15 RX ORDER — KETOROLAC TROMETHAMINE 30 MG/ML
15 INJECTION, SOLUTION INTRAMUSCULAR; INTRAVENOUS ONCE
Status: COMPLETED | OUTPATIENT
Start: 2024-05-15 | End: 2024-05-15

## 2024-05-15 RX ORDER — ACETAMINOPHEN 325 MG/1
650 TABLET ORAL EVERY 6 HOURS PRN
Status: DISCONTINUED | OUTPATIENT
Start: 2024-05-15 | End: 2024-05-17 | Stop reason: HOSPADM

## 2024-05-15 RX ORDER — OXYCODONE HYDROCHLORIDE 5 MG/1
5 TABLET ORAL EVERY 6 HOURS PRN
Status: DISCONTINUED | OUTPATIENT
Start: 2024-05-15 | End: 2024-05-17 | Stop reason: HOSPADM

## 2024-05-15 RX ORDER — ONDANSETRON 4 MG/1
4 TABLET, FILM COATED ORAL EVERY 6 HOURS PRN
Qty: 12 TABLET | Refills: 0 | Status: SHIPPED | OUTPATIENT
Start: 2024-05-15

## 2024-05-15 RX ORDER — CIPROFLOXACIN 2 MG/ML
400 INJECTION, SOLUTION INTRAVENOUS EVERY 12 HOURS
Status: DISCONTINUED | OUTPATIENT
Start: 2024-05-15 | End: 2024-05-17 | Stop reason: HOSPADM

## 2024-05-15 RX ADMIN — MORPHINE SULFATE 4 MG: 4 INJECTION INTRAVENOUS at 09:56

## 2024-05-15 RX ADMIN — SODIUM CHLORIDE 1000 ML: 0.9 INJECTION, SOLUTION INTRAVENOUS at 09:55

## 2024-05-15 RX ADMIN — AMPICILLIN SODIUM AND SULBACTAM SODIUM 3 G: 2; 1 INJECTION, POWDER, FOR SOLUTION INTRAMUSCULAR; INTRAVENOUS at 12:36

## 2024-05-15 RX ADMIN — METRONIDAZOLE 500 MG: 500 INJECTION, SOLUTION INTRAVENOUS at 13:50

## 2024-05-15 RX ADMIN — MORPHINE SULFATE 4 MG: 4 INJECTION INTRAVENOUS at 12:36

## 2024-05-15 RX ADMIN — HEPARIN SODIUM 5000 UNITS: 5000 INJECTION INTRAVENOUS; SUBCUTANEOUS at 21:56

## 2024-05-15 RX ADMIN — IOHEXOL 100 ML: 350 INJECTION, SOLUTION INTRAVENOUS at 10:41

## 2024-05-15 RX ADMIN — METRONIDAZOLE 500 MG: 500 INJECTION, SOLUTION INTRAVENOUS at 21:56

## 2024-05-15 RX ADMIN — KETOROLAC TROMETHAMINE 15 MG: 30 INJECTION, SOLUTION INTRAMUSCULAR; INTRAVENOUS at 09:56

## 2024-05-15 RX ADMIN — HEPARIN SODIUM 5000 UNITS: 5000 INJECTION INTRAVENOUS; SUBCUTANEOUS at 13:50

## 2024-05-15 RX ADMIN — PANTOPRAZOLE SODIUM 40 MG: 40 TABLET, DELAYED RELEASE ORAL at 13:49

## 2024-05-15 RX ADMIN — OXYCODONE HYDROCHLORIDE 5 MG: 5 TABLET ORAL at 19:38

## 2024-05-15 RX ADMIN — METOPROLOL TARTRATE 50 MG: 50 TABLET, FILM COATED ORAL at 18:34

## 2024-05-15 RX ADMIN — SODIUM CHLORIDE, SODIUM GLUCONATE, SODIUM ACETATE, POTASSIUM CHLORIDE, MAGNESIUM CHLORIDE, SODIUM PHOSPHATE, DIBASIC, AND POTASSIUM PHOSPHATE 75 ML/HR: .53; .5; .37; .037; .03; .012; .00082 INJECTION, SOLUTION INTRAVENOUS at 13:50

## 2024-05-15 RX ADMIN — CIPROFLOXACIN 400 MG: 400 INJECTION, SOLUTION INTRAVENOUS at 16:28

## 2024-05-15 NOTE — ED NOTES
Chelsy Alford brought from  to ED treatment room 17 via WC/Ambulation/Stretcher.  Patient instructed to change into gown.  Call bell placed within reach.  Baseline vital signs obtained.     Americo Trejo  05/15/24 0939

## 2024-05-15 NOTE — ASSESSMENT & PLAN NOTE
C/c : 69-year-old lady with prior history of diverticulitis presenting to the ED with 2 to 3-day history of progressive left lower quadrant pain.  Denies any associated nausea vomiting.  In the ED patient received morphine/Toradol with persistent pain.  Being admitted as observation level of care.    CT notes evidence of diverticulitis in distal colon/proximal sigmoid  ED provider did try discharging patient on outpatient antibiotics however due to her significant pain [uncontrolled despite IV pain medications] patient being admitted as observation level of care  N.p.o./IV fluids  Started IV Cipro/Flagyl  As needed Tylenol/oxycodone/morphine ordered for pain control.

## 2024-05-15 NOTE — ASSESSMENT & PLAN NOTE
Prior history of CA breast s/p chemoradiation/lymph node dissection in 2003  Follows for outpatient surveillance with oncology

## 2024-05-15 NOTE — ED PROVIDER NOTES
History  Chief Complaint   Patient presents with    Abdominal Pain     LLQ abd pain for the past few days. Hx of diverticulitis, but unsure of this is the same. Denies N/V     69-year-old female history of breast cancer presenting with abdominal pain.  Patient reports intermittent left lower quadrant abdominal pain over the last few days worsening recently.  Pain is now constant and sharp and stabbing.  Denies any radiation.  Denies any nausea vomiting diarrhea.  Reports previous history of  but denies any other abdominal surgery.  Denies any urinary changes.  Denies any chest pain shortness of breath.  Denies any other complaints.  Chart reviewed.    Past Medical History:  No date: Anemia  No date: Arthritis  No date: Bacterial pneumonia      Comment:  last assessed: 2017  No date: Borderline diabetes  2003: Breast cancer (HCC)      Comment:  right  No date: Depression  No date: Diastolic dysfunction  No date: GERD (gastroesophageal reflux disease)  No date: High cholesterol  2003: History of chemotherapy      Comment:  right breast ca  2003: History of radiation therapy      Comment:  right breast ca  No date: Hypertension  No date: Insomnia  No date: Palpitations  Family History: non-contributory  Social History          Prior to Admission Medications   Prescriptions Last Dose Informant Patient Reported? Taking?   NIFEdipine ER (ADALAT CC) 30 MG 24 hr tablet   No No   Sig: Take 1 tablet (30 mg total) by mouth daily   RESTASIS 0.05 % ophthalmic emulsion  Self Yes No   Sig: Administer 1 drop to both eyes every 12 (twelve) hours   acetaminophen (TYLENOL) 325 mg tablet  Self No No   Sig: Take 2 tablets (650 mg total) by mouth every 6 (six) hours   ergocalciferol (ERGOCALCIFEROL) 1.25 MG (65726 UT) capsule   Yes No   Sig: Take 50,000 Units by mouth once a week   ferrous sulfate 325 (65 Fe) mg tablet   No No   Sig: TAKE 1 TABLET (325 MG TOTAL) BY MOUTH DAILY WITH BREAKFAST   hydroCHLOROthiazide 25 mg  tablet   No No   Sig: Take 1 tablet (25 mg total) by mouth daily   meloxicam (MOBIC) 7.5 mg tablet   No No   Sig: Take 1 tablet (7.5 mg total) by mouth daily as needed for moderate pain   metoprolol tartrate (LOPRESSOR) 50 mg tablet   No No   Sig: TAKE 1 TABLET (50 MG TOTAL) BY MOUTH 2 (TWO) TIMES A DAY   pantoprazole (PROTONIX) 40 mg tablet   No No   Sig: TAKE ONE (1) TABLET BY MOUTH DAILY IN THE MORNING   potassium chloride (K-DUR,KLOR-CON) 10 mEq tablet   No No   Sig: TAKE ONE (1) TABLET BY MOUTH TWICE DAILY   sertraline (ZOLOFT) 50 mg tablet   No No   Sig: TAKE 1 TABLET (50 MG TOTAL) BY MOUTH DAILY   simvastatin (ZOCOR) 20 mg tablet   No No   Sig: Take 1 tablet (20 mg total) by mouth daily at bedtime   valsartan (DIOVAN) 320 MG tablet   No No   Sig: Take 1 tablet (320 mg total) by mouth daily   zolpidem (AMBIEN) 5 mg tablet   No No   Sig: Take 1 tablet (5 mg total) by mouth daily at bedtime as needed for sleep      Facility-Administered Medications: None       Past Medical History:   Diagnosis Date    Anemia     Arthritis     Bacterial pneumonia     last assessed: 2017    Borderline diabetes     Breast cancer (HCC) 2003    right    Depression     Diastolic dysfunction     GERD (gastroesophageal reflux disease)     High cholesterol     History of chemotherapy 2003    right breast ca    History of radiation therapy 2003    right breast ca    Hypertension     Insomnia     Palpitations        Past Surgical History:   Procedure Laterality Date    BREAST LUMPECTOMY Right 2003     SECTION      COLONOSCOPY      ESOPHAGOGASTRODUODENOSCOPY N/A 2019    Procedure: ESOPHAGOGASTRODUODENOSCOPY (EGD);  Surgeon: Poli Riojas III, MD;  Location: [facility];  Service: Gastroenterology    JOINT REPLACEMENT Right     knee    KNEE SURGERY      OH ARTHRP KNE CONDYLE&PLATU MEDIAL&LAT COMPARTMENTS Right 2016    Procedure: ARTHROPLASTY KNEE TOTAL;  Surgeon: Angela Noel MD;  Location:  MAIN OR;   Service: Orthopedics    VT ARTHRP KNE CONDYLE&PLATU MEDIAL&LAT COMPARTMENTS Left 03/15/2023    Procedure: Left total knee arthroplasty;  Surgeon: Tony Cano DO;  Location: [facility];  Service: Orthopedics    VT COLONOSCOPY FLX DX W/COLLJ SPEC WHEN PFRMD N/A 12/31/2018    Procedure: COLONOSCOPY;  Surgeon: Poli Riojas III, MD;  Location: [facility];  Service: Gastroenterology    VT ESOPHAGOGASTRODUODENOSCOPY TRANSORAL DIAGNOSTIC N/A 12/31/2018    Procedure: ESOPHAGOGASTRODUODENOSCOPY (EGD);  Surgeon: Poli Riojas III, MD;  Location: [facility];  Service: Gastroenterology       Family History   Problem Relation Age of Onset    Diabetes Mother     Diabetes Father     Diabetes Sister     Cancer Sister     Diabetes Brother     No Known Problems Son     Thyroid disease Daughter     Diabetes Brother     No Known Problems Brother     No Known Problems Brother     No Known Problems Brother     No Known Problems Brother     No Known Problems Brother     Diabetes Sister     Cancer Sister     Diabetes Sister     Cancer Sister     No Known Problems Sister     No Known Problems Sister     No Known Problems Sister     No Known Problems Son     Breast cancer Neg Hx      I have reviewed and agree with the history as documented.    E-Cigarette/Vaping    E-Cigarette Use Never User      E-Cigarette/Vaping Substances    Nicotine No     THC No     CBD No     Flavoring No     Other No     Unknown No      Social History     Tobacco Use    Smoking status: Never    Smokeless tobacco: Never   Vaping Use    Vaping status: Never Used   Substance Use Topics    Alcohol use: Never    Drug use: No       Review of Systems   Constitutional:  Negative for appetite change, chills, diaphoresis, fever and unexpected weight change.   HENT:  Negative for congestion and rhinorrhea.    Eyes:  Negative for photophobia and visual disturbance.   Respiratory:  Negative for cough, chest tightness and shortness of breath.    Cardiovascular:  Negative  for chest pain, palpitations and leg swelling.   Gastrointestinal:  Positive for abdominal pain. Negative for abdominal distention, blood in stool, constipation, diarrhea, nausea and vomiting.   Genitourinary:  Negative for dysuria and hematuria.   Musculoskeletal:  Negative for back pain, joint swelling, neck pain and neck stiffness.   Skin:  Negative for color change, pallor, rash and wound.   Neurological:  Negative for dizziness, syncope, weakness, light-headedness and headaches.   Psychiatric/Behavioral:  Negative for agitation.    All other systems reviewed and are negative.      Physical Exam  Physical Exam  Vitals and nursing note reviewed.   Constitutional:       General: She is not in acute distress.     Appearance: Normal appearance. She is well-developed. She is not ill-appearing, toxic-appearing or diaphoretic.   HENT:      Head: Normocephalic and atraumatic.      Nose: Nose normal. No congestion or rhinorrhea.      Mouth/Throat:      Mouth: Mucous membranes are moist.      Pharynx: Oropharynx is clear. No oropharyngeal exudate or posterior oropharyngeal erythema.   Eyes:      General: No scleral icterus.        Right eye: No discharge.         Left eye: No discharge.      Extraocular Movements: Extraocular movements intact.      Conjunctiva/sclera: Conjunctivae normal.      Pupils: Pupils are equal, round, and reactive to light.   Neck:      Vascular: No JVD.      Trachea: No tracheal deviation.      Comments: Supple. Normal range of motion.   Cardiovascular:      Rate and Rhythm: Normal rate and regular rhythm.      Heart sounds: Normal heart sounds. No murmur heard.     No friction rub. No gallop.      Comments: Normal rate and regular rhythm  Pulmonary:      Effort: Pulmonary effort is normal. No respiratory distress.      Breath sounds: Normal breath sounds. No stridor. No wheezing or rales.      Comments: Clear to auscultation bilaterally  Chest:      Chest wall: No tenderness.   Abdominal:       General: Bowel sounds are normal. There is no distension.      Palpations: Abdomen is soft.      Tenderness: There is abdominal tenderness in the left lower quadrant. There is guarding. There is no right CVA tenderness, left CVA tenderness or rebound.      Comments: Left lower quadrant tenderness with guarding.  Otherwise soft and nondistended.  Normal bowel sounds throughout   Musculoskeletal:         General: No swelling, tenderness, deformity or signs of injury. Normal range of motion.      Cervical back: Normal range of motion and neck supple. No rigidity. No muscular tenderness.      Right lower leg: No edema.      Left lower leg: No edema.   Lymphadenopathy:      Cervical: No cervical adenopathy.   Skin:     General: Skin is warm and dry.      Coloration: Skin is not pale.      Findings: No erythema or rash.   Neurological:      General: No focal deficit present.      Mental Status: She is alert. Mental status is at baseline.      Sensory: No sensory deficit.      Motor: No weakness or abnormal muscle tone.      Coordination: Coordination normal.      Gait: Gait normal.      Comments: Alert.  Strength and sensation grossly intact.  Ambulatory without difficulty at baseline.    Psychiatric:         Behavior: Behavior normal.         Thought Content: Thought content normal.         Vital Signs  ED Triage Vitals   Temperature Pulse Respirations Blood Pressure SpO2   05/15/24 0837 05/15/24 0837 05/15/24 0837 05/15/24 0837 05/15/24 0837   98.1 °F (36.7 °C) 90 18 142/88 98 %      Temp Source Heart Rate Source Patient Position - Orthostatic VS BP Location FiO2 (%)   05/15/24 0837 05/15/24 0837 05/15/24 0837 05/15/24 0837 --   Oral Monitor Sitting Left arm       Pain Score       05/15/24 0945       10 - Worst Possible Pain           Vitals:    05/15/24 0837 05/15/24 0937 05/15/24 1000 05/15/24 1158   BP: 142/88 169/88 165/96 157/93   Pulse: 90 78 76 60   Patient Position - Orthostatic VS: Sitting Lying Sitting Sitting          Visual Acuity  Visual Acuity      Flowsheet Row Most Recent Value   L Pupil Size (mm) 3   R Pupil Size (mm) 3            ED Medications  Medications   ampicillin-sulbactam (UNASYN) 3 g in sodium chloride 0.9 % 100 mL IVPB (3 g Intravenous New Bag 5/15/24 1236)   sodium chloride 0.9 % bolus 1,000 mL (1,000 mL Intravenous New Bag 5/15/24 0955)   ketorolac (TORADOL) injection 15 mg (15 mg Intravenous Given 5/15/24 0956)   morphine injection 4 mg (4 mg Intravenous Given 5/15/24 0956)   iohexol (OMNIPAQUE) 350 MG/ML injection (MULTI-DOSE) 100 mL (100 mL Intravenous Given 5/15/24 1041)   morphine injection 4 mg (4 mg Intravenous Given 5/15/24 1236)       Diagnostic Studies  Results Reviewed       Procedure Component Value Units Date/Time    UA w Reflex to Microscopic w Reflex to Culture [[id number]] Collected: 05/15/24 1104    Lab Status: Final result Specimen: Urine, Clean Catch Updated: 05/15/24 1120     Color, UA Colorless     Clarity, UA Clear     Specific Gravity, UA 1.025     pH, UA 5.5     Leukocytes, UA Negative     Nitrite, UA Negative     Protein, UA Negative mg/dl      Glucose, UA Negative mg/dl      Ketones, UA Negative mg/dl      Urobilinogen, UA <2.0 mg/dl      Bilirubin, UA Negative     Occult Blood, UA Negative    Comprehensive metabolic panel [[id number]]  (Abnormal) Collected: 05/15/24 0952    Lab Status: Final result Specimen: Blood from Arm, Left Updated: 05/15/24 1025     Sodium 138 mmol/L      Potassium 4.1 mmol/L      Chloride 107 mmol/L      CO2 25 mmol/L      ANION GAP 6 mmol/L      BUN 19 mg/dL      Creatinine 0.84 mg/dL      Glucose 142 mg/dL      Calcium 9.5 mg/dL      AST 18 U/L      ALT 12 U/L      Alkaline Phosphatase 86 U/L      Total Protein 7.5 g/dL      Albumin 4.0 g/dL      Total Bilirubin 0.53 mg/dL      eGFR 71 ml/min/1.73sq m     Narrative:      [employer] guidelines for Chronic Kidney Disease (CKD):     Stage 1 with normal or high GFR (GFR > 90  mL/min/1.73 square meters)    Stage 2 Mild CKD (GFR = 60-89 mL/min/1.73 square meters)    Stage 3A Moderate CKD (GFR = 45-59 mL/min/1.73 square meters)    Stage 3B Moderate CKD (GFR = 30-44 mL/min/1.73 square meters)    Stage 4 Severe CKD (GFR = 15-29 mL/min/1.73 square meters)    Stage 5 End Stage CKD (GFR <15 mL/min/1.73 square meters)  Note: GFR calculation is accurate only with a steady state creatinine    Lipase [[id number]]  (Normal) Collected: 05/15/24 0952    Lab Status: Final result Specimen: Blood from Arm, Left Updated: 05/15/24 1025     Lipase 15 u/L     CBC and differential [[id number]] Collected: 05/15/24 0952    Lab Status: Final result Specimen: Blood from Arm, Left Updated: 05/15/24 1001     WBC 7.57 Thousand/uL      RBC 4.11 Million/uL      Hemoglobin 12.3 g/dL      Hematocrit 36.8 %      MCV 90 fL      MCH 29.9 pg      MCHC 33.4 g/dL      RDW 14.1 %      MPV 9.2 fL      Platelets 174 Thousands/uL      nRBC 0 /100 WBCs      Segmented % 72 %      Immature Grans % 0 %      Lymphocytes % 15 %      Monocytes % 10 %      Eosinophils Relative 3 %      Basophils Relative 0 %      Absolute Neutrophils 5.42 Thousands/µL      Absolute Immature Grans 0.02 Thousand/uL      Absolute Lymphocytes 1.12 Thousands/µL      Absolute Monocytes 0.79 Thousand/µL      Eosinophils Absolute 0.19 Thousand/µL      Basophils Absolute 0.03 Thousands/µL                    CT abdomen pelvis with contrast   Final Result by Georgie Ricks MD (05/15 1125)      Diverticulitis in distal descending colon/proximal sigmoid   No fluid collection   No free air   Consider colonoscopy with acute illness subsides to exclude any colonic lesion         Workstation performed: [id number]                    Procedures  Procedures         ED Course                            Initial Sepsis Screening       Row Name 05/15/24 2669                Is the patient's history suggestive of a new or worsening infection? Yes (Proceed)  -CE        Suspected  source of infection acute abdominal infection  -CE        Indicate SIRS criteria --        Are two or more of the above signs & symptoms of infection both present and new to the patient? No  -CE                  User Key  (r) = Recorded By, (t) = Taken By, (c) = Cosigned By      Initials Name Provider Type    CE Tae Marsh MD Physician                    SBIRT 22yo+      Flowsheet Row Most Recent Value   Initial Alcohol Screen: US AUDIT-C     1. How often do you have a drink containing alcohol? 0 Filed at: 05/15/2024 1005   2. How many drinks containing alcohol do you have on a typical day you are drinking?  0 Filed at: 05/15/2024 1005   3b. FEMALE Any Age, or MALE 65+: How often do you have 4 or more drinks on one occassion? 0 Filed at: 05/15/2024 1005   Audit-C Score 0 Filed at: 05/15/2024 1005   DONNIE: How many times in the past year have you...    Used an illegal drug or used a prescription medication for non-medical reasons? Never Filed at: 05/15/2024 1005                      Medical Decision Making  69-year-old female history of breast cancer presenting with abdominal pain.  Left lower quadrant abdominal pain and tenderness with guarding with history of diverticulitis and feels similar.  Plan for labs including abdominal labs.  Symptom management with IV pain medication and fluids.  CT imaging.  Reassess.    Labs interpreted by me without significant acute process.  Patient with recurring pain after some initial improvement.  Given additional IV pain medication with some further improvement.  CT imaging notable for diverticulitis.  Patient still somewhat uncomfortable despite multiple IV pain medications.  Shared decision making with the patient given age and risk factors in setting of recurring pain with abdominal infection, patient opting for observation stay.  Plan for further evaluation and management inpatient.  Admitted.    Amount and/or Complexity of Data Reviewed  Labs: ordered.  Radiology:  ordered.    Risk  Prescription drug management.  Decision regarding hospitalization.             Disposition  Final diagnoses:   Left lower quadrant abdominal pain   Diverticulitis     Time reflects when diagnosis was documented in both MDM as applicable and the Disposition within this note       Time User Action Codes Description Comment    5/15/2024  9:39 AM Tae Marsh Add [R10.32] Left lower quadrant abdominal pain     5/15/2024 11:32 AM Erne, Tae Add [K57.92] Diverticulitis     5/15/2024 11:32 AM ErnTae burton Modify [R10.32] Left lower quadrant abdominal pain     5/15/2024 11:32 AM ErneTae Modify [K57.92] Diverticulitis           ED Disposition       ED Disposition   Admit    Condition   Stable    Date/Time   Wed May 15, 2024 9934    Comment   Case was discussed with [facility] and the patient's admission status was agreed to be Admission Status: observation status to the service of Dr. Martin .               Follow-up Information    None         Patient's Medications   Discharge Prescriptions    AMOXICILLIN-CLAVULANATE (AUGMENTIN) 875-125 MG PER TABLET    Take 1 tablet by mouth every 12 (twelve) hours for 7 days       Start Date: 5/15/2024 End Date: 5/22/2024       Order Dose: 1 tablet       Quantity: 14 tablet    Refills: 0    ONDANSETRON (ZOFRAN) 4 MG TABLET    Take 1 tablet (4 mg total) by mouth every 6 (six) hours as needed for nausea or vomiting       Start Date: 5/15/2024 End Date: --       Order Dose: 4 mg       Quantity: 12 tablet    Refills: 0       No discharge procedures on file.    PDMP Review         Value Time User    PDMP Reviewed  Yes 11/6/2023 11:49 AM Jose Camacho MD            ED Provider  Electronically Signed by             Tae Marsh MD  05/15/24 5241

## 2024-05-15 NOTE — H&P
[facility]  H&P  Name: Chelsy Alford 69 y.o. female I MRN: [id number]  Unit/Bed#: ED 17 I Date of Admission: 5/15/2024   Date of Service: 5/15/2024 I Hospital Day: 0      Assessment & Plan   * Acute diverticulitis  Assessment & Plan  C/c : 69-year-old lady with prior history of diverticulitis presenting to the ED with 2 to 3-day history of progressive left lower quadrant pain.  Denies any associated nausea vomiting.  In the ED patient received morphine/Toradol with persistent pain.  Being admitted as observation level of care.    CT notes evidence of diverticulitis in distal colon/proximal sigmoid  ED provider did try discharging patient on outpatient antibiotics however due to her significant pain [uncontrolled despite IV pain medications] patient being admitted as observation level of care  N.p.o./IV fluids  Started IV Cipro/Flagyl  As needed Tylenol/oxycodone/morphine ordered for pain control.    Personal history of malignant neoplasm of breast  Assessment & Plan  Prior history of CA breast s/p chemoradiation/lymph node dissection in 2003  Follows for outpatient surveillance with oncology    Hypertension, benign  Assessment & Plan  Confirmed home antihypertensives with patient  Resume home HCTZ/ARB/Lopressor/nifedipine.    Mixed hyperlipidemia  Assessment & Plan  On statin.    Iron deficiency anemia  Assessment & Plan  Resume home ferrous sulfate supplementation.       VTE Prophylaxis: Heparin  / sequential compression device   Code Status: level 1  POLST: POLST form is not discussed and not completed at this time.      Anticipated Length of Stay:  Patient will be admitted on an Observation basis with an anticipated length of stay of  less than 2 midnights.   Justification for Hospital Stay: iv abx    Total Time for Visit, including Counseling / Coordination of Care: 30 minutes.  Greater than 50% of this total time spent on direct patient counseling and coordination of care.    Chief  Complaint:   llq pain    History of Present Illness:    Chelsy Alford is a 69 y.o. female with underlying history of hypertension/hyperlipidemia/prior history of CA breath/iron deficiency anemia presenting to the ED with 2 to 3-day history of worsening left lower quadrant pain.  Denies any associated nausea vomiting.  Patient is afebrile.  She has had 1 prior episode of diverticulitis.  In the ED she received Unasyn/morphine/Toradol with persistent pain.  ED requesting admission as observation level of care.    Review of Systems:    Review of Systems   Constitutional:  Negative for chills and fever.   HENT:  Negative for ear pain and sore throat.    Eyes:  Negative for pain and visual disturbance.   Respiratory:  Negative for cough and shortness of breath.    Cardiovascular:  Negative for chest pain and palpitations.   Gastrointestinal:  Positive for abdominal pain. Negative for nausea and vomiting.   Genitourinary:  Negative for dysuria and hematuria.   Musculoskeletal:  Negative for arthralgias and back pain.   Skin:  Negative for color change and rash.   Neurological:  Negative for seizures and syncope.   All other systems reviewed and are negative.      Past Medical and Surgical History:     Past Medical History:   Diagnosis Date    Anemia     Arthritis     Bacterial pneumonia     last assessed: 2017    Borderline diabetes     Breast cancer (HCC) 2003    right    Depression     Diastolic dysfunction     GERD (gastroesophageal reflux disease)     High cholesterol     History of chemotherapy 2003    right breast ca    History of radiation therapy 2003    right breast ca    Hypertension     Insomnia     Palpitations        Past Surgical History:   Procedure Laterality Date    BREAST LUMPECTOMY Right 2003     SECTION      COLONOSCOPY      ESOPHAGOGASTRODUODENOSCOPY N/A 2019    Procedure: ESOPHAGOGASTRODUODENOSCOPY (EGD);  Surgeon: Poli Riojas III, MD;  Location: [facility];  Service:  Gastroenterology    JOINT REPLACEMENT Right     knee    KNEE SURGERY      AZ ARTHRP KNE CONDYLE&PLATU MEDIAL&LAT COMPARTMENTS Right 01/18/2016    Procedure: ARTHROPLASTY KNEE TOTAL;  Surgeon: Angela Noel MD;  Location:  MAIN OR;  Service: Orthopedics    AZ ARTHRP KNE CONDYLE&PLATU MEDIAL&LAT COMPARTMENTS Left 03/15/2023    Procedure: Left total knee arthroplasty;  Surgeon: Tony Cano DO;  Location: [facility];  Service: Orthopedics    AZ COLONOSCOPY FLX DX W/COLLJ SPEC WHEN PFRMD N/A 12/31/2018    Procedure: COLONOSCOPY;  Surgeon: Poli Riojas III, MD;  Location: [facility];  Service: Gastroenterology    AZ ESOPHAGOGASTRODUODENOSCOPY TRANSORAL DIAGNOSTIC N/A 12/31/2018    Procedure: ESOPHAGOGASTRODUODENOSCOPY (EGD);  Surgeon: Poli Riojas III, MD;  Location: [facility];  Service: Gastroenterology       Meds/Allergies:    Prior to Admission medications    Medication Sig Start Date End Date Taking? Authorizing Provider   amoxicillin-clavulanate (AUGMENTIN) 875-125 mg per tablet Take 1 tablet by mouth every 12 (twelve) hours for 7 days 5/15/24 5/22/24 Yes Tae Marsh MD   ondansetron (ZOFRAN) 4 mg tablet Take 1 tablet (4 mg total) by mouth every 6 (six) hours as needed for nausea or vomiting 5/15/24  Yes Tae Marsh MD   ferrous sulfate 325 (65 Fe) mg tablet TAKE 1 TABLET (325 MG TOTAL) BY MOUTH DAILY WITH BREAKFAST 3/14/24   Jose Camacho MD   hydroCHLOROthiazide 25 mg tablet Take 1 tablet (25 mg total) by mouth daily 4/15/24   Jose Camacho MD   metoprolol tartrate (LOPRESSOR) 50 mg tablet TAKE 1 TABLET (50 MG TOTAL) BY MOUTH 2 (TWO) TIMES A DAY 5/2/24   Jose Camacho MD   NIFEdipine ER (ADALAT CC) 30 MG 24 hr tablet Take 1 tablet (30 mg total) by mouth daily 3/13/24 9/9/24  Jose Camacho MD   pantoprazole (PROTONIX) 40 mg tablet TAKE ONE (1) TABLET BY MOUTH DAILY IN THE MORNING 2/19/24   Jose Camacho MD   potassium chloride (K-DUR,KLOR-CON) 10 mEq tablet TAKE ONE (1) TABLET BY MOUTH TWICE DAILY  1/4/24   Jose Camacho MD   sertraline (ZOLOFT) 50 mg tablet TAKE 1 TABLET (50 MG TOTAL) BY MOUTH DAILY 1/8/24   Jose Camacho MD   simvastatin (ZOCOR) 20 mg tablet Take 1 tablet (20 mg total) by mouth daily at bedtime 3/13/24   Jose Camacho MD   valsartan (DIOVAN) 320 MG tablet Take 1 tablet (320 mg total) by mouth daily 5/10/24   Jose Camacho MD   zolpidem (AMBIEN) 5 mg tablet Take 1 tablet (5 mg total) by mouth daily at bedtime as needed for sleep 11/6/23   Jose Camacho MD   acetaminophen (TYLENOL) 325 mg tablet Take 2 tablets (650 mg total) by mouth every 6 (six) hours 3/16/23 5/15/24  Jeanine Driscoll PA-C   ergocalciferol (ERGOCALCIFEROL) 1.25 MG (35352 UT) capsule Take 50,000 Units by mouth once a week  5/15/24  Historical Provider, MD   meloxicam (MOBIC) 7.5 mg tablet Take 1 tablet (7.5 mg total) by mouth daily as needed for moderate pain 3/28/24 5/15/24  Jose Camacho MD   RESTASIS 0.05 % ophthalmic emulsion Administer 1 drop to both eyes every 12 (twelve) hours 11/19/18 5/15/24  Historical Provider, MD LOCKHART have reviewed home medications using allscripts.    Allergies:   Allergies   Allergen Reactions    Ancef [Cefazolin] Hives     Possible hives during TKA surgery 3/15/2023.  See postop note.       Social History:     Marital Status: Single     Substance Use History:   Social History     Substance and Sexual Activity   Alcohol Use Never     Social History     Tobacco Use   Smoking Status Never   Smokeless Tobacco Never     Social History     Substance and Sexual Activity   Drug Use No       Family History:    non-contributory    Physical Exam:     Vitals:   Blood Pressure: 144/84 (05/15/24 1316)  Pulse: 59 (05/15/24 1316)  Temperature: 97.7 °F (36.5 °C) (05/15/24 0937)  Temp Source: Oral (05/15/24 0937)  Respirations: 18 (05/15/24 1158)  SpO2: 95 % (05/15/24 1158)    Physical Exam  Vitals reviewed.   Constitutional:       General: She is not in acute distress.     Appearance: She is not ill-appearing.    HENT:      Head: Normocephalic and atraumatic.      Nose: Nose normal. No congestion.      Mouth/Throat:      Mouth: Mucous membranes are moist.      Pharynx: Oropharynx is clear.   Eyes:      Conjunctiva/sclera: Conjunctivae normal.      Pupils: Pupils are equal, round, and reactive to light.   Cardiovascular:      Rate and Rhythm: Normal rate and regular rhythm.      Pulses: Normal pulses.   Pulmonary:      Effort: Pulmonary effort is normal. No respiratory distress.      Breath sounds: Normal breath sounds. No wheezing or rales.   Abdominal:      General: Abdomen is flat. Bowel sounds are normal. There is no distension.      Palpations: Abdomen is soft.      Tenderness: There is abdominal tenderness. There is no guarding or rebound.   Musculoskeletal:         General: No swelling. Normal range of motion.      Cervical back: Normal range of motion and neck supple.   Skin:     General: Skin is warm and dry.      Findings: No rash.   Neurological:      General: No focal deficit present.      Mental Status: She is alert and oriented to person, place, and time.   Psychiatric:         Mood and Affect: Mood normal.         Behavior: Behavior normal.         Additional Data:     Lab Results: I have personally reviewed pertinent reports.      Results from last 7 days   Lab Units 05/15/24  0952   WBC Thousand/uL 7.57   HEMOGLOBIN g/dL 12.3   HEMATOCRIT % 36.8   PLATELETS Thousands/uL 174   SEGS PCT % 72   LYMPHO PCT % 15   MONO PCT % 10   EOS PCT % 3     Results from last 7 days   Lab Units 05/15/24  0952   SODIUM mmol/L 138   POTASSIUM mmol/L 4.1   CHLORIDE mmol/L 107   CO2 mmol/L 25   BUN mg/dL 19   CREATININE mg/dL 0.84   ANION GAP mmol/L 6   CALCIUM mg/dL 9.5   ALBUMIN g/dL 4.0   TOTAL BILIRUBIN mg/dL 0.53   ALK PHOS U/L 86   ALT U/L 12   AST U/L 18   GLUCOSE RANDOM mg/dL 142*                       Imaging: I have personally reviewed pertinent reports.      CT abdomen pelvis with contrast   Final Result by Georgie  MD Rambo (05/15 1125)      Diverticulitis in distal descending colon/proximal sigmoid   No fluid collection   No free air   Consider colonoscopy with acute illness subsides to exclude any colonic lesion         Workstation performed: [id number]               Allscripts / Epic Records Reviewed: Yes     ** Please Note: This note has been constructed using a voice recognition system. **

## 2024-05-16 LAB
ANION GAP SERPL CALCULATED.3IONS-SCNC: 7 MMOL/L (ref 4–13)
BASOPHILS # BLD AUTO: 0.03 THOUSANDS/ÂΜL (ref 0–0.1)
BASOPHILS NFR BLD AUTO: 1 % (ref 0–1)
BUN SERPL-MCNC: 12 MG/DL (ref 5–25)
CALCIUM SERPL-MCNC: 8.7 MG/DL (ref 8.4–10.2)
CHLORIDE SERPL-SCNC: 108 MMOL/L (ref 96–108)
CO2 SERPL-SCNC: 25 MMOL/L (ref 21–32)
CREAT SERPL-MCNC: 0.69 MG/DL (ref 0.6–1.3)
EOSINOPHIL # BLD AUTO: 0.23 THOUSAND/ÂΜL (ref 0–0.61)
EOSINOPHIL NFR BLD AUTO: 5 % (ref 0–6)
ERYTHROCYTE [DISTWIDTH] IN BLOOD BY AUTOMATED COUNT: 14.3 % (ref 11.6–15.1)
GFR SERPL CREATININE-BSD FRML MDRD: 89 ML/MIN/1.73SQ M
GLUCOSE P FAST SERPL-MCNC: 90 MG/DL (ref 65–99)
GLUCOSE SERPL-MCNC: 90 MG/DL (ref 65–140)
HCT VFR BLD AUTO: 31.5 % (ref 34.8–46.1)
HGB BLD-MCNC: 10.7 G/DL (ref 11.5–15.4)
IMM GRANULOCYTES # BLD AUTO: 0.01 THOUSAND/UL (ref 0–0.2)
IMM GRANULOCYTES NFR BLD AUTO: 0 % (ref 0–2)
LYMPHOCYTES # BLD AUTO: 1.36 THOUSANDS/ÂΜL (ref 0.6–4.47)
LYMPHOCYTES NFR BLD AUTO: 30 % (ref 14–44)
MCH RBC QN AUTO: 30.3 PG (ref 26.8–34.3)
MCHC RBC AUTO-ENTMCNC: 34 G/DL (ref 31.4–37.4)
MCV RBC AUTO: 89 FL (ref 82–98)
MONOCYTES # BLD AUTO: 0.4 THOUSAND/ÂΜL (ref 0.17–1.22)
MONOCYTES NFR BLD AUTO: 9 % (ref 4–12)
NEUTROPHILS # BLD AUTO: 2.44 THOUSANDS/ÂΜL (ref 1.85–7.62)
NEUTS SEG NFR BLD AUTO: 55 % (ref 43–75)
NRBC BLD AUTO-RTO: 0 /100 WBCS
PLATELET # BLD AUTO: 168 THOUSANDS/UL (ref 149–390)
PMV BLD AUTO: 9.4 FL (ref 8.9–12.7)
POTASSIUM SERPL-SCNC: 3.7 MMOL/L (ref 3.5–5.3)
RBC # BLD AUTO: 3.53 MILLION/UL (ref 3.81–5.12)
SODIUM SERPL-SCNC: 140 MMOL/L (ref 135–147)
WBC # BLD AUTO: 4.47 THOUSAND/UL (ref 4.31–10.16)

## 2024-05-16 PROCEDURE — 99232 SBSQ HOSP IP/OBS MODERATE 35: CPT | Performed by: NURSE PRACTITIONER

## 2024-05-16 PROCEDURE — 85025 COMPLETE CBC W/AUTO DIFF WBC: CPT | Performed by: FAMILY MEDICINE

## 2024-05-16 PROCEDURE — 80048 BASIC METABOLIC PNL TOTAL CA: CPT | Performed by: FAMILY MEDICINE

## 2024-05-16 RX ADMIN — METOPROLOL TARTRATE 50 MG: 50 TABLET, FILM COATED ORAL at 08:28

## 2024-05-16 RX ADMIN — HEPARIN SODIUM 5000 UNITS: 5000 INJECTION INTRAVENOUS; SUBCUTANEOUS at 05:44

## 2024-05-16 RX ADMIN — ACETAMINOPHEN 650 MG: 325 TABLET, FILM COATED ORAL at 07:36

## 2024-05-16 RX ADMIN — CIPROFLOXACIN 400 MG: 400 INJECTION, SOLUTION INTRAVENOUS at 15:03

## 2024-05-16 RX ADMIN — METRONIDAZOLE 500 MG: 500 INJECTION, SOLUTION INTRAVENOUS at 07:36

## 2024-05-16 RX ADMIN — HEPARIN SODIUM 5000 UNITS: 5000 INJECTION INTRAVENOUS; SUBCUTANEOUS at 13:27

## 2024-05-16 RX ADMIN — METRONIDAZOLE 500 MG: 500 INJECTION, SOLUTION INTRAVENOUS at 13:27

## 2024-05-16 RX ADMIN — HYDROCHLOROTHIAZIDE 25 MG: 25 TABLET ORAL at 08:28

## 2024-05-16 RX ADMIN — SERTRALINE HYDROCHLORIDE 50 MG: 50 TABLET ORAL at 08:28

## 2024-05-16 RX ADMIN — LOSARTAN POTASSIUM 100 MG: 50 TABLET, FILM COATED ORAL at 08:28

## 2024-05-16 RX ADMIN — METRONIDAZOLE 500 MG: 500 INJECTION, SOLUTION INTRAVENOUS at 21:42

## 2024-05-16 RX ADMIN — PRAVASTATIN SODIUM 40 MG: 40 TABLET ORAL at 16:46

## 2024-05-16 RX ADMIN — FERROUS SULFATE TAB 325 MG (65 MG ELEMENTAL FE) 325 MG: 325 (65 FE) TAB at 07:36

## 2024-05-16 RX ADMIN — NIFEDIPINE 30 MG: 30 TABLET, FILM COATED, EXTENDED RELEASE ORAL at 08:28

## 2024-05-16 RX ADMIN — PANTOPRAZOLE SODIUM 40 MG: 40 TABLET, DELAYED RELEASE ORAL at 05:44

## 2024-05-16 RX ADMIN — METOPROLOL TARTRATE 50 MG: 50 TABLET, FILM COATED ORAL at 17:46

## 2024-05-16 RX ADMIN — HEPARIN SODIUM 5000 UNITS: 5000 INJECTION INTRAVENOUS; SUBCUTANEOUS at 21:42

## 2024-05-16 RX ADMIN — CIPROFLOXACIN 400 MG: 400 INJECTION, SOLUTION INTRAVENOUS at 05:44

## 2024-05-16 NOTE — UTILIZATION REVIEW
Initial Clinical Review      OBS order 5/15 1244 converted to IP on 5/16 1343 for continued mngt of acute diverticulitis     Admission: Date/Time/Statement:   Admission Orders (From admission, onward)       Ordered        05/16/24 1343  INPATIENT ADMISSION  Once            05/15/24 1244  Place in Observation  Once                           INPATIENT ADMISSION     Standing Status:   Standing     Number of Occurrences:   1     Order Specific Question:   Level of Care     Answer:   Med Surg [16]     Order Specific Question:   Estimated length of stay     Answer:   More than 2 Midnights     Order Specific Question:   Certification     Answer:   I certify that inpatient services are medically necessary for this patient for a duration of greater than two midnights. See H&P and MD Progress Notes for additional information about the patient's course of treatment.     ED Arrival Information       Expected   -    Arrival   5/15/2024 08:30    Acuity   Urgent              Means of arrival   Walk-In    Escorted by   Self    Service   Hospitalist    Admission type   Emergency              Arrival complaint   ABDOMINAL PAIN             Chief Complaint   Patient presents with    Abdominal Pain     LLQ abd pain for the past few days. Hx of diverticulitis, but unsure of this is the same. Denies N/V       Initial Presentation: 69 y.o. female to ED from home w/ PMHX hypertension/hyperlipidemia/prior history of CA breath/iron deficiency anemia presenting to the ED with 2 to 3-day history of worsening left lower quadrant pain. Denies any associated nausea vomiting. Patient is afebrile. She has had 1 prior episode of diverticulitis. In the ED she received Unasyn/morphine/Toradol with persistent pain. CT notes evidence of diverticulitis in distal colon/proximal sigmoid Admitted OBS status w/ acute diverticulitis plan for NPO , IVF , IV cipro and flagyl , pain control . HTN Resume home HCTZ/ARB/Lopressor/nifedipine . HLD statin .      Anticipated Length of Stay/Certification Statement: Patient will be admitted on an Observation basis with an anticipated length of stay of  less than 2 midnights.   Justification for Hospital Stay: iv abx     Date: 5/16   Day 2: tolerating soft diet . Cont iv cipro / flagyl . Pt is anxious . Labs stable . Cont IVF . IV morphine prn for pain control .     Date: 5/17  Day 3: Has surpassed a 2nd midnight with active treatments and services.   DC to home         ED Triage Vitals   Temperature Pulse Respirations Blood Pressure SpO2   05/15/24 0837 05/15/24 0837 05/15/24 0837 05/15/24 0837 05/15/24 0837   98.1 °F (36.7 °C) 90 18 142/88 98 %      Temp Source Heart Rate Source Patient Position - Orthostatic VS BP Location FiO2 (%)   05/15/24 0837 05/15/24 0837 05/15/24 0837 05/15/24 0837 --   Oral Monitor Sitting Left arm       Pain Score       05/15/24 0945       10 - Worst Possible Pain          Wt Readings from Last 1 Encounters:   05/16/24 83.8 kg (184 lb 11.9 oz)     Additional Vital Signs:              05/17/24 07:05:48 97.9 °F (36.6 °C) 66 18 135/84 101 91 % -- --   05/16/24 21:41:43 98.4 °F (36.9 °C) 73 16 137/81 100 92 % -- Lying   05/16/24 1900 98.1 °F (36.7 °C) -- 18 142/88 -- -- -- --   05/16/24 15:15:39 97.9 °F (36.6 °C) 77 18 137/80 99 95 % -- --   05/16/24 08:27:29 98 °F (36.7 °C) 70 -- 151/85 107 93 % -- --   05/16/24 0825 98 °F (36.7 °C) -- -- -- -- 92 % --        05/15/24 21:56:59 98 °F (36.7 °C) 59 16 123/75 91 91 % -- Lying   05/15/24 1500 -- -- -- -- -- 97 % None (Room air) --   05/15/24 14:58:19 97.6 °F (36.4 °C) 63 16 122/75 91 96 % -- --   05/15/24 1316 -- 59 -- 144/84 -- -- -- --   05/15/24 1300 -- 66 16 144/84 110 93 % None (Room air) Lying   05/15/24 1158 -- 60 18 157/93 117 95 % None (Room air) Sitting   05/15/24 1000 -- 76 16 165/96 124 95 % None (Room air) Sitting   05/15/24 0937 97.7 °F (36.5 °C) 78 -- 169/88 -- 97 % None (Room air) Lying     Pertinent Labs/Diagnostic Test Results:   CT  abdomen pelvis with contrast   Final Result by Georgie Ricks MD (05/15 1125)      Diverticulitis in distal descending colon/proximal sigmoid   No fluid collection   No free air   Consider colonoscopy with acute illness subsides to exclude any colonic lesion         Workstation performed: [id number]               Results from last 7 days   Lab Units 05/17/24 0528 05/16/24  0516 05/15/24  0952   WBC Thousand/uL 3.82* 4.47 7.57   HEMOGLOBIN g/dL 11.0* 10.7* 12.3   HEMATOCRIT % 32.7* 31.5* 36.8   PLATELETS Thousands/uL 182 168 174   TOTAL NEUT ABS Thousands/µL  --  2.44 5.42         Results from last 7 days   Lab Units 05/17/24 0528 05/16/24  0516 05/15/24  0952 05/13/24  1259   SODIUM mmol/L 138 140 138 138   POTASSIUM mmol/L 3.8 3.7 4.1 4.6   CHLORIDE mmol/L 107 108 107 104   CO2 mmol/L 24 25 25 26   ANION GAP mmol/L 7 7 6 8   BUN mg/dL 15 12 19 32*   CREATININE mg/dL 0.83 0.69 0.84 0.88   EGFR ml/min/1.73sq m 72 89 71 67   CALCIUM mg/dL 9.1 8.7 9.5 9.1   MAGNESIUM mg/dL 1.9  --   --   --      Results from last 7 days   Lab Units 05/15/24  0952 05/13/24  1259   AST U/L 18 21   ALT U/L 12 12   ALK PHOS U/L 86 69   TOTAL PROTEIN g/dL 7.5 6.7   ALBUMIN g/dL 4.0 4.1   TOTAL BILIRUBIN mg/dL 0.53 0.56         Results from last 7 days   Lab Units 05/17/24 0528 05/16/24  0516 05/15/24  0952   GLUCOSE RANDOM mg/dL 124 90 142*     Results from last 7 days   Lab Units 05/15/24  0952   LIPASE u/L 15       Results from last 7 days   Lab Units 05/15/24  1104   CLARITY UA  Clear   COLOR UA  Colorless   SPEC GRAV UA  1.025   PH UA  5.5   GLUCOSE UA mg/dl Negative   KETONES UA mg/dl Negative   BLOOD UA  Negative   PROTEIN UA mg/dl Negative   NITRITE UA  Negative   BILIRUBIN UA  Negative   UROBILINOGEN UA (BE) mg/dl <2.0   LEUKOCYTES UA  Negative         ED Treatment:   Medication Administration from 05/15/2024 0830 to 05/15/2024 1451         Date/Time Order Dose Route Action     05/15/2024 0900 EDT sodium chloride 0.9 % bolus  1,000 mL 1,000 mL Intravenous New Bag     05/15/2024 0956 EDT ketorolac (TORADOL) injection 15 mg 15 mg Intravenous Given     05/15/2024 0956 EDT morphine injection 4 mg 4 mg Intravenous Given     05/15/2024 1236 EDT ampicillin-sulbactam (UNASYN) 3 g in sodium chloride 0.9 % 100 mL IVPB 3 g Intravenous New Bag     05/15/2024 1236 EDT morphine injection 4 mg 4 mg Intravenous Given     05/15/2024 1349 EDT pantoprazole (PROTONIX) EC tablet 40 mg 40 mg Oral Given     05/15/2024 1350 EDT multi-electrolyte (PLASMALYTE-A/ISOLYTE-S PH 7.4) IV solution 75 mL/hr Intravenous New Bag     05/15/2024 1350 EDT heparin (porcine) subcutaneous injection 5,000 Units 5,000 Units Subcutaneous Given     05/15/2024 1350 EDT metroNIDAZOLE (FLAGYL) IVPB (premix) 500 mg 100 mL 500 mg Intravenous New Bag          Past Medical History:   Diagnosis Date    Anemia     Arthritis     Bacterial pneumonia     last assessed: 05/16/2017    Borderline diabetes     Breast cancer (HCC) 2003    right    Depression     Diastolic dysfunction     GERD (gastroesophageal reflux disease)     High cholesterol     History of chemotherapy 2003    right breast ca    History of radiation therapy 2003    right breast ca    Hypertension     Insomnia     Palpitations      Present on Admission:   Mixed hyperlipidemia   Hypertension, benign   Iron deficiency anemia      Admitting Diagnosis: Diverticulitis [K57.92]  Abdominal pain [R10.9]  Left lower quadrant abdominal pain [R10.32]  Age/Sex: 69 y.o. female  Admission Orders:  Scheduled Medications:  ciprofloxacin, 400 mg, Intravenous, Q12H  ferrous sulfate, 325 mg, Oral, Daily With Breakfast  heparin (porcine), 5,000 Units, Subcutaneous, Q8H IGNACIA  hydroCHLOROthiazide, 25 mg, Oral, Daily  losartan, 100 mg, Oral, Daily  metoprolol tartrate, 50 mg, Oral, BID  metroNIDAZOLE, 500 mg, Intravenous, Q8H  NIFEdipine, 30 mg, Oral, Daily  pantoprazole, 40 mg, Oral, Early Morning  pravastatin, 40 mg, Oral, Daily With  Dinner  sertraline, 50 mg, Oral, Daily      Continuous IV Infusions:  multi-electrolyte, 75 mL/hr, Intravenous, Continuous      PRN Meds:  acetaminophen, 650 mg, Oral, Q6H PRN  morphine injection, 2 mg, Intravenous, Q6H PRN  5/15 x1  ondansetron, 4 mg, Intravenous, Q6H PRN  oxyCODONE, 5 mg, Oral, Q6H PRN    I&O   Up and OOB   NPO         Network Utilization Review Department  ATTENTION: Please call with any questions or concerns to [phone] and carefully listen to the prompts so that you are directed to the right person. All voicemails are confidential.   For Discharge needs, contact Care Management DC Support Team at [phone] opt. 2  Send all requests for admission clinical reviews, approved or denied determinations and any other requests to dedicated fax number below belonging to the [facility] where the patient is receiving treatment. List of dedicated fax numbers for the Facilities:  FACILITY NAME UR FAX NUMBER   ADMISSION DENIALS (Administrative/Medical Necessity) [phone]   DISCHARGE SUPPORT TEAM (NETWORK) [phone]   PARENT CHILD HEALTH (Maternity/NICU/Pediatrics) [phone]   [facility] [phone]   [facility] [phone]   [facility] [phone]   [facility] [phone]   [facility] [phone]   [facility] [phone]   [facility] [phone]   [facility] [phone]   [facility] [phone]   Pending sale to [facility] [phone]   [facility] [phone]   [facility] [phone]

## 2024-05-16 NOTE — PROGRESS NOTES
[facility]  Progress Note  Name: Chelsy Alford I  MRN: [id number]  Unit/Bed#: -01 I Date of Admission: 5/15/2024   Date of Service: 5/16/2024 I Hospital Day: 0    Assessment & Plan   Personal history of malignant neoplasm of breast  Assessment & Plan  History of stage 2 right breast CA  S/p lumpectomy and chemotherapy  Follows with oncology for OP surveillance    Iron deficiency anemia  Assessment & Plan  Noted  Continue supplementation    Hypertension, benign  Assessment & Plan  Chronic, vital signs stable, 151/85  Continue home medication regimen  Monitor vital signs once per shift    Mixed hyperlipidemia  Assessment & Plan  Continue Pravachol    * Acute diverticulitis  Assessment & Plan  Patient presented to the ED with complaints of 2-3 day history of left lower quadrant pain.  CT Abd/Pelvis (5/15/24): Diverticulitis in distal descending colon/proximal sigmoid. No fluid collection. No free air. Consider colonoscopy with acute illness subsides to exclude any colonic lesion  Continue with IV Cirpo/Flagyl, will plan to transition to Omnicef/Flagyl on discharge x 10 days  Labs stable, patient tolerating soft diet without complication           VTE Pharmacologic Prophylaxis: VTE Score: 4 Moderate Risk (Score 3-4) - Pharmacological DVT Prophylaxis Ordered: heparin.    Mobility:   Basic Mobility Inpatient Raw Score: 24  JH-HLM Goal: 8: Walk 250 feet or more  JH-HLM Achieved: 7: Walk 25 feet or more  JH-HLM Goal achieved. Continue to encourage appropriate mobility.    Patient Centered Rounds: I performed bedside rounds with nursing staff today.   Discussions with Specialists or Other Care Team Provider: Case Management    Education and Discussions with Family / Patient: Patient declined call to .     Total Time Spent on Date of Encounter in care of patient: 35 mins. This time was spent on one or more of the following: performing physical exam; counseling and coordination  of care; obtaining or reviewing history; documenting in the medical record; reviewing/ordering tests, medications or procedures; communicating with other healthcare professionals and discussing with patient's family/caregivers.    Current Length of Stay: 0 day(s)  Current Patient Status: Observation   Certification Statement: The patient will continue to require additional inpatient hospital stay due to ongoing treatment in setting of acute diverticulitis, need for iv abx  Discharge Plan: Anticipate discharge tomorrow to home.    Code Status: Level 1 - Full Code    Subjective:   Patient resting in bed, anxious but reportedly feeling somewhat better.  No complaints of chest pain, shortness of breath, fever or chills.    Objective:     Vitals:   Temp (24hrs), Av.9 °F (36.6 °C), Min:97.6 °F (36.4 °C), Max:98 °F (36.7 °C)    Temp:  [97.6 °F (36.4 °C)-98 °F (36.7 °C)] 98 °F (36.7 °C)  HR:  [59-70] 70  Resp:  [16] 16  BP: (122-151)/(75-85) 151/85  SpO2:  [91 %-97 %] 93 %  Body mass index is 33.79 kg/m².     Input and Output Summary (last 24 hours):     Intake/Output Summary (Last 24 hours) at 2024 1342  Last data filed at 2024 1200  Gross per 24 hour   Intake 921.25 ml   Output --   Net 921.25 ml       Physical Exam:   Physical Exam  Vitals and nursing note reviewed.   Constitutional:       General: She is not in acute distress.     Appearance: She is normal weight. She is ill-appearing.   Cardiovascular:      Rate and Rhythm: Normal rate.      Pulses: Normal pulses.   Pulmonary:      Effort: Pulmonary effort is normal.   Abdominal:      General: Bowel sounds are normal. There is no distension.      Palpations: Abdomen is soft.      Tenderness: There is no abdominal tenderness.   Musculoskeletal:         General: Normal range of motion.      Right lower leg: No edema.      Left lower leg: No edema.   Skin:     General: Skin is warm and dry.   Neurological:      Mental Status: She is alert and oriented to  person, place, and time.   Psychiatric:         Mood and Affect: Mood is anxious.          Additional Data:     Labs:  Results from last 7 days   Lab Units 05/16/24  0516   WBC Thousand/uL 4.47   HEMOGLOBIN g/dL 10.7*   HEMATOCRIT % 31.5*   PLATELETS Thousands/uL 168   SEGS PCT % 55   LYMPHO PCT % 30   MONO PCT % 9   EOS PCT % 5     Results from last 7 days   Lab Units 05/16/24  0516 05/15/24  0952   SODIUM mmol/L 140 138   POTASSIUM mmol/L 3.7 4.1   CHLORIDE mmol/L 108 107   CO2 mmol/L 25 25   BUN mg/dL 12 19   CREATININE mg/dL 0.69 0.84   ANION GAP mmol/L 7 6   CALCIUM mg/dL 8.7 9.5   ALBUMIN g/dL  --  4.0   TOTAL BILIRUBIN mg/dL  --  0.53   ALK PHOS U/L  --  86   ALT U/L  --  12   AST U/L  --  18   GLUCOSE RANDOM mg/dL 90 142*                       Lines/Drains:  Invasive Devices       Peripheral Intravenous Line  Duration             Peripheral IV 05/15/24 Left Antecubital 1 day                    Imaging: No pertinent imaging reviewed.    Recent Cultures (last 7 days):         Last 24 Hours Medication List:   Current Facility-Administered Medications   Medication Dose Route Frequency Provider Last Rate    acetaminophen  650 mg Oral Q6H PRN Doris Martin MD      ciprofloxacin  400 mg Intravenous Q12H Doris Martin  mg (05/16/24 0544)    ferrous sulfate  325 mg Oral Daily With Breakfast Doris Martin MD      heparin (porcine)  5,000 Units Subcutaneous Q8H [facility] Doris Martin MD      hydroCHLOROthiazide  25 mg Oral Daily Doris Martin MD      losartan  100 mg Oral Daily Doris Martin MD      metoprolol tartrate  50 mg Oral BID Doris Martin MD      metroNIDAZOLE  500 mg Intravenous Q8H Doris Martin  mg (05/16/24 1327)    morphine injection  2 mg Intravenous Q6H PRN Doris Martin MD      multi-electrolyte  75 mL/hr Intravenous Continuous Doris Martin MD 75 mL/hr (05/15/24 5839)    NIFEdipine  30 mg Oral Daily Doris Martin MD      ondansetron  4 mg Intravenous Q6H PRN Doris Martin MD       oxyCODONE  5 mg Oral Q6H PRN Doris Martin MD      pantoprazole  40 mg Oral Early Morning Doris Martin MD      pravastatin  40 mg Oral Daily With Dinner Doris Martin MD      sertraline  50 mg Oral Daily Doris Martin MD          Today, Patient Was Seen By: CAROL Murguia    **Please Note: This note may have been constructed using a voice recognition system.**

## 2024-05-16 NOTE — CASE MANAGEMENT
Case Management Assessment & Discharge Planning Note    Patient name Chelsy Alford  Location /-01 MRN [id number]  : 1955 Date 2024       Current Admission Date: 5/15/2024  Current Admission Diagnosis:Acute diverticulitis   Patient Active Problem List    Diagnosis Date Noted Date Diagnosed    Acute diverticulitis 05/15/2024     Mild episode of recurrent major depressive disorder (HCC) 2022     Personal history of malignant neoplasm of breast 2019     Gastroesophageal reflux disease 01/15/2019     Iron deficiency anemia 2018     Unintentional weight loss 2018     Murmur, heart 2018     Callus of foot 03/15/2018     Bunion of great toe of left foot 03/15/2018     Primary osteoarthritis of left knee 2016     Diastolic dysfunction 2015     Impaired fasting glucose 2015     Chronic insomnia 2014     Mixed hyperlipidemia 2014     Hypertension, benign 2014       LOS (days): 0  Geometric Mean LOS (GMLOS) (days):   Days to GMLOS:     OBJECTIVE:              Current admission status: Observation  Referral Reason: Other (D/C planning)    Preferred Pharmacy:   [facility] [address] [place] [address] [facility]  [address] [address]  Phone: [phone] Fax: [phone]    Primary Care Provider: Jose Camacho MD    Primary Insurance: [employer]  Secondary Insurance: [facility]    ASSESSMENT:  Active Health Care Proxies    There are no active Health Care Proxies on file.       Advance Directives  Does patient have a Health Care POA?: No  Was patient offered paperwork?: Yes  Does patient currently have a Health Care decision maker?: No  Does patient have Advance Directives?: No  Was patient offered paperwork?: Yes  Primary Contact: Miroslava Sanchez         Readmission Root Cause  30 Day Readmission: No    Patient Information  Admitted from::  Home  Mental Status: Alert  During Assessment patient was accompanied by: Not accompanied during assessment  Assessment information provided by:: Patient  Primary Caregiver: Self  Support Systems: None  County of Residence: [place]  What city do you live in?: [place]  Home entry access options. Select all that apply.: Elevator  Type of Current Residence: Apartment  Floor Level: 3  Upon entering residence, is there a bedroom on the main floor (no further steps)?: Yes  Upon entering residence, is there a bathroom on the main floor (no further steps)?: Yes  Living Arrangements: Lives Alone  Is patient a ?: No    Activities of Daily Living Prior to Admission  Functional Status: Independent  Completes ADLs independently?: Yes  Ambulates independently?: Yes  Does patient use assisted devices?: No  Does patient currently own DME?: No  Does patient have a history of Outpatient Therapy (PT/OT)?: No  Does the patient have a history of Short-Term Rehab?: No  Does patient have a history of HHC?: No  Does patient currently have HHC?: No         Patient Information Continued  Income Source: Pension/group home  Does patient have prescription coverage?: Yes  Does patient receive dialysis treatments?: No  Does patient have a history of substance abuse?: No  Does patient have a history of Mental Health Diagnosis?: No         Means of Transportation  Means of Transport to Appts:: Drives Self      Social Determinants of Health (SDOH)      Flowsheet Row Most Recent Value   Housing Stability    In the last 12 months, was there a time when you were not able to pay the mortgage or rent on time? N   In the past 12 months, how many times have you moved where you were living? 1   At any time in the past 12 months, were you homeless or living in a shelter (including now)? N   Transportation Needs    In the past 12 months, has lack of transportation kept you from medical appointments or from getting medications? no   In the past 12  months, has lack of transportation kept you from meetings, work, or from getting things needed for daily living? No   Food Insecurity    Within the past 12 months, you worried that your food would run out before you got the money to buy more. Never true   Within the past 12 months, the food you bought just didn't last and you didn't have money to get more. Never true   Utilities    In the past 12 months has the electric, gas, oil, or water company threatened to shut off services in your home? No            DISCHARGE DETAILS:    Discharge planning discussed with:: Pt  Freedom of Choice: Yes        Were Treatment Team discharge recommendations reviewed with patient/caregiver?: Yes  Did patient/caregiver verbalize understanding of patient care needs?: Yes  Were patient/caregiver advised of the risks associated with not following Treatment Team discharge recommendations?: Yes         Requested Home Health Care         Is the patient interested in HHC at discharge?: No    DME Referral Provided  Referral made for DME?: No    Other Referral/Resources/Interventions Provided:  Interventions: Advanced Directives  Referral Comments: CM met with pt at bedside. CM provided pt with AD paperwork. Pt reported no additional CM needs at this time. CM will continue to follow.    Would you like to participate in our [facility] service program?  : No - Declined    Treatment Team Recommendation: Home  Discharge Destination Plan:: Home  Transport at Discharge : Self

## 2024-05-16 NOTE — ASSESSMENT & PLAN NOTE
History of stage 2 right breast CA  S/p lumpectomy and chemotherapy  Follows with oncology for OP surveillance

## 2024-05-16 NOTE — ASSESSMENT & PLAN NOTE
Patient presented to the ED with complaints of 2-3 day history of left lower quadrant pain.  CT Abd/Pelvis (5/15/24): Diverticulitis in distal descending colon/proximal sigmoid. No fluid collection. No free air. Consider colonoscopy with acute illness subsides to exclude any colonic lesion  Continue with IV Cirpo/Flagyl, will plan to transition to Omnicef/Flagyl on discharge x 10 days  Labs stable, patient tolerating soft diet without complication

## 2024-05-16 NOTE — ASSESSMENT & PLAN NOTE
Chronic, vital signs stable, 151/85  Continue home medication regimen  Monitor vital signs once per shift

## 2024-05-17 ENCOUNTER — TRANSITIONAL CARE MANAGEMENT (OUTPATIENT)
Dept: INTERNAL MEDICINE CLINIC | Facility: CLINIC | Age: 69
End: 2024-05-17

## 2024-05-17 VITALS
DIASTOLIC BLOOD PRESSURE: 84 MMHG | HEART RATE: 66 BPM | TEMPERATURE: 97.9 F | SYSTOLIC BLOOD PRESSURE: 135 MMHG | RESPIRATION RATE: 18 BRPM | HEIGHT: 63 IN | OXYGEN SATURATION: 91 % | BODY MASS INDEX: 32.73 KG/M2 | WEIGHT: 184.75 LBS

## 2024-05-17 LAB
ANION GAP SERPL CALCULATED.3IONS-SCNC: 7 MMOL/L (ref 4–13)
BUN SERPL-MCNC: 15 MG/DL (ref 5–25)
CALCIUM SERPL-MCNC: 9.1 MG/DL (ref 8.4–10.2)
CHLORIDE SERPL-SCNC: 107 MMOL/L (ref 96–108)
CO2 SERPL-SCNC: 24 MMOL/L (ref 21–32)
CREAT SERPL-MCNC: 0.83 MG/DL (ref 0.6–1.3)
ERYTHROCYTE [DISTWIDTH] IN BLOOD BY AUTOMATED COUNT: 13.9 % (ref 11.6–15.1)
GFR SERPL CREATININE-BSD FRML MDRD: 72 ML/MIN/1.73SQ M
GLUCOSE SERPL-MCNC: 124 MG/DL (ref 65–140)
HCT VFR BLD AUTO: 32.7 % (ref 34.8–46.1)
HGB BLD-MCNC: 11 G/DL (ref 11.5–15.4)
MAGNESIUM SERPL-MCNC: 1.9 MG/DL (ref 1.9–2.7)
MCH RBC QN AUTO: 30.1 PG (ref 26.8–34.3)
MCHC RBC AUTO-ENTMCNC: 33.6 G/DL (ref 31.4–37.4)
MCV RBC AUTO: 89 FL (ref 82–98)
PLATELET # BLD AUTO: 182 THOUSANDS/UL (ref 149–390)
PMV BLD AUTO: 9.2 FL (ref 8.9–12.7)
POTASSIUM SERPL-SCNC: 3.8 MMOL/L (ref 3.5–5.3)
RBC # BLD AUTO: 3.66 MILLION/UL (ref 3.81–5.12)
SODIUM SERPL-SCNC: 138 MMOL/L (ref 135–147)
WBC # BLD AUTO: 3.82 THOUSAND/UL (ref 4.31–10.16)

## 2024-05-17 PROCEDURE — 80048 BASIC METABOLIC PNL TOTAL CA: CPT | Performed by: NURSE PRACTITIONER

## 2024-05-17 PROCEDURE — 85027 COMPLETE CBC AUTOMATED: CPT | Performed by: NURSE PRACTITIONER

## 2024-05-17 PROCEDURE — 83735 ASSAY OF MAGNESIUM: CPT | Performed by: NURSE PRACTITIONER

## 2024-05-17 PROCEDURE — 99239 HOSP IP/OBS DSCHRG MGMT >30: CPT | Performed by: NURSE PRACTITIONER

## 2024-05-17 RX ADMIN — HYDROCHLOROTHIAZIDE 25 MG: 25 TABLET ORAL at 10:33

## 2024-05-17 RX ADMIN — PANTOPRAZOLE SODIUM 40 MG: 40 TABLET, DELAYED RELEASE ORAL at 05:36

## 2024-05-17 RX ADMIN — NIFEDIPINE 30 MG: 30 TABLET, FILM COATED, EXTENDED RELEASE ORAL at 10:33

## 2024-05-17 RX ADMIN — CIPROFLOXACIN 400 MG: 400 INJECTION, SOLUTION INTRAVENOUS at 02:24

## 2024-05-17 RX ADMIN — LOSARTAN POTASSIUM 100 MG: 50 TABLET, FILM COATED ORAL at 10:32

## 2024-05-17 RX ADMIN — FERROUS SULFATE TAB 325 MG (65 MG ELEMENTAL FE) 325 MG: 325 (65 FE) TAB at 10:32

## 2024-05-17 RX ADMIN — HEPARIN SODIUM 5000 UNITS: 5000 INJECTION INTRAVENOUS; SUBCUTANEOUS at 05:36

## 2024-05-17 RX ADMIN — METOPROLOL TARTRATE 50 MG: 50 TABLET, FILM COATED ORAL at 10:34

## 2024-05-17 RX ADMIN — METRONIDAZOLE 500 MG: 500 INJECTION, SOLUTION INTRAVENOUS at 05:36

## 2024-05-17 RX ADMIN — SERTRALINE HYDROCHLORIDE 50 MG: 50 TABLET ORAL at 10:34

## 2024-05-17 RX ADMIN — SODIUM CHLORIDE, SODIUM GLUCONATE, SODIUM ACETATE, POTASSIUM CHLORIDE, MAGNESIUM CHLORIDE, SODIUM PHOSPHATE, DIBASIC, AND POTASSIUM PHOSPHATE 75 ML/HR: .53; .5; .37; .037; .03; .012; .00082 INJECTION, SOLUTION INTRAVENOUS at 06:30

## 2024-05-17 NOTE — UTILIZATION REVIEW
NOTIFICATION OF INPATIENT ADMISSION   AUTHORIZATION REQUEST   SERVICING FACILITY:   [place] [address]  Tax ID: [id number]  NPI: [id number] ATTENDING PROVIDER:  Attending Name and NPI#: Maddi ZhougaleDo [[id number]]  Address: [address] [place] [address]  Phone: [phone]     ADMISSION INFORMATION:  Place of Service: Inpatient [facility]  Place of Service Code: 21  Inpatient Admission Date/Time: 5/16/24  1:43 PM  Discharge Date/Time: No discharge date for patient encounter.  Admitting Diagnosis Code/Description:  Diverticulitis [K57.92]  Abdominal pain [R10.9]  Left lower quadrant abdominal pain [R10.32]     UTILIZATION REVIEW CONTACT:  Adele Ellis Utilization   Network Utilization Review Department  Phone: [phone]  Fax [phone]  Email: [email or web address]  Contact for approvals/pending authorizations, clinical reviews, and discharge.     PHYSICIAN ADVISORY SERVICES:  Medical Necessity Denial & [employer] Review  Phone: [phone]  Fax: [phone]  Email: [email or web address]     DISCHARGE SUPPORT TEAM:  For Patients Discharge Needs & Updates  Phone: [phone] opt. 2 Fax: [phone]  Email: [email or web address]

## 2024-05-17 NOTE — ASSESSMENT & PLAN NOTE
Patient presented to the ED with complaints of 2-3 day history of left lower quadrant pain.  CT Abd/Pelvis (5/15/24): Diverticulitis in distal descending colon/proximal sigmoid. No fluid collection. No free air. Consider colonoscopy with acute illness subsides to exclude any colonic lesion  Continue with IV Cirpo/Flagyl, will plan to transition to Cipro/Flagyl on discharge x 10 days  Labs stable, patient tolerating soft diet without complication

## 2024-05-17 NOTE — DISCHARGE INSTRUCTIONS
Diverticulitis Diet   WHAT YOU NEED TO KNOW:   A diverticulitis diet includes foods that allow your intestines to rest while you have diverticulitis. Diverticulitis is a condition that causes small pockets along your intestine called diverticula to become inflamed or infected. This is caused by hard bowel movement, food, or bacteria that get stuck in the pockets.        DISCHARGE INSTRUCTIONS:   Foods that may be recommended while you have diverticulitis:   A clear liquid diet may be recommended for 2 to 3 days.  A clear liquid diet includes clear liquids, and foods that are liquid at room temperature. Examples include the following:     Water and clear juices (such as apple, cranberry, or grape), strained citrus juices or fruit punch    Coffee or tea (without cream or milk)    Clear sports drinks or soft drinks, such as ginger ale, lemon-lime soda, or club soda (no cola or root beer)    Clear broth, bouillon, or consommé    Plain popsicles (no popsicles with pureed fruit or fiber)    Flavored gelatin without fruit    Low-fiber foods may be recommended until your symptoms improve.  Examples include the following:     Cream of wheat and finely ground grits    White bread, white pasta, and white rice    Canned and well-cooked fruit without skins or seeds, and juice without pulp    Canned and well-cooked vegetables without skins or seeds, and vegetable juice    Cow's milk, lactose-free milk, soy milk, and rice milk    Yogurt, cottage cheese, and sherbet    Eggs, poultry (such as chicken and turkey), fish, and tender, ground, well-cooked beef     Tofu and smooth nut butters, such as peanut butter    Broth and strained soups made of low-fiber foods    High-fiber foods  can help prevent diverticulosis and diverticulitis. Your healthcare provider will tell you when you can add high-fiber foods back into your diet. Examples include the following:  Whole grains and breads, and cereals made with whole grains    Dried fruit,  fresh fruit with skin, and fruit pulp    Raw vegetables    Cooked greens, such as spinach    Tough meat and meat with gristle    Legumes, such as fountain beans and lentils       Call your doctor or dietitian if:   Your symptoms do not get better, or they get worse.    You have questions about the foods you should eat.    You have questions or concerns about your condition or care.    © Copyright [employer] 2023 Information is for End User's use only and may not be sold, redistributed or otherwise used for commercial purposes.  The above information is an  only. It is not intended as medical advice for individual conditions or treatments. Talk to your doctor, nurse or pharmacist before following any medical regimen to see if it is safe and effective for you.

## 2024-05-17 NOTE — DISCHARGE SUMMARY
[facility]  Discharge- Chelsy Alford 1955, 69 y.o. female MRN: [id number]  Unit/Bed#: MS August-01 Encounter: [id number]  Primary Care Provider: Jose Camacho MD   Date and time admitted to hospital: 5/15/2024  9:30 AM    * Acute diverticulitis  Assessment & Plan  Patient presented to the ED with complaints of 2-3 day history of left lower quadrant pain.  CT Abd/Pelvis (5/15/24): Diverticulitis in distal descending colon/proximal sigmoid. No fluid collection. No free air. Consider colonoscopy with acute illness subsides to exclude any colonic lesion  Continue with IV Cirpo/Flagyl, will plan to transition to Cipro/Flagyl on discharge x 10 days  Labs stable, patient tolerating soft diet without complication    Personal history of malignant neoplasm of breast  Assessment & Plan  History of stage 2 right breast CA  S/p lumpectomy and chemotherapy  Follows with oncology for OP surveillance    Iron deficiency anemia  Assessment & Plan  Noted  Continue supplementation    Hypertension, benign  Assessment & Plan  Chronic, vital signs stable, 135/84  Continue home medication regimen  Monitor vital signs once per shift    Mixed hyperlipidemia  Assessment & Plan  Continue Pravachol      Medical Problems       Resolved Problems  Date Reviewed: 5/17/2024   None       Discharging Physician / Practitioner: CAROL Murguia  PCP: Jose Camacho MD  Admission Date:   Admission Orders (From admission, onward)       Ordered        05/16/24 1343  INPATIENT ADMISSION  Once            05/15/24 1244  Place in Observation  Once                          Discharge Date: 05/17/24    Consultations During Hospital Stay:  None    Procedures Performed:   None    Significant Findings / Test Results:   CT abdomen pelvis with contrast    Result Date: 5/15/2024  Diverticulitis in distal descending colon/proximal sigmoid No fluid collection No free air Consider colonoscopy with acute illness subsides to exclude any  "colonic lesion Workstation performed: [id number]      Incidental Findings:   None    Test Results Pending at Discharge (will require follow up):   None     Outpatient Tests Requested:  Follow up with PCP within 1 wk    Complications:  None    Reason for Admission: Acute Diverticulitis    Hospital Course:   Chelsy Alford is a 69 y.o. female patient with past medical history anemia, depression, CHF, GERD, Hypertension who originally presented to the hospital on 5/15/2024 due to worsening left lower quadrant pain with nausea and vomiting.  Patient had a CT scan of the abdomen pelvis which did indicate acute diverticulitis.  Patient was initiated on IV antibiotics and was placed as n.p.o.  On 5/16, patient was initiated on IV soft diet as her symptoms resolved and tolerated this diet well.    She was able to have a successful bowel movement without any further nausea or vomiting episodes and was transition to p.o. Augmentin on discharge and will complete 7-day treatment.    Vital signs stable, labs are stable.    Please see above list of diagnoses and related plan for additional information.     Condition at Discharge: stable    Discharge Day Visit / Exam:   Subjective:  Patient resting in bed, no complaints of chest pain, shortness of breath, fever or chills.    Vitals: Blood Pressure: 135/84 (05/17/24 0705)  Pulse: 66 (05/17/24 0705)  Temperature: 97.9 °F (36.6 °C) (05/17/24 0705)  Temp Source: Oral (05/16/24 2141)  Respirations: 18 (05/17/24 0705)  Height: 5' 3\" (160 cm) (05/17/24 0643)  Weight - Scale: 83.8 kg (184 lb 11.9 oz) (05/16/24 1900)  SpO2: 91 % (05/17/24 0705)    Exam:   Physical Exam  Vitals and nursing note reviewed.   Constitutional:       General: She is not in acute distress.     Appearance: She is obese. She is not ill-appearing.   Cardiovascular:      Rate and Rhythm: Normal rate.      Pulses: Normal pulses.   Pulmonary:      Effort: Pulmonary effort is normal.   Abdominal:      General: " There is no distension.      Palpations: Abdomen is soft.      Tenderness: There is no abdominal tenderness.   Musculoskeletal:         General: Normal range of motion.      Right lower leg: No edema.      Left lower leg: No edema.   Skin:     General: Skin is warm and dry.   Neurological:      Mental Status: She is alert. Mental status is at baseline.   Psychiatric:         Mood and Affect: Mood normal.          Discussion with Family: Patient declined call to .     Discharge instructions/Information to patient and family:   See after visit summary for information provided to patient and family.      Provisions for Follow-Up Care:  See after visit summary for information related to follow-up care and any pertinent home health orders.      Mobility at time of Discharge:   Basic Mobility Inpatient Raw Score: 24  JH-HLM Goal: 8: Walk 250 feet or more  JH-HLM Achieved: 8: Walk 250 feet ot more  HLM Goal achieved. Continue to encourage appropriate mobility.     Disposition:   Home    Planned Readmission: None     Discharge Statement:  I spent 45 minutes discharging the patient. This time was spent on the day of discharge. I had direct contact with the patient on the day of discharge. Greater than 50% of the total time was spent examining patient, answering all patient questions, arranging and discussing plan of care with patient as well as directly providing post-discharge instructions.  Additional time then spent on discharge activities.    Discharge Medications:  See after visit summary for reconciled discharge medications provided to patient and/or family.      **Please Note: This note may have been constructed using a voice recognition system**

## 2024-05-17 NOTE — ASSESSMENT & PLAN NOTE
Chronic, vital signs stable, 135/84  Continue home medication regimen  Monitor vital signs once per shift

## 2024-05-20 DIAGNOSIS — Z71.89 COORDINATION OF COMPLEX CARE: Primary | ICD-10-CM

## 2024-05-21 ENCOUNTER — PATIENT OUTREACH (OUTPATIENT)
Dept: INTERNAL MEDICINE CLINIC | Facility: CLINIC | Age: 69
End: 2024-05-21

## 2024-05-21 ENCOUNTER — TELEPHONE (OUTPATIENT)
Age: 69
End: 2024-05-21

## 2024-05-21 NOTE — PROGRESS NOTES
Outpatient Care Management Note:    New HRR referral received. Patient was hospitalized from 5/15-5/17/24 with acute diverticulitis. On admission, she complained of left lower quadrant pain X 2-3 days. CT of abdomen indicated acute diverticulitis. She was treated with IV antibiotics and transitioned to augmentin X 10 days on discharge. She is to follow up with her PCP and GI.     Voice mail message left for Chelsy, with my contact information, introducing myself and requesting a call back.

## 2024-05-21 NOTE — UTILIZATION REVIEW
NOTIFICATION OF ADMISSION DISCHARGE   This is a Notification of Discharge from [facility]. Please be advised that this patient has been discharge from our facility. Below you will find the admission and discharge date and time including the patient’s disposition.   UTILIZATION REVIEW CONTACT:  Britni Ellis  Utilization   Network Utilization Review Department  Phone: [phone] x carefully listen to the prompts. All voicemails are confidential.  Email: [email or web address]     ADMISSION INFORMATION  PRESENTATION DATE: 5/15/2024  9:30 AM  OBERVATION ADMISSION DATE:   INPATIENT ADMISSION DATE: 5/16/24  1:43 PM   DISCHARGE DATE: 5/17/2024  1:13 PM   DISPOSITION:Home/Self Care    Network Utilization Review Department  ATTENTION: Please call with any questions or concerns to [phone] and carefully listen to the prompts so that you are directed to the right person. All voicemails are confidential.   For Discharge needs, contact Care Management DC Support Team at [phone] opt. [phone]  Send all requests for admission clinical reviews, approved or denied determinations and any other requests to dedicated fax number below belonging to the campus where the patient is receiving treatment. List of dedicated fax numbers for the Facilities:  FACILITY NAME UR FAX NUMBER   ADMISSION DENIALS (Administrative/Medical Necessity) [phone]   DISCHARGE SUPPORT TEAM ([facility]) [phone]   PARENT CHILD HEALTH (Maternity/NICU/Pediatrics) [phone]   [facility] [phone]   [facility] [phone]   [facility] [phone]   [facility] [phone]   [facility] [phone]   [facility] [phone]   [facility] [phone]   [facility]  [phone]   [facility] [phone]   [facility] [phone]   [facility] [phone]   [facility] [phone]

## 2024-05-28 ENCOUNTER — OFFICE VISIT (OUTPATIENT)
Dept: INTERNAL MEDICINE CLINIC | Facility: CLINIC | Age: 69
End: 2024-05-28
Payer: COMMERCIAL

## 2024-05-28 VITALS
OXYGEN SATURATION: 96 % | DIASTOLIC BLOOD PRESSURE: 72 MMHG | HEART RATE: 72 BPM | SYSTOLIC BLOOD PRESSURE: 118 MMHG | RESPIRATION RATE: 17 BRPM | BODY MASS INDEX: 32.6 KG/M2 | WEIGHT: 184 LBS | HEIGHT: 63 IN

## 2024-05-28 DIAGNOSIS — K57.92 ACUTE DIVERTICULITIS: Primary | ICD-10-CM

## 2024-05-28 DIAGNOSIS — D50.9 IRON DEFICIENCY ANEMIA, UNSPECIFIED IRON DEFICIENCY ANEMIA TYPE: ICD-10-CM

## 2024-05-28 PROCEDURE — 99495 TRANSJ CARE MGMT MOD F2F 14D: CPT | Performed by: INTERNAL MEDICINE

## 2024-05-28 NOTE — PROGRESS NOTES
Transition of Care Visit  Name: Chelsy Alford      : 1955      MRN: [id number]  Encounter Provider: Jose Camacho MD  Encounter Date: 2024   Encounter department: [facility] INTERNAL MEDICINE [place]    Assessment & Plan   1. Acute diverticulitis  2. Iron deficiency anemia, unspecified iron deficiency anemia type  -     CBC and differential; Future    Appears to be stable at this point.  Follow-up with GI as planned.  Recheck blood count in another week or 2 to make sure her blood count is returning to normal.       History of Present Illness     Transitional Care Management Review:   Chelsy Alford is a 69 y.o. female here for TCM follow up.     During the TCM phone call patient stated:  TCM Call     Date and time call was made  2024  2:20 PM    Hospital care reviewed  Records reviewed    Patient was hospitialized at  [facility]    Date of Admission  05/15/24    Date of discharge  24    Diagnosis  Acute diverticulitis    Disposition  Home    Were the patients medications reviewed and updated  Yes    Current Symptoms  None      TCM Call     Post hospital issues  None    Scheduled for follow up?  Yes    Did you obtain your prescribed medications  Yes    Do you need help managing your prescriptions or medications  No    Is transportation to your appointment needed  No    I have advised the patient to call PCP with any new or worsening symptoms  Luciana Norris, Practice Coordinator        Patient comes in today for hospital follow-up.  She was admitted with diverticulitis without abscess.  She was treated with IV antibiotics and then transition to oral agents.  This was her first episode.  She has been doing okay since discharge.  Finish the antibiotics.  Advancing her diet back to normal.  No problems in that regards.  She was somewhat anemic in the hospital but that had been returning to normal.  No other complaints today.  No further additions to her history.  Hospital  "records were reviewed.      Review of Systems   Gastrointestinal:  Negative for abdominal pain.     Objective     /72 (BP Location: Left arm, Patient Position: Sitting, Cuff Size: Adult)   Pulse 72   Resp 17   Ht 5' 3\" (1.6 m)   Wt 83.5 kg (184 lb)   SpO2 96%   BMI 32.59 kg/m²     Physical Exam  Vitals and nursing note reviewed.   Constitutional:       Appearance: Normal appearance.   Abdominal:      Tenderness: There is no abdominal tenderness.   Neurological:      Mental Status: She is alert.       Medications have been reviewed by provider in current encounter    Administrative Statements         "

## 2024-05-29 ENCOUNTER — PATIENT OUTREACH (OUTPATIENT)
Dept: INTERNAL MEDICINE CLINIC | Facility: CLINIC | Age: 69
End: 2024-05-29

## 2024-05-29 NOTE — LETTER
Date: 05/29/24    Dear Chelsy Alford,   My name is Kyung Gomes.  I am a registered nurse care manager working with   [facility] INTERNAL MEDICINE [place] [address]   [facility] [address]  [facility] [address] [phone].   I have not been able to reach you and would like to set a time that I can talk with you over the phone.  My work is to help patients that have complex medical conditions get the care they need. This includes patients who may have been in the hospital or emergency room.    Please call me with any questions you may have. I look forward to speaking with you.  Sincerely,  Kyung Gomes  [phone]  Outpatient Care Manager  Copy:  (primary care physician name and address)

## 2024-05-29 NOTE — PROGRESS NOTES
Outpatient Care Management Note:    Voice mail message left for Chelsy, with my contact information, introducing myself and requesting a call back. (2nd attempt)     Unable to reach letter sent via my chart.

## 2024-06-08 DIAGNOSIS — F33.0 MILD EPISODE OF RECURRENT MAJOR DEPRESSIVE DISORDER (HCC): ICD-10-CM

## 2024-06-12 ENCOUNTER — PATIENT OUTREACH (OUTPATIENT)
Dept: INTERNAL MEDICINE CLINIC | Facility: CLINIC | Age: 69
End: 2024-06-12

## 2024-06-17 ENCOUNTER — TELEPHONE (OUTPATIENT)
Age: 69
End: 2024-06-17

## 2024-06-17 DIAGNOSIS — I10 HYPERTENSION, BENIGN: ICD-10-CM

## 2024-06-17 NOTE — TELEPHONE ENCOUNTER
Pt called in regard to scheduling a sooner appointment. Pt was transferred to Mica the nurse to assist.

## 2024-06-19 DIAGNOSIS — D64.9 ANEMIA, UNSPECIFIED TYPE: ICD-10-CM

## 2024-06-19 RX ORDER — FERROUS SULFATE 325(65) MG
1 TABLET ORAL
Qty: 30 TABLET | Refills: 0 | Status: SHIPPED | OUTPATIENT
Start: 2024-06-19

## 2024-06-24 ENCOUNTER — TELEPHONE (OUTPATIENT)
Age: 69
End: 2024-06-24

## 2024-06-24 ENCOUNTER — OFFICE VISIT (OUTPATIENT)
Age: 69
End: 2024-06-24
Payer: COMMERCIAL

## 2024-06-24 VITALS
DIASTOLIC BLOOD PRESSURE: 80 MMHG | WEIGHT: 180 LBS | OXYGEN SATURATION: 96 % | HEIGHT: 63 IN | SYSTOLIC BLOOD PRESSURE: 124 MMHG | BODY MASS INDEX: 31.89 KG/M2 | HEART RATE: 78 BPM

## 2024-06-24 DIAGNOSIS — K57.92 DIVERTICULITIS: Primary | ICD-10-CM

## 2024-06-24 DIAGNOSIS — K31.819 GAVE (GASTRIC ANTRAL VASCULAR ECTASIA): ICD-10-CM

## 2024-06-24 DIAGNOSIS — D50.9 IRON DEFICIENCY ANEMIA, UNSPECIFIED IRON DEFICIENCY ANEMIA TYPE: ICD-10-CM

## 2024-06-24 PROCEDURE — 99204 OFFICE O/P NEW MOD 45 MIN: CPT | Performed by: PHYSICIAN ASSISTANT

## 2024-06-24 RX ORDER — NIFEDIPINE 30 MG/1
30 TABLET, EXTENDED RELEASE ORAL DAILY
COMMUNITY
Start: 2024-06-14

## 2024-06-24 NOTE — PROGRESS NOTES
[facility] - Outpatient Consultation  Chelsy Alford 69 y.o. female MRN: [id number]  Encounter: [id number]          ASSESSMENT AND PLAN:      1. Diverticulitis  2. Iron deficiency anemia  3. GAVE (gastric antral vascular ectasia)    Patient reports she developed LLQ abdominal pain and was admitted with acute diverticulitis.  CT Scan A/P showed diverticulitis in the distal descending colon/proximal sigmoid colon.  She received IV antibiotics and bowel rest during the admission and was transitioned to a po course upon discharge.  Her symptoms have resolved.  She also has a history of an iron deficiency anemia and is on iron supplementation.  Most recent HGB was 11.  EGD in 2019 showed GAVE syndrome s/p APC and a small HH.  Colonoscopy in 2019 showed 2 AVMs in the ascending colon s/p APC, left sided diverticulosis, IH, and a benign polyp was removed.    Will plan for EGD and colonoscopy to investigate (6-8 weeks after resolution of her acute diverticulitis).  Monitoring of CBC and iron supplementation per PCP.  High fiber diet and regular exercise/obtaining a healthy BMI recommended.    ______________________________________________________________________    HPI:  Patient is a pleasant 69 year old female with a PMH of HTN, breast cancer, hyperlipidemia who presents to the office after her hospitalization for acute diverticulitis (first episode).  Patient reports she developed LLQ abdominal pain and was admitted with acute diverticulitis. CT Scan A/P showed diverticulitis in the distal descending colon/proximal sigmoid colon.  She received IV antibiotics and bowel rest during the admission and was transitioned to a po course upon discharge. Her symptoms have resolved. She also has a history of an iron deficiency anemia and is on iron supplementation.  Most recent HGB was 11. EGD in 2019 showed GAVE syndrome s/p APC and a small HH.  Colonoscopy in 2019 showed 2 AVMs in the ascending colon  s/p APC, left sided diverticulosis, IH, and a benign polyp was removed. No melena or rectal bleeding. No family history of colon cancer.  She does not smoke.      REVIEW OF SYSTEMS:    CONSTITUTIONAL: Denies any fever, chills, rigors, and weight loss.  HEENT: No earache or tinnitus. Denies hearing loss or visual disturbances.  CARDIOVASCULAR: No chest pain or palpitations.   RESPIRATORY: Denies any cough, hemoptysis, shortness of breath or dyspnea on exertion.  GASTROINTESTINAL: As noted in the History of Present Illness.   GENITOURINARY: No problems with urination. Denies any hematuria or dysuria.  NEUROLOGIC: No dizziness or vertigo, denies headaches.   MUSCULOSKELETAL: Denies any muscle or joint pain.   SKIN: Denies skin rashes or itching.   ENDOCRINE: Denies excessive thirst. Denies intolerance to heat or cold.  PSYCHOSOCIAL: Denies depression or anxiety. Denies any recent memory loss.       Historical Information   Past Medical History:   Diagnosis Date    Anemia     Arthritis     Bacterial pneumonia     last assessed: 2017    Borderline diabetes     Breast cancer (HCC) 2003    right    Depression     Diastolic dysfunction     GERD (gastroesophageal reflux disease)     High cholesterol     History of chemotherapy 2003    right breast ca    History of radiation therapy 2003    right breast ca    Hypertension     Insomnia     Palpitations      Past Surgical History:   Procedure Laterality Date    BREAST LUMPECTOMY Right 2003     SECTION      COLONOSCOPY      ESOPHAGOGASTRODUODENOSCOPY N/A 2019    Procedure: ESOPHAGOGASTRODUODENOSCOPY (EGD);  Surgeon: Poli Riojas III, MD;  Location: [facility];  Service: Gastroenterology    JOINT REPLACEMENT Right     knee    KNEE SURGERY      VA ARTHRP KNE CONDYLE&PLATU MEDIAL&LAT COMPARTMENTS Right 2016    Procedure: ARTHROPLASTY KNEE TOTAL;  Surgeon: Angela Noel MD;  Location: [facility] OR;  Service: Orthopedics    VA ARTHRP KNE CONDYLE&PLATU  MEDIAL&LAT COMPARTMENTS Left 03/15/2023    Procedure: Left total knee arthroplasty;  Surgeon: Tony Cano DO;  Location: [facility];  Service: Orthopedics    AZ COLONOSCOPY FLX DX W/COLLJ SPEC WHEN PFRMD N/A 12/31/2018    Procedure: COLONOSCOPY;  Surgeon: Poli Riojas III, MD;  Location: [facility];  Service: Gastroenterology    AZ ESOPHAGOGASTRODUODENOSCOPY TRANSORAL DIAGNOSTIC N/A 12/31/2018    Procedure: ESOPHAGOGASTRODUODENOSCOPY (EGD);  Surgeon: Poli Riojas III, MD;  Location: [facility];  Service: Gastroenterology     Social History   Social History     Substance and Sexual Activity   Alcohol Use Never     Social History     Substance and Sexual Activity   Drug Use No     Social History     Tobacco Use   Smoking Status Never   Smokeless Tobacco Never     Family History   Problem Relation Age of Onset    Diabetes Mother     Diabetes Father     Diabetes Sister     Cancer Sister     Diabetes Brother     No Known Problems Son     Thyroid disease Daughter     Diabetes Brother     No Known Problems Brother     No Known Problems Brother     No Known Problems Brother     No Known Problems Brother     No Known Problems Brother     Diabetes Sister     Cancer Sister     Diabetes Sister     Cancer Sister     No Known Problems Sister     No Known Problems Sister     No Known Problems Sister     No Known Problems Son     Breast cancer Neg Hx        Meds/Allergies       Current Outpatient Medications:     ferrous sulfate 325 (65 Fe) mg tablet    hydroCHLOROthiazide 25 mg tablet    metoprolol tartrate (LOPRESSOR) 50 mg tablet    NIFEdipine (PROCARDIA XL) 30 mg 24 hr tablet    NIFEdipine ER (ADALAT CC) 30 MG 24 hr tablet    ondansetron (ZOFRAN) 4 mg tablet    pantoprazole (PROTONIX) 40 mg tablet    sertraline (ZOLOFT) 50 mg tablet    simvastatin (ZOCOR) 20 mg tablet    valsartan (DIOVAN) 320 MG tablet    Allergies   Allergen Reactions    Ancef [Cefazolin] Hives     Possible hives during TKA surgery 3/15/2023.  See  "postop note.           Objective     Blood pressure 124/80, pulse 78, height 5' 3\" (1.6 m), weight 81.6 kg (180 lb), SpO2 96%, not currently breastfeeding. Body mass index is 31.89 kg/m².        PHYSICAL EXAM:      General Appearance:   Alert, cooperative, no distress   HEENT:   Normocephalic, atraumatic, anicteric.     Neck:  Supple, symmetrical, trachea midline   Lungs:   Clear to auscultation bilaterally; no rales, rhonchi or wheezing; respirations unlabored    Heart::   Regular rate and rhythm; no murmur, rub, or gallop.   Abdomen:   Soft, non-tender, non-distended; normal bowel sounds; no masses, no organomegaly    Genitalia:   Deferred    Rectal:   Deferred    Extremities:  No cyanosis, clubbing or edema    Pulses:  2+ and symmetric    Skin:  No jaundice, rashes, or lesions    Lymph nodes:  No palpable cervical lymphadenopathy        Lab Results:   No visits with results within 1 Day(s) from this visit.   Latest known visit with results is:   Admission on 05/15/2024, Discharged on 05/17/2024   Component Date Value    WBC 05/15/2024 7.57     RBC 05/15/2024 4.11     Hemoglobin 05/15/2024 12.3     Hematocrit 05/15/2024 36.8     MCV 05/15/2024 90     MCH 05/15/2024 29.9     MCHC 05/15/2024 33.4     RDW 05/15/2024 14.1     MPV 05/15/2024 9.2     Platelets 05/15/2024 174     nRBC 05/15/2024 0     Segmented % 05/15/2024 72     Immature Grans % 05/15/2024 0     Lymphocytes % 05/15/2024 15     Monocytes % 05/15/2024 10     Eosinophils Relative 05/15/2024 3     Basophils Relative 05/15/2024 0     Absolute Neutrophils 05/15/2024 5.42     Absolute Immature Grans 05/15/2024 0.02     Absolute Lymphocytes 05/15/2024 1.12     Absolute Monocytes 05/15/2024 0.79     Eosinophils Absolute 05/15/2024 0.19     Basophils Absolute 05/15/2024 0.03     Sodium 05/15/2024 138     Potassium 05/15/2024 4.1     Chloride 05/15/2024 107     CO2 05/15/2024 25     ANION GAP 05/15/2024 6     BUN 05/15/2024 19     Creatinine 05/15/2024 0.84     " Glucose 05/15/2024 142 (H)     Calcium 05/15/2024 9.5     AST 05/15/2024 18     ALT 05/15/2024 12     Alkaline Phosphatase 05/15/2024 86     Total Protein 05/15/2024 7.5     Albumin 05/15/2024 4.0     Total Bilirubin 05/15/2024 0.53     eGFR 05/15/2024 71     Lipase 05/15/2024 15     Color, UA 05/15/2024 Colorless     Clarity, UA 05/15/2024 Clear     Specific Gravity, UA 05/15/2024 1.025     pH, UA 05/15/2024 5.5     Leukocytes, UA 05/15/2024 Negative     Nitrite, UA 05/15/2024 Negative     Protein, UA 05/15/2024 Negative     Glucose, UA 05/15/2024 Negative     Ketones, UA 05/15/2024 Negative     Urobilinogen, UA 05/15/2024 <2.0     Bilirubin, UA 05/15/2024 Negative     Occult Blood, UA 05/15/2024 Negative     Sodium 05/16/2024 140     Potassium 05/16/2024 3.7     Chloride 05/16/2024 108     CO2 05/16/2024 25     ANION GAP 05/16/2024 7     BUN 05/16/2024 12     Creatinine 05/16/2024 0.69     Glucose 05/16/2024 90     Glucose, Fasting 05/16/2024 90     Calcium 05/16/2024 8.7     eGFR 05/16/2024 89     WBC 05/16/2024 4.47     RBC 05/16/2024 3.53 (L)     Hemoglobin 05/16/2024 10.7 (L)     Hematocrit 05/16/2024 31.5 (L)     MCV 05/16/2024 89     MCH 05/16/2024 30.3     MCHC 05/16/2024 34.0     RDW 05/16/2024 14.3     MPV 05/16/2024 9.4     Platelets 05/16/2024 168     nRBC 05/16/2024 0     Segmented % 05/16/2024 55     Immature Grans % 05/16/2024 0     Lymphocytes % 05/16/2024 30     Monocytes % 05/16/2024 9     Eosinophils Relative 05/16/2024 5     Basophils Relative 05/16/2024 1     Absolute Neutrophils 05/16/2024 2.44     Absolute Immature Grans 05/16/2024 0.01     Absolute Lymphocytes 05/16/2024 1.36     Absolute Monocytes 05/16/2024 0.40     Eosinophils Absolute 05/16/2024 0.23     Basophils Absolute 05/16/2024 0.03     Sodium 05/17/2024 138     Potassium 05/17/2024 3.8     Chloride 05/17/2024 107     CO2 05/17/2024 24     ANION GAP 05/17/2024 7     BUN 05/17/2024 15     Creatinine 05/17/2024 0.83     Glucose  05/17/2024 124     Calcium 05/17/2024 9.1     eGFR 05/17/2024 72     WBC 05/17/2024 3.82 (L)     RBC 05/17/2024 3.66 (L)     Hemoglobin 05/17/2024 11.0 (L)     Hematocrit 05/17/2024 32.7 (L)     MCV 05/17/2024 89     MCH 05/17/2024 30.1     MCHC 05/17/2024 33.6     RDW 05/17/2024 13.9     Platelets 05/17/2024 182     MPV 05/17/2024 9.2     Magnesium 05/17/2024 1.9          Radiology Results:   No results found.

## 2024-06-24 NOTE — H&P (VIEW-ONLY)
[facility] - Outpatient Consultation  Chelsy Alford 69 y.o. female MRN: [id number]  Encounter: [id number]          ASSESSMENT AND PLAN:      1. Diverticulitis  2. Iron deficiency anemia  3. GAVE (gastric antral vascular ectasia)    Patient reports she developed LLQ abdominal pain and was admitted with acute diverticulitis.  CT Scan A/P showed diverticulitis in the distal descending colon/proximal sigmoid colon.  She received IV antibiotics and bowel rest during the admission and was transitioned to a po course upon discharge.  Her symptoms have resolved.  She also has a history of an iron deficiency anemia and is on iron supplementation.  Most recent HGB was 11.  EGD in 2019 showed GAVE syndrome s/p APC and a small HH.  Colonoscopy in 2019 showed 2 AVMs in the ascending colon s/p APC, left sided diverticulosis, IH, and a benign polyp was removed.    Will plan for EGD and colonoscopy to investigate (6-8 weeks after resolution of her acute diverticulitis).  Monitoring of CBC and iron supplementation per PCP.  High fiber diet and regular exercise/obtaining a healthy BMI recommended.    ______________________________________________________________________    HPI:  Patient is a pleasant 69 year old female with a PMH of HTN, breast cancer, hyperlipidemia who presents to the office after her hospitalization for acute diverticulitis (first episode).  Patient reports she developed LLQ abdominal pain and was admitted with acute diverticulitis. CT Scan A/P showed diverticulitis in the distal descending colon/proximal sigmoid colon.  She received IV antibiotics and bowel rest during the admission and was transitioned to a po course upon discharge. Her symptoms have resolved. She also has a history of an iron deficiency anemia and is on iron supplementation.  Most recent HGB was 11. EGD in 2019 showed GAVE syndrome s/p APC and a small HH.  Colonoscopy in 2019 showed 2 AVMs in the ascending colon  s/p APC, left sided diverticulosis, IH, and a benign polyp was removed. No melena or rectal bleeding. No family history of colon cancer.  She does not smoke.      REVIEW OF SYSTEMS:    CONSTITUTIONAL: Denies any fever, chills, rigors, and weight loss.  HEENT: No earache or tinnitus. Denies hearing loss or visual disturbances.  CARDIOVASCULAR: No chest pain or palpitations.   RESPIRATORY: Denies any cough, hemoptysis, shortness of breath or dyspnea on exertion.  GASTROINTESTINAL: As noted in the History of Present Illness.   GENITOURINARY: No problems with urination. Denies any hematuria or dysuria.  NEUROLOGIC: No dizziness or vertigo, denies headaches.   MUSCULOSKELETAL: Denies any muscle or joint pain.   SKIN: Denies skin rashes or itching.   ENDOCRINE: Denies excessive thirst. Denies intolerance to heat or cold.  PSYCHOSOCIAL: Denies depression or anxiety. Denies any recent memory loss.       Historical Information   Past Medical History:   Diagnosis Date    Anemia     Arthritis     Bacterial pneumonia     last assessed: 2017    Borderline diabetes     Breast cancer (HCC) 2003    right    Depression     Diastolic dysfunction     GERD (gastroesophageal reflux disease)     High cholesterol     History of chemotherapy 2003    right breast ca    History of radiation therapy 2003    right breast ca    Hypertension     Insomnia     Palpitations      Past Surgical History:   Procedure Laterality Date    BREAST LUMPECTOMY Right 2003     SECTION      COLONOSCOPY      ESOPHAGOGASTRODUODENOSCOPY N/A 2019    Procedure: ESOPHAGOGASTRODUODENOSCOPY (EGD);  Surgeon: Poli Riojas III, MD;  Location: [facility];  Service: Gastroenterology    JOINT REPLACEMENT Right     knee    KNEE SURGERY      CA ARTHRP KNE CONDYLE&PLATU MEDIAL&LAT COMPARTMENTS Right 2016    Procedure: ARTHROPLASTY KNEE TOTAL;  Surgeon: Angela Noel MD;  Location: [facility] OR;  Service: Orthopedics    CA ARTHRP KNE CONDYLE&PLATU  MEDIAL&LAT COMPARTMENTS Left 03/15/2023    Procedure: Left total knee arthroplasty;  Surgeon: Tony Cano DO;  Location: [facility];  Service: Orthopedics    WI COLONOSCOPY FLX DX W/COLLJ SPEC WHEN PFRMD N/A 12/31/2018    Procedure: COLONOSCOPY;  Surgeon: Poli Riojas III, MD;  [facility]: [facility];  Service: Gastroenterology    WI ESOPHAGOGASTRODUODENOSCOPY TRANSORAL DIAGNOSTIC N/A 12/31/2018    Procedure: ESOPHAGOGASTRODUODENOSCOPY (EGD);  Surgeon: Poli Riojas III, MD;  [facility]: [facility];  Service: Gastroenterology     Social History   Social History     Substance and Sexual Activity   Alcohol Use Never     Social History     Substance and Sexual Activity   Drug Use No     Social History     Tobacco Use   Smoking Status Never   Smokeless Tobacco Never     Family History   Problem Relation Age of Onset    Diabetes Mother     Diabetes Father     Diabetes Sister     Cancer Sister     Diabetes Brother     No Known Problems Son     Thyroid disease Daughter     Diabetes Brother     No Known Problems Brother     No Known Problems Brother     No Known Problems Brother     No Known Problems Brother     No Known Problems Brother     Diabetes Sister     Cancer Sister     Diabetes Sister     Cancer Sister     No Known Problems Sister     No Known Problems Sister     No Known Problems Sister     No Known Problems Son     Breast cancer Neg Hx        Meds/Allergies       Current Outpatient Medications:     ferrous sulfate 325 (65 Fe) mg tablet    hydroCHLOROthiazide 25 mg tablet    metoprolol tartrate (LOPRESSOR) 50 mg tablet    NIFEdipine (PROCARDIA XL) 30 mg 24 hr tablet    NIFEdipine ER (ADALAT CC) 30 MG 24 hr tablet    ondansetron (ZOFRAN) 4 mg tablet    pantoprazole (PROTONIX) 40 mg tablet    sertraline (ZOLOFT) 50 mg tablet    simvastatin (ZOCOR) 20 mg tablet    valsartan (DIOVAN) 320 MG tablet    Allergies   Allergen Reactions    Ancef [Cefazolin] Hives     Possible hives during TKA surgery 3/15/2023.  See  "postop note.           Objective     Blood pressure 124/80, pulse 78, height 5' 3\" (1.6 m), weight 81.6 kg (180 lb), SpO2 96%, not currently breastfeeding. Body mass index is 31.89 kg/m².        PHYSICAL EXAM:      General Appearance:   Alert, cooperative, no distress   HEENT:   Normocephalic, atraumatic, anicteric.     Neck:  Supple, symmetrical, trachea midline   Lungs:   Clear to auscultation bilaterally; no rales, rhonchi or wheezing; respirations unlabored    Heart::   Regular rate and rhythm; no murmur, rub, or gallop.   Abdomen:   Soft, non-tender, non-distended; normal bowel sounds; no masses, no organomegaly    Genitalia:   Deferred    Rectal:   Deferred    Extremities:  No cyanosis, clubbing or edema    Pulses:  2+ and symmetric    Skin:  No jaundice, rashes, or lesions    Lymph nodes:  No palpable cervical lymphadenopathy        Lab Results:   No visits with results within 1 Day(s) from this visit.   Latest known visit with results is:   Admission on 05/15/2024, Discharged on 05/17/2024   Component Date Value    WBC 05/15/2024 7.57     RBC 05/15/2024 4.11     Hemoglobin 05/15/2024 12.3     Hematocrit 05/15/2024 36.8     MCV 05/15/2024 90     MCH 05/15/2024 29.9     MCHC 05/15/2024 33.4     RDW 05/15/2024 14.1     MPV 05/15/2024 9.2     Platelets 05/15/2024 174     nRBC 05/15/2024 0     Segmented % 05/15/2024 72     Immature Grans % 05/15/2024 0     Lymphocytes % 05/15/2024 15     Monocytes % 05/15/2024 10     Eosinophils Relative 05/15/2024 3     Basophils Relative 05/15/2024 0     Absolute Neutrophils 05/15/2024 5.42     Absolute Immature Grans 05/15/2024 0.02     Absolute Lymphocytes 05/15/2024 1.12     Absolute Monocytes 05/15/2024 0.79     Eosinophils Absolute 05/15/2024 0.19     Basophils Absolute 05/15/2024 0.03     Sodium 05/15/2024 138     Potassium 05/15/2024 4.1     Chloride 05/15/2024 107     CO2 05/15/2024 25     ANION GAP 05/15/2024 6     BUN 05/15/2024 19     Creatinine 05/15/2024 0.84     " Glucose 05/15/2024 142 (H)     Calcium 05/15/2024 9.5     AST 05/15/2024 18     ALT 05/15/2024 12     Alkaline Phosphatase 05/15/2024 86     Total Protein 05/15/2024 7.5     Albumin 05/15/2024 4.0     Total Bilirubin 05/15/2024 0.53     eGFR 05/15/2024 71     Lipase 05/15/2024 15     Color, UA 05/15/2024 Colorless     Clarity, UA 05/15/2024 Clear     Specific Gravity, UA 05/15/2024 1.025     pH, UA 05/15/2024 5.5     Leukocytes, UA 05/15/2024 Negative     Nitrite, UA 05/15/2024 Negative     Protein, UA 05/15/2024 Negative     Glucose, UA 05/15/2024 Negative     Ketones, UA 05/15/2024 Negative     Urobilinogen, UA 05/15/2024 <2.0     Bilirubin, UA 05/15/2024 Negative     Occult Blood, UA 05/15/2024 Negative     Sodium 05/16/2024 140     Potassium 05/16/2024 3.7     Chloride 05/16/2024 108     CO2 05/16/2024 25     ANION GAP 05/16/2024 7     BUN 05/16/2024 12     Creatinine 05/16/2024 0.69     Glucose 05/16/2024 90     Glucose, Fasting 05/16/2024 90     Calcium 05/16/2024 8.7     eGFR 05/16/2024 89     WBC 05/16/2024 4.47     RBC 05/16/2024 3.53 (L)     Hemoglobin 05/16/2024 10.7 (L)     Hematocrit 05/16/2024 31.5 (L)     MCV 05/16/2024 89     MCH 05/16/2024 30.3     MCHC 05/16/2024 34.0     RDW 05/16/2024 14.3     MPV 05/16/2024 9.4     Platelets 05/16/2024 168     nRBC 05/16/2024 0     Segmented % 05/16/2024 55     Immature Grans % 05/16/2024 0     Lymphocytes % 05/16/2024 30     Monocytes % 05/16/2024 9     Eosinophils Relative 05/16/2024 5     Basophils Relative 05/16/2024 1     Absolute Neutrophils 05/16/2024 2.44     Absolute Immature Grans 05/16/2024 0.01     Absolute Lymphocytes 05/16/2024 1.36     Absolute Monocytes 05/16/2024 0.40     Eosinophils Absolute 05/16/2024 0.23     Basophils Absolute 05/16/2024 0.03     Sodium 05/17/2024 138     Potassium 05/17/2024 3.8     Chloride 05/17/2024 107     CO2 05/17/2024 24     ANION GAP 05/17/2024 7     BUN 05/17/2024 15     Creatinine 05/17/2024 0.83     Glucose  05/17/2024 124     Calcium 05/17/2024 9.1     eGFR 05/17/2024 72     WBC 05/17/2024 3.82 (L)     RBC 05/17/2024 3.66 (L)     Hemoglobin 05/17/2024 11.0 (L)     Hematocrit 05/17/2024 32.7 (L)     MCV 05/17/2024 89     MCH 05/17/2024 30.1     MCHC 05/17/2024 33.6     RDW 05/17/2024 13.9     Platelets 05/17/2024 182     MPV 05/17/2024 9.2     Magnesium 05/17/2024 1.9          Radiology Results:   No results found.

## 2024-06-24 NOTE — TELEPHONE ENCOUNTER
Good Morning! Vanessa Ferrell ordered the ptn an EGD/colonoscopy to take place the 2nd week of July or later with Dr. Riojas. Ptn was given the Miralax prep. Please call to schedule. Thanks!

## 2024-06-25 DIAGNOSIS — I10 ESSENTIAL HYPERTENSION: ICD-10-CM

## 2024-06-25 DIAGNOSIS — I10 HYPERTENSION, BENIGN: ICD-10-CM

## 2024-06-25 RX ORDER — NIFEDIPINE 30 MG/1
30 TABLET, EXTENDED RELEASE ORAL DAILY
Qty: 90 TABLET | Refills: 1 | Status: SHIPPED | OUTPATIENT
Start: 2024-06-25

## 2024-06-25 RX ORDER — HYDROCHLOROTHIAZIDE 25 MG/1
25 TABLET ORAL DAILY
Qty: 90 TABLET | Refills: 1 | Status: SHIPPED | OUTPATIENT
Start: 2024-06-25

## 2024-06-26 NOTE — TELEPHONE ENCOUNTER
Scheduled date of EGD/colonoscopy (as of today): 7/8/24    Physician performing EGD/colonoscopy: PETRA    Location of EGD/colonoscopy: [facility]    Desired bowel prep reviewed with patient: given in   office  Instructions reviewed with patient by:office    Clearances:

## 2024-06-28 ENCOUNTER — TELEPHONE (OUTPATIENT)
Age: 69
End: 2024-06-28

## 2024-06-28 NOTE — TELEPHONE ENCOUNTER
"Patient called stating she has been feeling bloated, has had diarrhea for a couple of days (no pain) \"just feeling bloated\" Not sure if its related to diverticulitis. Wants to know if PCP can order something for her.Declined an appt. as she has to work today. She would like something called in if possible. Or would like to know what she can take.[employer] [facility] [phone]    "

## 2024-07-01 DIAGNOSIS — E78.2 MIXED HYPERLIPIDEMIA: ICD-10-CM

## 2024-07-01 RX ORDER — SIMVASTATIN 20 MG
20 TABLET ORAL
Qty: 30 TABLET | Refills: 5 | Status: SHIPPED | OUTPATIENT
Start: 2024-07-01

## 2024-07-09 ENCOUNTER — HOSPITAL ENCOUNTER (EMERGENCY)
Facility: HOSPITAL | Age: 69
Discharge: HOME | End: 2024-07-09
Attending: EMERGENCY MEDICINE | Admitting: EMERGENCY MEDICINE
Payer: COMMERCIAL

## 2024-07-09 ENCOUNTER — APPOINTMENT (EMERGENCY)
Dept: CT IMAGING | Facility: HOSPITAL | Age: 69
End: 2024-07-09
Payer: COMMERCIAL

## 2024-07-09 VITALS
RESPIRATION RATE: 16 BRPM | SYSTOLIC BLOOD PRESSURE: 118 MMHG | TEMPERATURE: 98 F | HEART RATE: 67 BPM | BODY MASS INDEX: 32.3 KG/M2 | DIASTOLIC BLOOD PRESSURE: 66 MMHG | WEIGHT: 182.32 LBS | OXYGEN SATURATION: 94 %

## 2024-07-09 DIAGNOSIS — R10.9 RIGHT FLANK PAIN: Primary | ICD-10-CM

## 2024-07-09 LAB
ALBUMIN SERPL BCG-MCNC: 4.2 G/DL (ref 3.5–5)
ALP SERPL-CCNC: 67 U/L (ref 34–104)
ALT SERPL W P-5'-P-CCNC: 11 U/L (ref 7–52)
ANION GAP SERPL CALCULATED.3IONS-SCNC: 6 MMOL/L (ref 4–13)
AST SERPL W P-5'-P-CCNC: 18 U/L (ref 13–39)
BASOPHILS # BLD AUTO: 0.02 THOUSANDS/ÂΜL (ref 0–0.1)
BASOPHILS NFR BLD AUTO: 1 % (ref 0–1)
BILIRUB SERPL-MCNC: 0.38 MG/DL (ref 0.2–1)
BILIRUB UR QL STRIP: NEGATIVE
BUN SERPL-MCNC: 29 MG/DL (ref 5–25)
CALCIUM SERPL-MCNC: 9.7 MG/DL (ref 8.4–10.2)
CHLORIDE SERPL-SCNC: 107 MMOL/L (ref 96–108)
CLARITY UR: CLEAR
CO2 SERPL-SCNC: 25 MMOL/L (ref 21–32)
COLOR UR: NORMAL
CREAT SERPL-MCNC: 0.87 MG/DL (ref 0.6–1.3)
EOSINOPHIL # BLD AUTO: 0.1 THOUSAND/ÂΜL (ref 0–0.61)
EOSINOPHIL NFR BLD AUTO: 2 % (ref 0–6)
ERYTHROCYTE [DISTWIDTH] IN BLOOD BY AUTOMATED COUNT: 13.8 % (ref 11.6–15.1)
GFR SERPL CREATININE-BSD FRML MDRD: 68 ML/MIN/1.73SQ M
GLUCOSE SERPL-MCNC: 94 MG/DL (ref 65–140)
GLUCOSE UR STRIP-MCNC: NEGATIVE MG/DL
HCT VFR BLD AUTO: 31.9 % (ref 34.8–46.1)
HGB BLD-MCNC: 10.8 G/DL (ref 11.5–15.4)
HGB UR QL STRIP.AUTO: NEGATIVE
IMM GRANULOCYTES # BLD AUTO: 0.01 THOUSAND/UL (ref 0–0.2)
IMM GRANULOCYTES NFR BLD AUTO: 0 % (ref 0–2)
KETONES UR STRIP-MCNC: NEGATIVE MG/DL
LACTATE SERPL-SCNC: 0.7 MMOL/L (ref 0.5–2)
LEUKOCYTE ESTERASE UR QL STRIP: NEGATIVE
LIPASE SERPL-CCNC: 13 U/L (ref 11–82)
LYMPHOCYTES # BLD AUTO: 1.19 THOUSANDS/ÂΜL (ref 0.6–4.47)
LYMPHOCYTES NFR BLD AUTO: 28 % (ref 14–44)
MCH RBC QN AUTO: 30.1 PG (ref 26.8–34.3)
MCHC RBC AUTO-ENTMCNC: 33.9 G/DL (ref 31.4–37.4)
MCV RBC AUTO: 89 FL (ref 82–98)
MONOCYTES # BLD AUTO: 0.39 THOUSAND/ÂΜL (ref 0.17–1.22)
MONOCYTES NFR BLD AUTO: 9 % (ref 4–12)
NEUTROPHILS # BLD AUTO: 2.62 THOUSANDS/ÂΜL (ref 1.85–7.62)
NEUTS SEG NFR BLD AUTO: 60 % (ref 43–75)
NITRITE UR QL STRIP: NEGATIVE
NRBC BLD AUTO-RTO: 0 /100 WBCS
PH UR STRIP.AUTO: 5 [PH]
PLATELET # BLD AUTO: 208 THOUSANDS/UL (ref 149–390)
PMV BLD AUTO: 9.5 FL (ref 8.9–12.7)
POTASSIUM SERPL-SCNC: 4.2 MMOL/L (ref 3.5–5.3)
PROT SERPL-MCNC: 6.9 G/DL (ref 6.4–8.4)
PROT UR STRIP-MCNC: NEGATIVE MG/DL
RBC # BLD AUTO: 3.59 MILLION/UL (ref 3.81–5.12)
SODIUM SERPL-SCNC: 138 MMOL/L (ref 135–147)
SP GR UR STRIP.AUTO: 1.02 (ref 1–1.03)
UROBILINOGEN UR STRIP-ACNC: <2 MG/DL
WBC # BLD AUTO: 4.33 THOUSAND/UL (ref 4.31–10.16)

## 2024-07-09 PROCEDURE — 85025 COMPLETE CBC W/AUTO DIFF WBC: CPT | Performed by: EMERGENCY MEDICINE

## 2024-07-09 PROCEDURE — 83605 ASSAY OF LACTIC ACID: CPT | Performed by: EMERGENCY MEDICINE

## 2024-07-09 PROCEDURE — 99284 EMERGENCY DEPT VISIT MOD MDM: CPT

## 2024-07-09 PROCEDURE — 36415 COLL VENOUS BLD VENIPUNCTURE: CPT | Performed by: EMERGENCY MEDICINE

## 2024-07-09 PROCEDURE — 96374 THER/PROPH/DIAG INJ IV PUSH: CPT

## 2024-07-09 PROCEDURE — 80053 COMPREHEN METABOLIC PANEL: CPT | Performed by: EMERGENCY MEDICINE

## 2024-07-09 PROCEDURE — 96361 HYDRATE IV INFUSION ADD-ON: CPT

## 2024-07-09 PROCEDURE — 81003 URINALYSIS AUTO W/O SCOPE: CPT | Performed by: EMERGENCY MEDICINE

## 2024-07-09 PROCEDURE — 74177 CT ABD & PELVIS W/CONTRAST: CPT

## 2024-07-09 PROCEDURE — 83690 ASSAY OF LIPASE: CPT | Performed by: EMERGENCY MEDICINE

## 2024-07-09 RX ORDER — NAPROXEN 500 MG/1
500 TABLET ORAL 2 TIMES DAILY WITH MEALS
Qty: 30 TABLET | Refills: 0 | Status: SHIPPED | OUTPATIENT
Start: 2024-07-09

## 2024-07-09 RX ORDER — KETOROLAC TROMETHAMINE 30 MG/ML
15 INJECTION, SOLUTION INTRAMUSCULAR; INTRAVENOUS ONCE
Status: COMPLETED | OUTPATIENT
Start: 2024-07-09 | End: 2024-07-09

## 2024-07-09 RX ORDER — METHOCARBAMOL 500 MG/1
500 TABLET, FILM COATED ORAL 2 TIMES DAILY
Qty: 10 TABLET | Refills: 0 | Status: SHIPPED | OUTPATIENT
Start: 2024-07-09

## 2024-07-09 RX ADMIN — IOHEXOL 100 ML: 350 INJECTION, SOLUTION INTRAVENOUS at 18:00

## 2024-07-09 RX ADMIN — SODIUM CHLORIDE 1000 ML: 0.9 INJECTION, SOLUTION INTRAVENOUS at 17:12

## 2024-07-09 RX ADMIN — KETOROLAC TROMETHAMINE 15 MG: 30 INJECTION, SOLUTION INTRAMUSCULAR at 17:12

## 2024-07-09 NOTE — ED PROVIDER NOTES
History  Chief Complaint   Patient presents with    Flank Pain     R sided flank pain x 3 day, worse today.      70 y/o female presents to the ED for R flank pain x 2 days. She states that she has had constant achy pain that has been worsening since onset. Nothing worsens or improves the pain. States that she does have a hx of diverticulitis and states that symptoms feel similar. States that she had to be admitted for IV abx for diverituclitis in the past but denies any hx of procedures for it in the past. Denies any fever, n/v, urinary symptoms, cp, or sob. Has not tried anything for the symptoms.       History provided by:  Patient  Flank Pain  Pain location:  R flank  Pain quality: aching    Pain radiates to:  Does not radiate  Pain severity:  Moderate  Onset quality:  Sudden  Timing:  Constant  Progression:  Worsening  Chronicity:  New  Context: not previous surgeries and not sick contacts    Relieved by:  None tried  Worsened by:  Nothing  Ineffective treatments:  None tried  Associated symptoms: no chest pain, no chills, no cough, no diarrhea, no dysuria, no fever, no hematuria, no nausea, no shortness of breath, no sore throat and no vomiting        Prior to Admission Medications   Prescriptions Last Dose Informant Patient Reported? Taking?   NIFEdipine (PROCARDIA XL) 30 mg 24 hr tablet  Self Yes No   Sig: Take 30 mg by mouth daily   NIFEdipine (PROCARDIA XL) 30 mg 24 hr tablet   No No   Sig: TAKE 1 TABLET (30 MG TOTAL) BY MOUTH DAILY   ferrous sulfate 325 (65 Fe) mg tablet  Self No No   Sig: TAKE 1 TABLET (325 MG TOTAL) BY MOUTH DAILY WITH BREAKFAST   hydroCHLOROthiazide 25 mg tablet   No No   Sig: TAKE 1 TABLET (25 MG TOTAL) BY MOUTH DAILY   metoprolol tartrate (LOPRESSOR) 50 mg tablet  Self No No   Sig: TAKE 1 TABLET (50 MG TOTAL) BY MOUTH 2 (TWO) TIMES A DAY   ondansetron (ZOFRAN) 4 mg tablet  Self No No   Sig: Take 1 tablet (4 mg total) by mouth every 6 (six) hours as needed for nausea or vomiting    pantoprazole (PROTONIX) 40 mg tablet  Self No No   Sig: TAKE ONE (1) TABLET BY MOUTH DAILY IN THE MORNING   sertraline (ZOLOFT) 50 mg tablet  Self No No   Sig: TAKE 1 TABLET (50 MG TOTAL) BY MOUTH DAILY   simvastatin (ZOCOR) 20 mg tablet   No No   Sig: TAKE 1 TABLET (20 MG TOTAL) BY MOUTH DAILY AT BEDTIME   valsartan (DIOVAN) 320 MG tablet  Self No No   Sig: Take 1 tablet (320 mg total) by mouth daily      Facility-Administered Medications: None       Past Medical History:   Diagnosis Date    Anemia     Arthritis     Bacterial pneumonia     last assessed: 2017    Borderline diabetes     Breast cancer (HCC) 2003    right    Depression     Diastolic dysfunction     GERD (gastroesophageal reflux disease)     High cholesterol     History of chemotherapy 2003    right breast ca    History of radiation therapy 2003    right breast ca    Hypertension     Insomnia     Palpitations        Past Surgical History:   Procedure Laterality Date    BREAST LUMPECTOMY Right 2003     SECTION      COLONOSCOPY      ESOPHAGOGASTRODUODENOSCOPY N/A 2019    Procedure: ESOPHAGOGASTRODUODENOSCOPY (EGD);  Surgeon: Poli Riojas III, MD;  Location: [facility];  Service: Gastroenterology    JOINT REPLACEMENT Right     knee    KNEE SURGERY      VA ARTHRP KNE CONDYLE&PLATU MEDIAL&LAT COMPARTMENTS Right 2016    Procedure: ARTHROPLASTY KNEE TOTAL;  Surgeon: Angela Noel MD;  Location:  MAIN OR;  Service: Orthopedics    VA ARTHRP KNE CONDYLE&PLATU MEDIAL&LAT COMPARTMENTS Left 03/15/2023    Procedure: Left total knee arthroplasty;  Surgeon: Tony Cano DO;  [facility]: [facility];  Service: Orthopedics    VA COLONOSCOPY FLX DX W/COLLJ SPEC WHEN PFRMD N/A 2018    Procedure: COLONOSCOPY;  Surgeon: Poli Riojas III, MD;  [facility]: [facility];  Service: Gastroenterology    VA ESOPHAGOGASTRODUODENOSCOPY TRANSORAL DIAGNOSTIC N/A 2018    Procedure: ESOPHAGOGASTRODUODENOSCOPY (EGD);  Surgeon: Poli LEDBETTER  Shine MANUEL MD;  Location: [facility];  Service: Gastroenterology       Family History   Problem Relation Age of Onset    Diabetes Mother     Diabetes Father     Diabetes Sister     Cancer Sister     Diabetes Brother     No Known Problems Son     Thyroid disease Daughter     Diabetes Brother     No Known Problems Brother     No Known Problems Brother     No Known Problems Brother     No Known Problems Brother     No Known Problems Brother     Diabetes Sister     Cancer Sister     Diabetes Sister     Cancer Sister     No Known Problems Sister     No Known Problems Sister     No Known Problems Sister     No Known Problems Son     Breast cancer Neg Hx      I have reviewed and agree with the history as documented.    E-Cigarette/Vaping    E-Cigarette Use Never User      E-Cigarette/Vaping Substances    Nicotine No     THC No     CBD No     Flavoring No     Other No     Unknown No      Social History     Tobacco Use    Smoking status: Never    Smokeless tobacco: Never   Vaping Use    Vaping status: Never Used   Substance Use Topics    Alcohol use: Never    Drug use: No       Review of Systems   Constitutional:  Negative for chills and fever.   HENT:  Negative for congestion, ear pain and sore throat.    Eyes:  Negative for pain and visual disturbance.   Respiratory:  Negative for cough, shortness of breath and wheezing.    Cardiovascular:  Negative for chest pain and leg swelling.   Gastrointestinal:  Negative for abdominal pain, diarrhea, nausea and vomiting.   Genitourinary:  Positive for flank pain. Negative for dysuria, frequency, hematuria and urgency.   Musculoskeletal:  Negative for neck pain and neck stiffness.   Skin:  Negative for rash and wound.   Neurological:  Negative for weakness, numbness and headaches.   Psychiatric/Behavioral:  Negative for agitation and confusion.    All other systems reviewed and are negative.      Physical Exam  Physical Exam  Vitals and nursing note reviewed.   Constitutional:        Appearance: She is well-developed.   HENT:      Head: Normocephalic and atraumatic.   Eyes:      Pupils: Pupils are equal, round, and reactive to light.   Cardiovascular:      Rate and Rhythm: Normal rate and regular rhythm.   Pulmonary:      Effort: Pulmonary effort is normal.      Breath sounds: Normal breath sounds.   Abdominal:      General: Bowel sounds are normal.      Palpations: Abdomen is soft.      Tenderness: There is abdominal tenderness in the right lower quadrant. There is right CVA tenderness.   Musculoskeletal:         General: Normal range of motion.      Cervical back: Normal range of motion and neck supple.   Skin:     General: Skin is warm and dry.   Neurological:      General: No focal deficit present.      Mental Status: She is alert and oriented to person, place, and time.      Comments: No focal deficits         Vital Signs  ED Triage Vitals   Temperature Pulse Respirations Blood Pressure SpO2   07/09/24 1525 07/09/24 1525 07/09/24 1525 07/09/24 1525 07/09/24 1525   97.9 °F (36.6 °C) 66 16 120/66 95 %      Temp Source Heart Rate Source Patient Position - Orthostatic VS BP Location FiO2 (%)   07/09/24 1525 07/09/24 1525 07/09/24 1525 07/09/24 1525 --   Oral Monitor Sitting Left arm       Pain Score       07/09/24 1712       8           Vitals:    07/09/24 1525   BP: 120/66   Pulse: 66   Patient Position - Orthostatic VS: Sitting         Visual Acuity      ED Medications  Medications   sodium chloride 0.9 % bolus 1,000 mL (1,000 mL Intravenous New Bag 7/9/24 1712)   ketorolac (TORADOL) injection 15 mg (15 mg Intravenous Given 7/9/24 1712)   iohexol (OMNIPAQUE) 350 MG/ML injection (MULTI-DOSE) 100 mL (100 mL Intravenous Given 7/9/24 1800)       Diagnostic Studies  Results Reviewed       Procedure Component Value Units Date/Time    CMP [[id number]]  (Abnormal) Collected: 07/09/24 1714    Lab Status: Final result Specimen: Blood from Arm, Left Updated: 07/09/24 1739     Sodium 138 mmol/L       Potassium 4.2 mmol/L      Chloride 107 mmol/L      CO2 25 mmol/L      ANION GAP 6 mmol/L      BUN 29 mg/dL      Creatinine 0.87 mg/dL      Glucose 94 mg/dL      Calcium 9.7 mg/dL      AST 18 U/L      ALT 11 U/L      Alkaline Phosphatase 67 U/L      Total Protein 6.9 g/dL      Albumin 4.2 g/dL      Total Bilirubin 0.38 mg/dL      eGFR 68 ml/min/1.73sq m     Narrative:      National Kidney Disease Foundation guidelines for Chronic Kidney Disease (CKD):     Stage 1 with normal or high GFR (GFR > 90 mL/min/1.73 square meters)    Stage 2 Mild CKD (GFR = 60-89 mL/min/1.73 square meters)    Stage 3A Moderate CKD (GFR = 45-59 mL/min/1.73 square meters)    Stage 3B Moderate CKD (GFR = 30-44 mL/min/1.73 square meters)    Stage 4 Severe CKD (GFR = 15-29 mL/min/1.73 square meters)    Stage 5 End Stage CKD (GFR <15 mL/min/1.73 square meters)  Note: GFR calculation is accurate only with a steady state creatinine    Lipase [[id number]]  (Normal) Collected: 07/09/24 1714    Lab Status: Final result Specimen: Blood from Arm, Left Updated: 07/09/24 1739     Lipase 13 u/L     Lactic acid, plasma (w/reflex if result > 2.0) [[id number]]  (Normal) Collected: 07/09/24 1714    Lab Status: Final result Specimen: Blood from Arm, Left Updated: 07/09/24 1738     LACTIC ACID 0.7 mmol/L     Narrative:      Result may be elevated if tourniquet was used during collection.    UA w Reflex to Microscopic w Reflex to Culture [[id number]] Collected: 07/09/24 1715    Lab Status: Final result Specimen: Urine, Other Updated: 07/09/24 1723     Color, UA Light Yellow     Clarity, UA Clear     Specific Gravity, UA 1.022     pH, UA 5.0     Leukocytes, UA Negative     Nitrite, UA Negative     Protein, UA Negative mg/dl      Glucose, UA Negative mg/dl      Ketones, UA Negative mg/dl      Urobilinogen, UA <2.0 mg/dl      Bilirubin, UA Negative     Occult Blood, UA Negative    CBC and differential [[id number]]  (Abnormal) Collected: 07/09/24 1714    Lab Status: Final  result Specimen: Blood from Arm, Left Updated: 07/09/24 1721     WBC 4.33 Thousand/uL      RBC 3.59 Million/uL      Hemoglobin 10.8 g/dL      Hematocrit 31.9 %      MCV 89 fL      MCH 30.1 pg      MCHC 33.9 g/dL      RDW 13.8 %      MPV 9.5 fL      Platelets 208 Thousands/uL      nRBC 0 /100 WBCs      Segmented % 60 %      Immature Grans % 0 %      Lymphocytes % 28 %      Monocytes % 9 %      Eosinophils Relative 2 %      Basophils Relative 1 %      Absolute Neutrophils 2.62 Thousands/µL      Absolute Immature Grans 0.01 Thousand/uL      Absolute Lymphocytes 1.19 Thousands/µL      Absolute Monocytes 0.39 Thousand/µL      Eosinophils Absolute 0.10 Thousand/µL      Basophils Absolute 0.02 Thousands/µL                    CT abdomen pelvis with contrast   Final Result by Goyo Avila MD (07/09 1847)      No acute findings in the abdomen or pelvis.   Colonic diverticulosis without evidence of acute diverticulitis.      Workstation performed: [id number]                    Procedures  Procedures         ED Course                                               Medical Decision Making  70 y/o female with abd pain- will get labs, UA, and ct abd/pel. Will give toradol and reassess.     Amount and/or Complexity of Data Reviewed  Labs: ordered.  Radiology: ordered.    Risk  Prescription drug management.                 Disposition  Final diagnoses:   Right flank pain     Time reflects when diagnosis was documented in both MDM as applicable and the Disposition within this note       Time User Action Codes Description Comment    7/9/2024  6:54 PM Mica King Add [R10.9] Right flank pain           ED Disposition       ED Disposition   Discharge    Condition   Stable    Date/Time   Tue Jul 9, 2024  6:54 PM    Comment   Chelsy Alford discharge to home/self care.                   Follow-up Information       Follow up With Specialties Details Why Contact Info Additional Information    Jose Camacoh MD Internal Medicine  Call in 1 day for follow up within 2-3 days [id number]  [facility] [address]  [phone]       [facility] Emergency Medicine Go to  immediately for any new or worsening symptoms [address] [facility] [id number] [phone] [facility], [address] [place], [place], [address]            Patient's Medications   Discharge Prescriptions    METHOCARBAMOL (ROBAXIN) 500 MG TABLET    Take 1 tablet (500 mg total) by mouth 2 (two) times a day       Start Date: 7/9/2024  End Date: --       Order Dose: 500 mg       Quantity: 10 tablet    Refills: 0    NAPROXEN (NAPROSYN) 500 MG TABLET    Take 1 tablet (500 mg total) by mouth 2 (two) times a day with meals       Start Date: 7/9/2024  End Date: --       Order Dose: 500 mg       Quantity: 30 tablet    Refills: 0       No discharge procedures on file.    PDMP Review         Value Time User    PDMP Reviewed  Yes 11/6/2023 11:49 AM Jose Camacho MD            ED Provider  Electronically Signed by             Mica King DO  07/09/24 8491

## 2024-07-09 NOTE — ED NOTES
Patient ambulated out of the ED, AAOx4 resp even and unlabored with no S$S of distress.       Ovidio Mata RN  07/09/24 1924

## 2024-07-16 ENCOUNTER — NURSE TRIAGE (OUTPATIENT)
Age: 69
End: 2024-07-16

## 2024-07-16 NOTE — TELEPHONE ENCOUNTER
"Patient was instructed to report for procedures tomorrow as scheduled. She states she will not take iron supplement today.       Answer Assessment - Initial Assessment Questions  1. REASON FOR CALL or QUESTION: \"What is your reason for calling today?\" or \"How can I best help you?\" or \"What question do you have that I can help answer?\"      Preprocedure question regarding iron supplements.    Protocols used: Information Only Call - No Triage-ADULT-[place]    "

## 2024-07-16 NOTE — TELEPHONE ENCOUNTER
Regarding: Iron Supp  ----- Message from Anuradha LUCIANO sent at 7/16/2024  9:04 AM EDT -----  Patient called in she never stopped her Iron Supp she is regine for procedure tomorrow

## 2024-07-17 ENCOUNTER — ANESTHESIA (OUTPATIENT)
Dept: GASTROENTEROLOGY | Facility: HOSPITAL | Age: 69
End: 2024-07-17

## 2024-07-17 ENCOUNTER — ANESTHESIA EVENT (OUTPATIENT)
Dept: GASTROENTEROLOGY | Facility: HOSPITAL | Age: 69
End: 2024-07-17

## 2024-07-17 ENCOUNTER — HOSPITAL ENCOUNTER (OUTPATIENT)
Dept: GASTROENTEROLOGY | Facility: HOSPITAL | Age: 69
Setting detail: OUTPATIENT SURGERY
Discharge: HOME | End: 2024-07-17
Payer: COMMERCIAL

## 2024-07-17 VITALS
TEMPERATURE: 97.9 F | RESPIRATION RATE: 20 BRPM | OXYGEN SATURATION: 96 % | BODY MASS INDEX: 33.63 KG/M2 | WEIGHT: 182.76 LBS | HEIGHT: 62 IN | HEART RATE: 59 BPM | SYSTOLIC BLOOD PRESSURE: 158 MMHG | DIASTOLIC BLOOD PRESSURE: 89 MMHG

## 2024-07-17 DIAGNOSIS — K57.92 DIVERTICULITIS: ICD-10-CM

## 2024-07-17 DIAGNOSIS — K31.819 GAVE (GASTRIC ANTRAL VASCULAR ECTASIA): ICD-10-CM

## 2024-07-17 DIAGNOSIS — D50.9 IRON DEFICIENCY ANEMIA, UNSPECIFIED IRON DEFICIENCY ANEMIA TYPE: ICD-10-CM

## 2024-07-17 PROCEDURE — 45388 COLONOSCOPY W/ABLATION: CPT | Performed by: INTERNAL MEDICINE

## 2024-07-17 PROCEDURE — 43270 EGD LESION ABLATION: CPT | Performed by: INTERNAL MEDICINE

## 2024-07-17 PROCEDURE — 43239 EGD BIOPSY SINGLE/MULTIPLE: CPT | Performed by: INTERNAL MEDICINE

## 2024-07-17 PROCEDURE — 88305 TISSUE EXAM BY PATHOLOGIST: CPT | Performed by: STUDENT IN AN ORGANIZED HEALTH CARE EDUCATION/TRAINING PROGRAM

## 2024-07-17 RX ORDER — PROPOFOL 10 MG/ML
INJECTION, EMULSION INTRAVENOUS AS NEEDED
Status: DISCONTINUED | OUTPATIENT
Start: 2024-07-17 | End: 2024-07-17

## 2024-07-17 RX ORDER — SODIUM CHLORIDE, SODIUM LACTATE, POTASSIUM CHLORIDE, CALCIUM CHLORIDE 600; 310; 30; 20 MG/100ML; MG/100ML; MG/100ML; MG/100ML
INJECTION, SOLUTION INTRAVENOUS CONTINUOUS PRN
Status: DISCONTINUED | OUTPATIENT
Start: 2024-07-17 | End: 2024-07-17

## 2024-07-17 RX ORDER — LIDOCAINE HYDROCHLORIDE 20 MG/ML
INJECTION, SOLUTION EPIDURAL; INFILTRATION; INTRACAUDAL; PERINEURAL AS NEEDED
Status: DISCONTINUED | OUTPATIENT
Start: 2024-07-17 | End: 2024-07-17

## 2024-07-17 RX ADMIN — PROPOFOL 20 MG: 10 INJECTION, EMULSION INTRAVENOUS at 12:09

## 2024-07-17 RX ADMIN — LIDOCAINE HYDROCHLORIDE 100 MG: 20 INJECTION, SOLUTION EPIDURAL; INFILTRATION; INTRACAUDAL; PERINEURAL at 12:03

## 2024-07-17 RX ADMIN — PROPOFOL 40 MG: 10 INJECTION, EMULSION INTRAVENOUS at 12:06

## 2024-07-17 RX ADMIN — PROPOFOL 20 MG: 10 INJECTION, EMULSION INTRAVENOUS at 12:13

## 2024-07-17 RX ADMIN — PROPOFOL 50 MG: 10 INJECTION, EMULSION INTRAVENOUS at 12:19

## 2024-07-17 RX ADMIN — PROPOFOL 50 MG: 10 INJECTION, EMULSION INTRAVENOUS at 12:15

## 2024-07-17 RX ADMIN — SODIUM CHLORIDE, SODIUM LACTATE, POTASSIUM CHLORIDE, AND CALCIUM CHLORIDE: .6; .31; .03; .02 INJECTION, SOLUTION INTRAVENOUS at 11:56

## 2024-07-17 RX ADMIN — PROPOFOL 120 MG: 10 INJECTION, EMULSION INTRAVENOUS at 12:03

## 2024-07-17 NOTE — ANESTHESIA POSTPROCEDURE EVALUATION
Post-Op Assessment Note    CV Status:  Stable    Pain management: adequate       Mental Status:  Alert and awake   Hydration Status:  Euvolemic   PONV Controlled:  Controlled   Airway Patency:  Patent     Post Op Vitals Reviewed: Yes    No anethesia notable event occurred.    Staff: MAURIZIO               /79 (07/17/24 1228)    Temp 97.9 °F (36.6 °C) (07/17/24 1228)    Pulse 65 (07/17/24 1228)   Resp 18 (07/17/24 1228)    SpO2 94 % (07/17/24 1228)

## 2024-07-17 NOTE — ANESTHESIA PREPROCEDURE EVALUATION
Procedure:  EGD  COLONOSCOPY    Obesity BMI 32  Metoprolol, nifedipine and norvasc  today  Hx of breast cancer s/p LN dissection on the right  NPO approp  Left Ventricle Left ventricular cavity size is normal. Wall thickness is mildly increased. There is mild concentric hypertrophy. The left ventricular ejection fraction is 60%. Systolic function is normal.  Wall motion is normal. Diastolic function is normal.   Right Ventricle Right ventricular cavity size is normal. Systolic function is normal. Wall thickness is normal.   Left Atrium The atrium is mildly dilated.   Right Atrium The atrium is normal in size.   Aortic Valve The aortic valve is trileaflet. The leaflets are mildly thickened. The leaflets are not calcified. The leaflets exhibit normal mobility. There is no evidence of regurgitation. The aortic valve has no significant stenosis.   Mitral Valve Mitral valve structure is normal. There is trace regurgitation. There is no evidence of stenosis.   Tricuspid Valve Tricuspid valve structure is normal. There is trace regurgitation. There is no evidence of stenosis.   Pulmonic Valve Pulmonic valve structure is normal. There is no evidence of regurgitation. There is no evidence of stenosis.   Ascending Aorta The aortic root is normal in size.   IVC/SVC The inferior vena cava is normal in size.   Pericardium There is no pericardial effusion. The pericardium is normal in appearance.     Relevant Problems   CARDIO   (+) Hypertension, benign   (+) Mixed hyperlipidemia   (+) Murmur, heart      GI/HEPATIC   (+) Gastroesophageal reflux disease      HEMATOLOGY   (+) Iron deficiency anemia      MUSCULOSKELETAL   (+) Primary osteoarthritis of left knee      NEURO/PSYCH   (+) Mild episode of recurrent major depressive disorder (HCC)        Physical Exam    Airway    Mallampati score: III  TM Distance: >3 FB  Neck ROM: full     Dental   No notable dental hx     Cardiovascular  Cardiovascular exam  normal    Pulmonary  Pulmonary exam normal     Other Findings        Anesthesia Plan  ASA Score- 2     Anesthesia Type- IV sedation with anesthesia with ASA Monitors.         Additional Monitors:     Airway Plan:            Plan Factors-    Chart reviewed.   Existing labs reviewed. Patient summary reviewed.    Patient is not a current smoker.              Induction- intravenous.    Postoperative Plan-         Informed Consent- Anesthetic plan and risks discussed with patient.  I personally reviewed this patient with the CRNA. Discussed and agreed on the Anesthesia Plan with the CRNA..

## 2024-07-17 NOTE — INTERVAL H&P NOTE
H&P reviewed. After examining the patient I find no changes in the patients condition since the H&P had been written.    Vitals:    07/17/24 1049   BP: 119/75   Pulse: 62   Resp: 18   Temp: 97.7 °F (36.5 °C)   SpO2: 96%

## 2024-07-18 ENCOUNTER — TELEPHONE (OUTPATIENT)
Age: 69
End: 2024-07-18

## 2024-07-18 DIAGNOSIS — K21.9 GASTROESOPHAGEAL REFLUX DISEASE WITHOUT ESOPHAGITIS: Primary | ICD-10-CM

## 2024-07-18 RX ORDER — ONDANSETRON 4 MG/1
4 TABLET, FILM COATED ORAL EVERY 8 HOURS PRN
Qty: 20 TABLET | Refills: 0 | Status: SHIPPED | OUTPATIENT
Start: 2024-07-18

## 2024-07-18 NOTE — TELEPHONE ENCOUNTER
Patients GI provider:  Dr. Riojas     Number to return call: ( [phone]     Reason for call: Pt calling in feeling Nauseated had procedures yesterday she would like something called in to the Pharmacy to help with Nausea     Scheduled procedure/appointment date if applicable: Apt/procedure 7/17

## 2024-07-19 ENCOUNTER — OFFICE VISIT (OUTPATIENT)
Dept: INTERNAL MEDICINE CLINIC | Facility: CLINIC | Age: 69
End: 2024-07-19
Payer: COMMERCIAL

## 2024-07-19 VITALS
BODY MASS INDEX: 34.27 KG/M2 | WEIGHT: 186.2 LBS | DIASTOLIC BLOOD PRESSURE: 68 MMHG | HEIGHT: 62 IN | OXYGEN SATURATION: 96 % | RESPIRATION RATE: 18 BRPM | TEMPERATURE: 98.6 F | HEART RATE: 60 BPM | SYSTOLIC BLOOD PRESSURE: 118 MMHG

## 2024-07-19 DIAGNOSIS — Z00.00 MEDICARE ANNUAL WELLNESS VISIT, SUBSEQUENT: Primary | ICD-10-CM

## 2024-07-19 DIAGNOSIS — E78.2 MIXED HYPERLIPIDEMIA: ICD-10-CM

## 2024-07-19 DIAGNOSIS — Z12.4 SCREENING FOR CERVICAL CANCER: ICD-10-CM

## 2024-07-19 DIAGNOSIS — F33.0 MILD EPISODE OF RECURRENT MAJOR DEPRESSIVE DISORDER (HCC): ICD-10-CM

## 2024-07-19 DIAGNOSIS — R73.01 IMPAIRED FASTING GLUCOSE: ICD-10-CM

## 2024-07-19 DIAGNOSIS — Z23 ENCOUNTER FOR IMMUNIZATION: ICD-10-CM

## 2024-07-19 DIAGNOSIS — I10 HYPERTENSION, BENIGN: ICD-10-CM

## 2024-07-19 PROCEDURE — G0009 ADMIN PNEUMOCOCCAL VACCINE: HCPCS | Performed by: INTERNAL MEDICINE

## 2024-07-19 PROCEDURE — G0439 PPPS, SUBSEQ VISIT: HCPCS | Performed by: INTERNAL MEDICINE

## 2024-07-19 PROCEDURE — 90677 PCV20 VACCINE IM: CPT | Performed by: INTERNAL MEDICINE

## 2024-07-19 PROCEDURE — 99214 OFFICE O/P EST MOD 30 MIN: CPT | Performed by: INTERNAL MEDICINE

## 2024-07-19 RX ORDER — SERTRALINE HYDROCHLORIDE 100 MG/1
100 TABLET, FILM COATED ORAL DAILY
Qty: 30 TABLET | Refills: 11 | Status: SHIPPED | OUTPATIENT
Start: 2024-07-19

## 2024-07-19 NOTE — PROGRESS NOTES
Ambulatory Visit  Name: Chelsy Alford      : 1955      MRN: [id number]  Encounter Provider: Jose Camacho MD  Encounter Date: 2024   Encounter department: [facility] INTERNAL MEDICINE [place]    Assessment & Plan   1. Medicare annual wellness visit, subsequent  2. Hypertension, benign  Assessment & Plan:  Controlled.  Continue current medicines.  Orders:  -     Comprehensive metabolic panel; Future  -     CBC and differential; Future  3. Impaired fasting glucose  Assessment & Plan:  Continue to watch diet.  Recheck.  Orders:  -     Hemoglobin A1C; Future  4. Mixed hyperlipidemia  Assessment & Plan:  Has been controlled but due for labs.    Orders:  -     Lipid panel; Future  5. Mild episode of recurrent major depressive disorder (HCC)  Assessment & Plan:  PHQ was positive and she would be willing to increase her zoloft.  Orders:  -     sertraline (ZOLOFT) 100 mg tablet; Take 1 tablet (100 mg total) by mouth daily  6. Encounter for immunization  -     Pneumococcal Conjugate Vaccine 20-valent (Pcv20)  7. Screening for cervical cancer  -     Ambulatory referral to Obstetrics / Gynecology; Future       Preventive health issues were discussed with patient, and age appropriate screening tests were ordered as noted in patient's After Visit Summary. Personalized health advice and appropriate referrals for health education or preventive services given if needed, as noted in patient's After Visit Summary.    History of Present Illness     Here for routine followup and Medicare Wellness.  No labs done.  Following with GI lately for bouts of diverticulitis.  Just had EGD/colonoscopy.  Her BP is OK.  Cholesterol and sugar have been OK but due for labs.  Followup depression screen was positive.       Patient Care Team:  Jose Camacho MD as PCP - General  Jose Camacho MD as PCP - PCP-[facility] ([facility])  MIGUEL Yip MD Faith E Burns, PA-C (Gastroenterology)  Poli Riojas  MD KALEB as Endoscopist    Review of Systems   Respiratory:  Negative for shortness of breath.    Cardiovascular:  Negative for chest pain.   Gastrointestinal:  Negative for abdominal pain.     Medical History Reviewed by provider this encounter:       Annual Wellness Visit Questionnaire   Last Medicare Wellness visit information reviewed, patient interviewed and updates made to the record today.      Health Risk Assessment:   Patient rates overall health as good. Patient feels that their physical health rating is same. Patient is satisfied with their life. Eyesight was rated as same. Hearing was rated as same. Patient feels that their emotional and mental health rating is same. Patients states they are never, rarely angry. Patient states they are often unusually tired/fatigued. Pain experienced in the last 7 days has been a lot. Patient's pain rating has been 7/10. Patient states that she has experienced no weight loss or gain in last 6 months.     Depression Screening:   PHQ-9 Score: 8      Fall Risk Screening:   In the past year, patient has experienced: history of falling in past year    Number of falls: 2 or more  Injured during fall?: No    Feels unsteady when standing or walking?: No    Worried about falling?: No      Urinary Incontinence Screening:   Patient has not leaked urine accidently in the last six months.     Home Safety:  Patient does not have trouble with stairs inside or outside of their home. Patient has working smoke alarms and has working carbon monoxide detector. Home safety hazards include: none.     Nutrition:   Current diet is Regular.     Medications:   Patient is not currently taking any over-the-counter supplements. Patient is able to manage medications.     Activities of Daily Living (ADLs)/Instrumental Activities of Daily Living (IADLs):   Walk and transfer into and out of bed and chair?: Yes  Dress and groom yourself?: Yes    Bathe or shower yourself?: Yes    Feed yourself? Yes  Do  your laundry/housekeeping?: Yes  Manage your money, pay your bills and track your expenses?: Yes  Make your own meals?: Yes    Do your own shopping?: Yes    Previous Hospitalizations:   Any hospitalizations or ED visits within the last 12 months?: No      Advance Care Planning:   Living will: No    Durable POA for healthcare: No    Advanced directive: No    Advanced directive counseling given: Yes      Cognitive Screening:   Provider or family/friend/caregiver concerned regarding cognition?: No    PREVENTIVE SCREENINGS      Cardiovascular Screening:    General: Screening Not Indicated and History Lipid Disorder      Diabetes Screening:     General: Screening Current      Colorectal Cancer Screening:     General: Screening Current      Breast Cancer Screening:     General: History Breast Cancer      Cervical Cancer Screening:    General: Screening Not Indicated      Osteoporosis Screening:    General: Screening Current      Abdominal Aortic Aneurysm (AAA) Screening:        General: Screening Not Indicated      Lung Cancer Screening:     General: Screening Not Indicated      Hepatitis C Screening:    General: Screening Current    Screening, Brief Intervention, and Referral to Treatment (SBIRT)    Screening  Typical number of drinks in a day: 0  Typical number of drinks in a week: 0  Interpretation: Low risk drinking behavior.    AUDIT-C Screenin) How often did you have a drink containing alcohol in the past year? never  2) How many drinks did you have on a typical day when you were drinking in the past year? 0  3) How often did you have 6 or more drinks on one occasion in the past year? never    AUDIT-C Score: 0  Interpretation: Score 0-2 (female): Negative screen for alcohol misuse    Single Item Drug Screening:  How often have you used an illegal drug (including marijuana) or a prescription medication for non-medical reasons in the past year? never    Single Item Drug Screen Score: 0  Interpretation: Negative  "screen for possible drug use disorder    Brief Intervention  Alcohol & drug use screenings were reviewed. No concerns regarding substance use disorder identified.     Social Determinants of Health     Financial Resource Strain: Low Risk  (7/5/2023)    Overall Financial Resource Strain (CARDIA)    • Difficulty of Paying Living Expenses: Not hard at all   Food Insecurity: No Food Insecurity (7/19/2024)    Hunger Vital Sign    • Worried About Running Out of Food in the Last Year: Never true    • Ran Out of Food in the Last Year: Never true   Transportation Needs: No Transportation Needs (7/19/2024)    PRAPARE - Transportation    • Lack of Transportation (Medical): No    • Lack of Transportation (Non-Medical): No   Housing Stability: Low Risk  (7/19/2024)    Housing Stability Vital Sign    • Unable to Pay for Housing in the Last Year: No    • Number of Times Moved in the Last Year: 1    • Homeless in the Last Year: No   Utilities: Not At Risk (7/19/2024)    [facility] Utilities    • Threatened with loss of utilities: No     No results found.    Objective     /68 (BP Location: Left arm, Patient Position: Sitting, Cuff Size: Standard)   Pulse 60   Temp 98.6 °F (37 °C) (Tympanic)   Resp 18   Ht 5' 2\" (1.575 m)   Wt 84.5 kg (186 lb 3.2 oz)   SpO2 96%   BMI 34.06 kg/m²     Physical Exam  Vitals and nursing note reviewed.   Constitutional:       Appearance: Normal appearance. She is well-developed.   Cardiovascular:      Rate and Rhythm: Normal rate and regular rhythm.      Heart sounds: Normal heart sounds.   Pulmonary:      Effort: Pulmonary effort is normal.      Breath sounds: Normal breath sounds.   Abdominal:      Palpations: Abdomen is soft.      Tenderness: There is no abdominal tenderness.   Neurological:      Mental Status: She is alert and oriented to person, place, and time.           "

## 2024-07-19 NOTE — PATIENT INSTRUCTIONS
Medicare Preventive Visit Patient Instructions  Thank you for completing your Welcome to Medicare Visit or Medicare Annual Wellness Visit today. Your next wellness visit will be due in one year (7/20/2025).  The screening/preventive services that you may require over the next 5-10 years are detailed below. Some tests may not apply to you based off risk factors and/or age. Screening tests ordered at today's visit but not completed yet may show as past due. Also, please note that scanned in results may not display below.  Preventive Screenings:  Service Recommendations Previous Testing/Comments   Colorectal Cancer Screening  * Colonoscopy    * Fecal Occult Blood Test (FOBT)/Fecal Immunochemical Test (FIT)  * Fecal DNA/Cologuard Test  * Flexible Sigmoidoscopy Age: 45-75 years old   Colonoscopy: every 10 years (may be performed more frequently if at higher risk)  OR  FOBT/FIT: every 1 year  OR  Cologuard: every 3 years  OR  Sigmoidoscopy: every 5 years  Screening may be recommended earlier than age 45 if at higher risk for colorectal cancer. Also, an individualized decision between you and your healthcare provider will decide whether screening between the ages of 76-85 would be appropriate. Colonoscopy: 07/17/2024  FOBT/FIT: Not on file  Cologuard: Not on file  Sigmoidoscopy: Not on file    Screening Current     Breast Cancer Screening Age: 40+ years old  Frequency: every 1-2 years  Not required if history of left and right mastectomy Mammogram: 01/09/2024    History Breast Cancer   Cervical Cancer Screening Between the ages of 21-29, pap smear recommended once every 3 years.   Between the ages of 30-65, can perform pap smear with HPV co-testing every 5 years.   Recommendations may differ for women with a history of total hysterectomy, cervical cancer, or abnormal pap smears in past. Pap Smear: 08/19/2015    Screening Not Indicated   Hepatitis C Screening Once for adults born between 1945 and 1965  More frequently in  patients at high risk for Hepatitis C Hep C Antibody: 03/12/2019    Screening Current   Diabetes Screening 1-2 times per year if you're at risk for diabetes or have pre-diabetes Fasting glucose: 90 mg/dL (5/16/2024)  A1C: 5.7 % (2/21/2024)  Screening Current   Cholesterol Screening Once every 5 years if you don't have a lipid disorder. May order more often based on risk factors. Lipid panel: 05/13/2024    Screening Not Indicated  History Lipid Disorder     Other Preventive Screenings Covered by Medicare:  Abdominal Aortic Aneurysm (AAA) Screening: covered once if your at risk. You're considered to be at risk if you have a family history of AAA.  Lung Cancer Screening: covers low dose CT scan once per year if you meet all of the following conditions: (1) Age 55-77; (2) No signs or symptoms of lung cancer; (3) Current smoker or have quit smoking within the last 15 years; (4) You have a tobacco smoking history of at least 20 pack years (packs per day multiplied by number of years you smoked); (5) You get a written order from a healthcare provider.  Glaucoma Screening: covered annually if you're considered high risk: (1) You have diabetes OR (2) Family history of glaucoma OR (3)  aged 50 and older OR (4)  American aged 65 and older  Osteoporosis Screening: covered every 2 years if you meet one of the following conditions: (1) You're estrogen deficient and at risk for osteoporosis based off medical history and other findings; (2) Have a vertebral abnormality; (3) On glucocorticoid therapy for more than 3 months; (4) Have primary hyperparathyroidism; (5) On osteoporosis medications and need to assess response to drug therapy.   Last bone density test (DXA Scan): 01/19/2022.  HIV Screening: covered annually if you're between the age of 15-65. Also covered annually if you are younger than 15 and older than 65 with risk factors for HIV infection. For pregnant patients, it is covered up to 3 times per  pregnancy.    Immunizations:  Immunization Recommendations   Influenza Vaccine Annual influenza vaccination during flu season is recommended for all persons aged >= 6 months who do not have contraindications   Pneumococcal Vaccine   * Pneumococcal conjugate vaccine = PCV13 (Prevnar 13), PCV15 (Vaxneuvance), PCV20 (Prevnar 20)  * Pneumococcal polysaccharide vaccine = PPSV23 (Pneumovax) Adults 19-63 yo with certain risk factors or if 65+ yo  If never received any pneumonia vaccine: recommend Prevnar 20 (PCV20)  Give PCV20 if previously received 1 dose of PCV13 or PPSV23   Hepatitis B Vaccine 3 dose series if at intermediate or high risk (ex: diabetes, end stage renal disease, liver disease)   Respiratory syncytial virus (RSV) Vaccine - COVERED BY MEDICARE PART D  * RSVPreF3 (Arexvy) CDC recommends that adults 60 years of age and older may receive a single dose of RSV vaccine using shared clinical decision-making (SCDM)   Tetanus (Td) Vaccine - COST NOT COVERED BY MEDICARE PART B Following completion of primary series, a booster dose should be given every 10 years to maintain immunity against tetanus. Td may also be given as tetanus wound prophylaxis.   Tdap Vaccine - COST NOT COVERED BY MEDICARE PART B Recommended at least once for all adults. For pregnant patients, recommended with each pregnancy.   Shingles Vaccine (Shingrix) - COST NOT COVERED BY MEDICARE PART B  2 shot series recommended in those 19 years and older who have or will have weakened immune systems or those 50 years and older     Health Maintenance Due:      Topic Date Due   • Cervical Cancer Screening  08/19/2020   • Breast Cancer Screening: Mammogram  01/09/2025   • DXA SCAN  01/19/2027   • Colorectal Cancer Screening  07/15/2034   • Hepatitis C Screening  Completed     Immunizations Due:      Topic Date Due   • Pneumococcal Vaccine: 65+ Years (2 of 2 - PCV) 04/06/2020   • COVID-19 Vaccine (4 - 2023-24 season) 09/01/2023   • Influenza Vaccine (1)  09/01/2024     Advance Directives   What are advance directives?  Advance directives are legal documents that state your wishes and plans for medical care. These plans are made ahead of time in case you lose your ability to make decisions for yourself. Advance directives can apply to any medical decision, such as the treatments you want, and if you want to donate organs.   What are the types of advance directives?  There are many types of advance directives, and each state has rules about how to use them. You may choose a combination of any of the following:  Living will:  This is a written record of the treatment you want. You can also choose which treatments you do not want, which to limit, and which to stop at a certain time. This includes surgery, medicine, IV fluid, and tube feedings.   Durable power of  for healthcare (DPAHC):  This is a written record that states who you want to make healthcare choices for you when you are unable to make them for yourself. This person, called a proxy, is usually a family member or a friend. You may choose more than 1 proxy.  Do not resuscitate (DNR) order:  A DNR order is used in case your heart stops beating or you stop breathing. It is a request not to have certain forms of treatment, such as CPR. A DNR order may be included in other types of advance directives.  Medical directive:  This covers the care that you want if you are in a coma, near death, or unable to make decisions for yourself. You can list the treatments you want for each condition. Treatment may include pain medicine, surgery, blood transfusions, dialysis, IV or tube feedings, and a ventilator (breathing machine).  Values history:  This document has questions about your views, beliefs, and how you feel and think about life. This information can help others choose the care that you would choose.  Why are advance directives important?  An advance directive helps you control your care. Although spoken  wishes may be used, it is better to have your wishes written down. Spoken wishes can be misunderstood, or not followed. Treatments may be given even if you do not want them. An advance directive may make it easier for your family to make difficult choices about your care.   Fall Prevention    Fall prevention  includes ways to make your home and other areas safer. It also includes ways you can move more carefully to prevent a fall. Health conditions that cause changes in your blood pressure, vision, or muscle strength and coordination may increase your risk for falls. Medicines may also increase your risk for falls if they make you dizzy, weak, or sleepy.   Fall prevention tips:   Stand or sit up slowly.    Use assistive devices as directed.    Wear shoes that fit well and have soles that .    Wear a personal alarm.    Stay active.    Manage your medical conditions.    Home Safety Tips:  Add items to prevent falls in the bathroom.    Keep paths clear.    Install bright lights in your home.    Keep items you use often on shelves within reach.    Paint or place reflective tape on the edges of your stairs.    Weight Management   Why it is important to manage your weight:  Being overweight increases your risk of health conditions such as heart disease, high blood pressure, type 2 diabetes, and certain types of cancer. It can also increase your risk for osteoarthritis, sleep apnea, and other respiratory problems. Aim for a slow, steady weight loss. Even a small amount of weight loss can lower your risk of health problems.  How to lose weight safely:  A safe and healthy way to lose weight is to eat fewer calories and get regular exercise. You can lose up about 1 pound a week by decreasing the number of calories you eat by 500 calories each day.   Healthy meal plan for weight management:  A healthy meal plan includes a variety of foods, contains fewer calories, and helps you stay healthy. A healthy meal plan includes  the following:  Eat whole-grain foods more often.  A healthy meal plan should contain fiber. Fiber is the part of grains, fruits, and vegetables that is not broken down by your body. Whole-grain foods are healthy and provide extra fiber in your diet. Some examples of whole-grain foods are whole-wheat breads and pastas, oatmeal, brown rice, and bulgur.  Eat a variety of vegetables every day.  Include dark, leafy greens such as spinach, kale, cody greens, and mustard greens. Eat yellow and orange vegetables such as carrots, sweet potatoes, and winter squash.   Eat a variety of fruits every day.  Choose fresh or canned fruit (canned in its own juice or light syrup) instead of juice. Fruit juice has very little or no fiber.  Eat low-fat dairy foods.  Drink fat-free (skim) milk or 1% milk. Eat fat-free yogurt and low-fat cottage cheese. Try low-fat cheeses such as mozzarella and other reduced-fat cheeses.  Choose meat and other protein foods that are low in fat.  Choose beans or other legumes such as split peas or lentils. Choose fish, skinless poultry (chicken or turkey), or lean cuts of red meat (beef or pork). Before you cook meat or poultry, cut off any visible fat.   Use less fat and oil.  Try baking foods instead of frying them. Add less fat, such as margarine, sour cream, regular salad dressing and mayonnaise to foods. Eat fewer high-fat foods. Some examples of high-fat foods include french fries, doughnuts, ice cream, and cakes.  Eat fewer sweets.  Limit foods and drinks that are high in sugar. This includes candy, cookies, regular soda, and sweetened drinks.  Exercise:  Exercise at least 30 minutes per day on most days of the week. Some examples of exercise include walking, biking, dancing, and swimming. You can also fit in more physical activity by taking the stairs instead of the elevator or parking farther away from stores. Ask your healthcare provider about the best exercise plan for you.      © Copyright  [employer] 2018 Information is for End User's use only and may not be sold, redistributed or otherwise used for commercial purposes. All illustrations and images included in CareNotes® are the copyrighted property of A.[employer].A.M., Inc[employer] or [employer]

## 2024-07-20 DIAGNOSIS — I10 HYPERTENSION, BENIGN: ICD-10-CM

## 2024-07-20 RX ORDER — VALSARTAN 320 MG/1
320 TABLET ORAL DAILY
Qty: 90 TABLET | Refills: 1 | Status: SHIPPED | OUTPATIENT
Start: 2024-07-20

## 2024-07-22 ENCOUNTER — APPOINTMENT (OUTPATIENT)
Age: 69
End: 2024-07-22
Payer: COMMERCIAL

## 2024-07-22 DIAGNOSIS — E78.2 MIXED HYPERLIPIDEMIA: ICD-10-CM

## 2024-07-22 DIAGNOSIS — R73.01 IMPAIRED FASTING GLUCOSE: ICD-10-CM

## 2024-07-22 DIAGNOSIS — I10 HYPERTENSION, BENIGN: ICD-10-CM

## 2024-07-22 LAB
ALBUMIN SERPL BCG-MCNC: 3.7 G/DL (ref 3.5–5)
ALP SERPL-CCNC: 74 U/L (ref 34–104)
ALT SERPL W P-5'-P-CCNC: 12 U/L (ref 7–52)
ANION GAP SERPL CALCULATED.3IONS-SCNC: 10 MMOL/L (ref 4–13)
AST SERPL W P-5'-P-CCNC: 17 U/L (ref 13–39)
BASOPHILS # BLD AUTO: 0.03 THOUSANDS/ÂΜL (ref 0–0.1)
BASOPHILS NFR BLD AUTO: 1 % (ref 0–1)
BILIRUB SERPL-MCNC: 0.36 MG/DL (ref 0.2–1)
BUN SERPL-MCNC: 20 MG/DL (ref 5–25)
CALCIUM SERPL-MCNC: 9.2 MG/DL (ref 8.4–10.2)
CHLORIDE SERPL-SCNC: 105 MMOL/L (ref 96–108)
CHOLEST SERPL-MCNC: 191 MG/DL
CO2 SERPL-SCNC: 26 MMOL/L (ref 21–32)
CREAT SERPL-MCNC: 0.84 MG/DL (ref 0.6–1.3)
EOSINOPHIL # BLD AUTO: 0.17 THOUSAND/ÂΜL (ref 0–0.61)
EOSINOPHIL NFR BLD AUTO: 3 % (ref 0–6)
ERYTHROCYTE [DISTWIDTH] IN BLOOD BY AUTOMATED COUNT: 14.4 % (ref 11.6–15.1)
EST. AVERAGE GLUCOSE BLD GHB EST-MCNC: 120 MG/DL
GFR SERPL CREATININE-BSD FRML MDRD: 71 ML/MIN/1.73SQ M
GLUCOSE P FAST SERPL-MCNC: 153 MG/DL (ref 65–99)
HBA1C MFR BLD: 5.8 %
HCT VFR BLD AUTO: 33.5 % (ref 34.8–46.1)
HDLC SERPL-MCNC: 73 MG/DL
HGB BLD-MCNC: 11.2 G/DL (ref 11.5–15.4)
IMM GRANULOCYTES # BLD AUTO: 0.02 THOUSAND/UL (ref 0–0.2)
IMM GRANULOCYTES NFR BLD AUTO: 0 % (ref 0–2)
LDLC SERPL CALC-MCNC: 105 MG/DL (ref 0–100)
LYMPHOCYTES # BLD AUTO: 1.09 THOUSANDS/ÂΜL (ref 0.6–4.47)
LYMPHOCYTES NFR BLD AUTO: 18 % (ref 14–44)
MCH RBC QN AUTO: 30.4 PG (ref 26.8–34.3)
MCHC RBC AUTO-ENTMCNC: 33.4 G/DL (ref 31.4–37.4)
MCV RBC AUTO: 91 FL (ref 82–98)
MONOCYTES # BLD AUTO: 0.45 THOUSAND/ÂΜL (ref 0.17–1.22)
MONOCYTES NFR BLD AUTO: 7 % (ref 4–12)
NEUTROPHILS # BLD AUTO: 4.29 THOUSANDS/ÂΜL (ref 1.85–7.62)
NEUTS SEG NFR BLD AUTO: 71 % (ref 43–75)
NONHDLC SERPL-MCNC: 118 MG/DL
NRBC BLD AUTO-RTO: 0 /100 WBCS
PLATELET # BLD AUTO: 206 THOUSANDS/UL (ref 149–390)
PMV BLD AUTO: 9.7 FL (ref 8.9–12.7)
POTASSIUM SERPL-SCNC: 3.9 MMOL/L (ref 3.5–5.3)
PROT SERPL-MCNC: 6.7 G/DL (ref 6.4–8.4)
RBC # BLD AUTO: 3.68 MILLION/UL (ref 3.81–5.12)
SODIUM SERPL-SCNC: 141 MMOL/L (ref 135–147)
TRIGL SERPL-MCNC: 66 MG/DL
WBC # BLD AUTO: 6.05 THOUSAND/UL (ref 4.31–10.16)

## 2024-07-22 PROCEDURE — 80061 LIPID PANEL: CPT

## 2024-07-22 PROCEDURE — 85025 COMPLETE CBC W/AUTO DIFF WBC: CPT

## 2024-07-22 PROCEDURE — 83036 HEMOGLOBIN GLYCOSYLATED A1C: CPT

## 2024-07-22 PROCEDURE — 88305 TISSUE EXAM BY PATHOLOGIST: CPT | Performed by: STUDENT IN AN ORGANIZED HEALTH CARE EDUCATION/TRAINING PROGRAM

## 2024-07-22 PROCEDURE — 36415 COLL VENOUS BLD VENIPUNCTURE: CPT

## 2024-07-22 PROCEDURE — 80053 COMPREHEN METABOLIC PANEL: CPT

## 2024-07-24 ENCOUNTER — TELEPHONE (OUTPATIENT)
Age: 69
End: 2024-07-24

## 2024-07-24 NOTE — TELEPHONE ENCOUNTER
Patient Communication     Released  Not seen Back to Top    Labs look like they are improving.   Written by Jose Camacho MD on 7/23/2024  9:42 AM EDT     Pt was notified of lab results. She did also mention that when she come in for her next appt she will need help reinstalling the My chart tye, as she had to get a new phone and no longer has the tye

## 2024-07-29 ENCOUNTER — TELEPHONE (OUTPATIENT)
Dept: GASTROENTEROLOGY | Facility: CLINIC | Age: 69
End: 2024-07-29

## 2024-07-29 NOTE — TELEPHONE ENCOUNTER
----- Message from Ivelisse Choi PA-C sent at 7/29/2024  7:55 AM EDT -----  Please read Dr. Riojas's Zym[employer]orkst message to the patient, she did not see it    HI Chelsy-     The biopsy of the esophagus was normal.  This is good news.  Have a good day.

## 2024-08-23 DIAGNOSIS — D64.9 ANEMIA, UNSPECIFIED TYPE: ICD-10-CM

## 2024-08-23 RX ORDER — FERROUS SULFATE 325(65) MG
1 TABLET ORAL
Qty: 30 TABLET | Refills: 0 | Status: SHIPPED | OUTPATIENT
Start: 2024-08-23

## 2024-09-10 ENCOUNTER — TELEPHONE (OUTPATIENT)
Age: 69
End: 2024-09-10

## 2024-09-10 NOTE — TELEPHONE ENCOUNTER
[facility] [address] [place] [address]. Provided address and read office directions. Patient verbalized understanding, no other questions.

## 2024-09-19 DIAGNOSIS — D64.9 ANEMIA, UNSPECIFIED TYPE: ICD-10-CM

## 2024-09-19 RX ORDER — FERROUS SULFATE 325(65) MG
1 TABLET ORAL
Qty: 30 TABLET | Refills: 3 | Status: SHIPPED | OUTPATIENT
Start: 2024-09-19

## 2024-09-24 DIAGNOSIS — I10 HYPERTENSION, BENIGN: ICD-10-CM

## 2024-09-24 RX ORDER — METOPROLOL TARTRATE 50 MG
50 TABLET ORAL 2 TIMES DAILY
Qty: 180 TABLET | Refills: 1 | Status: SHIPPED | OUTPATIENT
Start: 2024-09-24

## 2024-10-25 DIAGNOSIS — K21.9 GERD WITHOUT ESOPHAGITIS: ICD-10-CM

## 2024-10-25 RX ORDER — PANTOPRAZOLE SODIUM 40 MG/1
TABLET, DELAYED RELEASE ORAL
Qty: 90 TABLET | Refills: 1 | Status: SHIPPED | OUTPATIENT
Start: 2024-10-25

## 2024-11-19 ENCOUNTER — OFFICE VISIT (OUTPATIENT)
Dept: INTERNAL MEDICINE CLINIC | Facility: CLINIC | Age: 69
End: 2024-11-19
Payer: COMMERCIAL

## 2024-11-19 VITALS
DIASTOLIC BLOOD PRESSURE: 58 MMHG | WEIGHT: 181.2 LBS | HEIGHT: 62 IN | RESPIRATION RATE: 18 BRPM | HEART RATE: 70 BPM | BODY MASS INDEX: 33.34 KG/M2 | OXYGEN SATURATION: 96 % | SYSTOLIC BLOOD PRESSURE: 108 MMHG | TEMPERATURE: 99.2 F

## 2024-11-19 DIAGNOSIS — D50.9 IRON DEFICIENCY ANEMIA, UNSPECIFIED IRON DEFICIENCY ANEMIA TYPE: ICD-10-CM

## 2024-11-19 DIAGNOSIS — I10 HYPERTENSION, BENIGN: Primary | ICD-10-CM

## 2024-11-19 DIAGNOSIS — E78.2 MIXED HYPERLIPIDEMIA: ICD-10-CM

## 2024-11-19 DIAGNOSIS — R73.01 IMPAIRED FASTING GLUCOSE: ICD-10-CM

## 2024-11-19 DIAGNOSIS — Z23 NEED FOR INFLUENZA VACCINATION: ICD-10-CM

## 2024-11-19 DIAGNOSIS — Z12.31 ENCOUNTER FOR SCREENING MAMMOGRAM FOR BREAST CANCER: ICD-10-CM

## 2024-11-19 PROCEDURE — 90662 IIV NO PRSV INCREASED AG IM: CPT | Performed by: INTERNAL MEDICINE

## 2024-11-19 PROCEDURE — G0008 ADMIN INFLUENZA VIRUS VAC: HCPCS | Performed by: INTERNAL MEDICINE

## 2024-11-19 PROCEDURE — 99214 OFFICE O/P EST MOD 30 MIN: CPT | Performed by: INTERNAL MEDICINE

## 2024-11-19 NOTE — PROGRESS NOTES
"Name: Chelsy Alford      : 1955      MRN: [id number]  Encounter Provider: Jose Camacho MD  Encounter Date: 2024   Encounter department: [facility] INTERNAL MEDICINE [place]  :  Assessment & Plan  Hypertension, benign  Controlled.  No change in medication.       Impaired fasting glucose  Continue diet.  Encouraged her to get her blood work done.       Iron deficiency anemia, unspecified iron deficiency anemia type  Recheck counts.  Hopefully we can reduce her to Monday, Wednesday, Friday for iron tablets.       Mixed hyperlipidemia  Encouraged her to get her labs done.       Encounter for screening mammogram for breast cancer    Orders:    Mammo screening bilateral w 3d and cad; Future           History of Present Illness     Patient comes in today for routine follow-up.  She admits she forgot to get her labs done.  She has been taking her medications as directed.  Blood pressure is controlled.  Watching her diet for the sugar.  Still taking the iron tablets but does admit to some bloating.  Cholesterol has been controlled.  No further additions to her history.      Review of Systems   Respiratory:  Negative for shortness of breath.    Cardiovascular:  Negative for chest pain.   Gastrointestinal:  Negative for abdominal pain.          Objective   /58 (BP Location: Left arm, Patient Position: Sitting, Cuff Size: Standard)   Pulse 70   Temp 99.2 °F (37.3 °C) (Tympanic)   Resp 18   Ht 5' 2\" (1.575 m)   Wt 82.2 kg (181 lb 3.2 oz)   SpO2 96%   BMI 33.14 kg/m²      Physical Exam  Vitals and nursing note reviewed.   Constitutional:       Appearance: Normal appearance. She is well-developed.   Cardiovascular:      Rate and Rhythm: Normal rate and regular rhythm.      Heart sounds: Normal heart sounds.   Pulmonary:      Effort: Pulmonary effort is normal.      Breath sounds: Normal breath sounds.   Abdominal:      Palpations: Abdomen is soft.      Tenderness: There is no abdominal " tenderness.   Neurological:      Mental Status: She is alert and oriented to person, place, and time.   Psychiatric:         Mood and Affect: Mood normal.         Behavior: Behavior normal.

## 2024-11-20 ENCOUNTER — APPOINTMENT (OUTPATIENT)
Age: 69
End: 2024-11-20
Payer: COMMERCIAL

## 2024-11-20 DIAGNOSIS — D50.9 IRON DEFICIENCY ANEMIA, UNSPECIFIED IRON DEFICIENCY ANEMIA TYPE: ICD-10-CM

## 2024-11-20 LAB
BASOPHILS # BLD AUTO: 0.03 THOUSANDS/ÂΜL (ref 0–0.1)
BASOPHILS NFR BLD AUTO: 1 % (ref 0–1)
EOSINOPHIL # BLD AUTO: 0.16 THOUSAND/ÂΜL (ref 0–0.61)
EOSINOPHIL NFR BLD AUTO: 3 % (ref 0–6)
ERYTHROCYTE [DISTWIDTH] IN BLOOD BY AUTOMATED COUNT: 13.9 % (ref 11.6–15.1)
HCT VFR BLD AUTO: 36 % (ref 34.8–46.1)
HGB BLD-MCNC: 11.9 G/DL (ref 11.5–15.4)
IMM GRANULOCYTES # BLD AUTO: 0.03 THOUSAND/UL (ref 0–0.2)
IMM GRANULOCYTES NFR BLD AUTO: 1 % (ref 0–2)
LYMPHOCYTES # BLD AUTO: 1.3 THOUSANDS/ÂΜL (ref 0.6–4.47)
LYMPHOCYTES NFR BLD AUTO: 25 % (ref 14–44)
MCH RBC QN AUTO: 29.7 PG (ref 26.8–34.3)
MCHC RBC AUTO-ENTMCNC: 33.1 G/DL (ref 31.4–37.4)
MCV RBC AUTO: 90 FL (ref 82–98)
MONOCYTES # BLD AUTO: 0.48 THOUSAND/ÂΜL (ref 0.17–1.22)
MONOCYTES NFR BLD AUTO: 9 % (ref 4–12)
NEUTROPHILS # BLD AUTO: 3.25 THOUSANDS/ÂΜL (ref 1.85–7.62)
NEUTS SEG NFR BLD AUTO: 61 % (ref 43–75)
NRBC BLD AUTO-RTO: 0 /100 WBCS
PLATELET # BLD AUTO: 205 THOUSANDS/UL (ref 149–390)
PMV BLD AUTO: 9.5 FL (ref 8.9–12.7)
RBC # BLD AUTO: 4.01 MILLION/UL (ref 3.81–5.12)
WBC # BLD AUTO: 5.25 THOUSAND/UL (ref 4.31–10.16)

## 2024-11-20 PROCEDURE — 85025 COMPLETE CBC W/AUTO DIFF WBC: CPT

## 2024-11-20 PROCEDURE — 36415 COLL VENOUS BLD VENIPUNCTURE: CPT

## 2024-12-03 ENCOUNTER — CONSULT (OUTPATIENT)
Age: 69
End: 2024-12-03
Payer: COMMERCIAL

## 2024-12-03 VITALS — SYSTOLIC BLOOD PRESSURE: 124 MMHG | WEIGHT: 180 LBS | BODY MASS INDEX: 32.92 KG/M2 | DIASTOLIC BLOOD PRESSURE: 84 MMHG

## 2024-12-03 DIAGNOSIS — Z01.419 ENCOUNTER FOR ANNUAL ROUTINE GYNECOLOGICAL EXAMINATION: Primary | ICD-10-CM

## 2024-12-03 DIAGNOSIS — Z85.3 PERSONAL HISTORY OF BREAST CANCER: ICD-10-CM

## 2024-12-03 DIAGNOSIS — Z78.0 ASYMPTOMATIC POSTMENOPAUSAL STATUS: ICD-10-CM

## 2024-12-03 DIAGNOSIS — Z12.31 ENCOUNTER FOR SCREENING MAMMOGRAM FOR BREAST CANCER: ICD-10-CM

## 2024-12-03 DIAGNOSIS — Z12.4 SCREENING FOR CERVICAL CANCER: ICD-10-CM

## 2024-12-03 PROCEDURE — G0476 HPV COMBO ASSAY CA SCREEN: HCPCS | Performed by: NURSE PRACTITIONER

## 2024-12-03 PROCEDURE — G0145 SCR C/V CYTO,THINLAYER,RESCR: HCPCS | Performed by: STUDENT IN AN ORGANIZED HEALTH CARE EDUCATION/TRAINING PROGRAM

## 2024-12-03 PROCEDURE — G0101 CA SCREEN;PELVIC/BREAST EXAM: HCPCS | Performed by: NURSE PRACTITIONER

## 2024-12-03 NOTE — PATIENT INSTRUCTIONS
Patient Education     Lowering Your Risk of Breast Cancer   About this topic   Breast cancer is a serious illness. Breast cancer is when abnormal cells grow and divide more quickly in your breast. These cells form a growth or tumor. The abnormal cells may enter nearby tissue and spread to other parts of the body. It is the type of cancer most often seen in women. Men can have breast cancer, but it is a rare condition.  General   Some things in your life may increase your risk of breast cancer. You may not be able to change some of these. Others you can control.  You are more likely to get breast cancer if you:  Have a mother, sister, or daughter who has had breast cancer  Have used hormones for menopause for more than 5 years  Have had radiation therapy to the breast or chest in the past  Are overweight or do not exercise  Had your first menstrual period before you were 11 years old  Went through menopause after age 55  Have never been pregnant or had your first child after age 35  Have had breast cancer before  Drink alcohol in any form  Have dense breasts  Are older in age  There is no certain way to prevent breast cancer. There are things you can do to lower your chances of having breast cancer.  Keep a healthy weight. Lose weight if you are overweight. Being overweight raises your chances of having breast cancer.  Eat a healthy diet to maintain a healthy weight, such as more fruits, vegetables, and lean cuts of meat. Decrease the amount of saturated fat in your diet.  Exercise. Being active helps you keep a healthy weight.  Limit your alcohol intake or do not drink alcohol. The more alcohol you drink, the higher your risk.  Do not smoke cigarettes. Smoking can increase your risk of many types of cancer.  Breastfeed your baby. This may help protect you. The longer you breastfeed, the more protection you have.  Talk with your doctor about:  Limiting or stopping hormone therapy.  Taking certain drugs to prevent  breast cancer. For women at high risk of having breast cancer, there are a few drugs that may lower your risk.  Surgery to prevent you from having breast cancer if you are very high risk.  When do I need to call the doctor?   Changes in your breasts  A lump or area in your breast that feels different  Discharge from your nipple  Skin on your breast is dimpled or indented  You have questions or concerns about your breasts  Helpful tips   Talk to your doctor about the best kind of breast cancer screening for you.  If you want to do self breast exams, have your doctor show you the right way to do them.  Tell your doctor of any abnormal finding.  Last Reviewed Date   2021-10-04  Consumer Information Use and Disclaimer   This generalized information is a limited summary of diagnosis, treatment, and/or medication information. It is not meant to be comprehensive and should be used as a tool to help the user understand and/or assess potential diagnostic and treatment options. It does NOT include all information about conditions, treatments, medications, side effects, or risks that may apply to a specific patient. It is not intended to be medical advice or a substitute for the medical advice, diagnosis, or treatment of a health care provider based on the health care provider's examination and assessment of a patient’s specific and unique circumstances. Patients must speak with a health care provider for complete information about their health, medical questions, and treatment options, including any risks or benefits regarding use of medications. This information does not endorse any treatments or medications as safe, effective, or approved for treating a specific patient. [employer] and its affiliates disclaim any warranty or liability relating to this information or the use thereof. The use of this information is governed by the Terms of Use, available at  https://www.woltersPromodityuwer.com/en/know/clinical-effectiveness-terms   Copyright   Copyright © 2024 [employer] and its affiliates and/or licensors. All rights reserved.

## 2024-12-03 NOTE — PROGRESS NOTES
Diagnoses and all orders for this visit:    Encounter for annual routine gynecological examination    Asymptomatic postmenopausal status    Screening for cervical cancer  -     Ambulatory referral to Obstetrics / Gynecology    Personal history of breast cancer  -     MRI breast bilateral w and wo contrast w cad; Future    Encounter for screening mammogram for breast cancer  Comments:  Ordered by her PCP    Call as needed, encouraged calcium/vit D in her diet, call with any PMB, all questions answered          Pleasant 69 y.o. NP postmenopausal female here for annual exam. She denies postmenopausal bleeding. She denies history of abnormal pap smears, last Pap 2015 neg/neg. A pap was done today. She denies vaginal issues. She denies pelvic pain. She denies postmenopausal issues. She is sexually active without any concerns. Last colonoscopy done 2024, due again in 10 years. Last mammogram 2024 normal. S/p right lumpectomy and ALND 3/26/2003 (Dr. Allen), received chemo and radiation.  She took tamoxifen for 5 years.  Sister with breast cancer at 67. MRI ordered for history of breast cancer, pt instructed to check on insurance coverage for this test. Advised to do 6 months after her mammogram for continued surveillance. Patient agrees with this plan. DXA normal 24 normal.    Past Medical History:   Diagnosis Date    Anemia     Arthritis     Bacterial pneumonia     last assessed: 2017    Borderline diabetes     Breast cancer (HCC)     right    Colon polyp     Depression     Diastolic dysfunction     GERD (gastroesophageal reflux disease)     High cholesterol     History of chemotherapy 2003    right breast ca    History of radiation therapy 2003    right breast ca    Hypertension     Insomnia     Palpitations      Past Surgical History:   Procedure Laterality Date    BREAST LUMPECTOMY Right      SECTION      COLONOSCOPY      ESOPHAGOGASTRODUODENOSCOPY N/A 2019     Procedure: ESOPHAGOGASTRODUODENOSCOPY (EGD);  Surgeon: Poli Riojas III, MD;  Location: [facility];  Service: Gastroenterology    JOINT REPLACEMENT Right     knee    KNEE SURGERY      OK ARTHRP KNE CONDYLE&PLATU MEDIAL&LAT COMPARTMENTS Right 01/18/2016    Procedure: ARTHROPLASTY KNEE TOTAL;  Surgeon: Angela Noel MD;  [facility]: [facility];  Service: Orthopedics    OK ARTHRP KNE CONDYLE&PLATU MEDIAL&LAT COMPARTMENTS Left 03/15/2023    Procedure: Left total knee arthroplasty;  Surgeon: Tony Cano DO;  Location: [facility];  Service: Orthopedics    OK COLONOSCOPY FLX DX W/COLLJ SPEC WHEN PFRMD N/A 12/31/2018    Procedure: COLONOSCOPY;  Surgeon: Poli Riojas III, MD;  Location: [facility];  Service: Gastroenterology    OK ESOPHAGOGASTRODUODENOSCOPY TRANSORAL DIAGNOSTIC N/A 12/31/2018    Procedure: ESOPHAGOGASTRODUODENOSCOPY (EGD);  Surgeon: Poli Riojas III, MD;  Location: [facility];  Service: Gastroenterology     Family History   Problem Relation Age of Onset    Diabetes Mother     Diabetes Father     Diabetes Sister     Cancer Sister     Diabetes Brother     No Known Problems Son     Thyroid disease Daughter     Diabetes Brother     No Known Problems Brother     No Known Problems Brother     No Known Problems Brother     No Known Problems Brother     No Known Problems Brother     Diabetes Sister     Cancer Sister     Diabetes Sister     Cancer Sister     No Known Problems Sister     No Known Problems Sister     No Known Problems Sister     No Known Problems Son     Breast cancer Neg Hx      Social History     Tobacco Use    Smoking status: Never    Smokeless tobacco: Never   Vaping Use    Vaping status: Never Used   Substance Use Topics    Alcohol use: Never    Drug use: No       Current Outpatient Medications:     ferrous sulfate 325 (65 Fe) mg tablet, Take 1 tablet (325 mg total) by mouth daily with breakfast, Disp: 30 tablet, Rfl: 3    hydroCHLOROthiazide 25 mg tablet, TAKE 1 TABLET (25 MG  TOTAL) BY MOUTH DAILY, Disp: 90 tablet, Rfl: 1    methocarbamol (ROBAXIN) 500 mg tablet, Take 1 tablet (500 mg total) by mouth 2 (two) times a day, Disp: 10 tablet, Rfl: 0    metoprolol tartrate (LOPRESSOR) 50 mg tablet, TAKE 1 TABLET (50 MG TOTAL) BY MOUTH 2 (TWO) TIMES A DAY, Disp: 180 tablet, Rfl: 1    naproxen (Naprosyn) 500 mg tablet, Take 1 tablet (500 mg total) by mouth 2 (two) times a day with meals, Disp: 30 tablet, Rfl: 0    NIFEdipine ER (ADALAT CC) 60 MG 24 hr tablet, Take 60 mg by mouth daily, Disp: , Rfl:     ondansetron (ZOFRAN) 4 mg tablet, Take 1 tablet (4 mg total) by mouth every 6 (six) hours as needed for nausea or vomiting, Disp: 12 tablet, Rfl: 0    ondansetron (ZOFRAN) 4 mg tablet, Take 1 tablet (4 mg total) by mouth every 8 (eight) hours as needed for nausea or vomiting, Disp: 20 tablet, Rfl: 0    pantoprazole (PROTONIX) 40 mg tablet, TAKE ONE (1) TABLET BY MOUTH DAILY IN THE MORNING, Disp: 90 tablet, Rfl: 1    sertraline (ZOLOFT) 100 mg tablet, Take 1 tablet (100 mg total) by mouth daily, Disp: 30 tablet, Rfl: 11    simvastatin (ZOCOR) 20 mg tablet, TAKE 1 TABLET (20 MG TOTAL) BY MOUTH DAILY AT BEDTIME, Disp: 30 tablet, Rfl: 5    valsartan (DIOVAN) 320 MG tablet, TAKE 1 TABLET (320 MG TOTAL) BY MOUTH DAILY, Disp: 90 tablet, Rfl: 1  Patient Active Problem List    Diagnosis Date Noted    Acute diverticulitis 05/15/2024    Mild episode of recurrent major depressive disorder (HCC) 07/27/2022    History of right breast cancer 03/20/2020    Personal history of malignant neoplasm of breast 04/25/2019    Gastroesophageal reflux disease 01/15/2019    Iron deficiency anemia 12/13/2018    Unintentional weight loss 12/13/2018    Murmur, heart 12/13/2018    Callus of foot 03/15/2018    Bunion of great toe of left foot 03/15/2018    Primary osteoarthritis of left knee 05/25/2016    Diastolic dysfunction 12/09/2015    Impaired fasting glucose 05/13/2015    Chronic insomnia 11/04/2014    Mixed  "hyperlipidemia 2014    Hypertension, benign 2014       Allergies   Allergen Reactions    Ancef [Cefazolin] Hives     Possible hives during TKA surgery 3/15/2023.  See postop note.       OB History    Para Term  AB Living   4 4 3   4   SAB IAB Ectopic Multiple Live Births             # Outcome Date GA Lbr Contreras/2nd Weight Sex Type Anes PTL Lv   4 Para            3 Term            2 Term            1 Term               Obstetric Comments   One baby passed at 18 days old all  deliveries      3 living children  \"About\" 6 grandchildren, oldest is 22  No greats yet    Vitals:    24 1456   BP: 124/84   BP Location: Left arm   Patient Position: Sitting   Cuff Size: Standard   Weight: 81.6 kg (180 lb)     Body mass index is 32.92 kg/m².    Review of Systems   Constitutional: Negative for chills, fatigue, fever and unexpected weight change.   Respiratory: Negative for shortness of breath.    Gastrointestinal: Negative for anal bleeding, blood in stool, constipation and diarrhea. +Hemorrhoids  Genitourinary: Negative for difficulty urinating, dysuria and hematuria.     Physical Exam   Constitutional: She appears well-developed and well-nourished. No distress.   HENT: atraumatic, EOMI  Head: Normocephalic.   Neck: Normal range of motion. Neck supple.   Pulmonary: Effort normal.  Breasts: bilateral without masses, skin changes or nipple discharge. Bilaterally soft and warm to touch. No areas of erythema or pain.  Abdominal: Soft.   Pelvic exam was performed with patient supine. No labial fusion. There is no rash, tenderness, lesion or injury on the right labia. There is no rash, tenderness, lesion or injury on the left labia. Urethral meatus does not show any tenderness, inflammation or discharge. Palpation of midline bladder without pain or discomfort. Uterus is not deviated, not enlarged, not fixed and not tender. Cervix exhibits no motion tenderness, no discharge and no friability. Right " adnexum displays no mass, no tenderness and no fullness. Left adnexum displays no mass, no tenderness and no fullness. No erythema or tenderness in the vagina. No foreign body in the vagina. No signs of injury around the vagina or anus. Perineum without lesions, signs of injury, erythema or swelling. No vaginal discharge found. Small hemorrhoids noted.   Lymphadenopathy:        Right: No inguinal adenopathy present.        Left: No inguinal adenopathy present.

## 2024-12-04 LAB
HPV HR 12 DNA CVX QL NAA+PROBE: POSITIVE
HPV16 DNA CVX QL NAA+PROBE: NEGATIVE
HPV18 DNA CVX QL NAA+PROBE: NEGATIVE

## 2024-12-10 ENCOUNTER — RESULTS FOLLOW-UP (OUTPATIENT)
Age: 69
End: 2024-12-10

## 2024-12-10 ENCOUNTER — TELEPHONE (OUTPATIENT)
Age: 69
End: 2024-12-10

## 2024-12-10 LAB
LAB AP GYN PRIMARY INTERPRETATION: ABNORMAL
Lab: ABNORMAL
PATH INTERP SPEC-IMP: ABNORMAL

## 2024-12-10 PROCEDURE — G0124 SCREEN C/V THIN LAYER BY MD: HCPCS | Performed by: STUDENT IN AN ORGANIZED HEALTH CARE EDUCATION/TRAINING PROGRAM

## 2024-12-10 NOTE — TELEPHONE ENCOUNTER
Reviewed results, recommendations to schedule colpo. Attempt warm transfer to [place] who requested message be forwarded to clerical staff for further appt assistance.

## 2024-12-11 ENCOUNTER — TELEPHONE (OUTPATIENT)
Age: 69
End: 2024-12-11

## 2024-12-11 DIAGNOSIS — D64.9 ANEMIA, UNSPECIFIED TYPE: ICD-10-CM

## 2024-12-11 RX ORDER — FERROUS SULFATE 325(65) MG
1 TABLET ORAL
Qty: 30 TABLET | Refills: 0 | Status: SHIPPED | OUTPATIENT
Start: 2024-12-11

## 2024-12-11 NOTE — TELEPHONE ENCOUNTER
Answered questions regarding HPV.  Patient was very nervous about her Pap result.  I reassured her and advised that she will be getting acolposcopy for a second look.  All questions answered.  Patient was happy with the conversation.

## 2024-12-11 NOTE — TELEPHONE ENCOUNTER
Patient calling in stating that she would like a call back from Joan NP due to pap results as pt has direct questions for provider.

## 2024-12-12 ENCOUNTER — TELEPHONE (OUTPATIENT)
Age: 69
End: 2024-12-12

## 2024-12-12 NOTE — TELEPHONE ENCOUNTER
Patient called in to verify how much ibu she should take prior to her colpo. Informed patient she can take up to 400mg ibuprofen 1 hour prior to appointment time for pain control.

## 2024-12-16 ENCOUNTER — VBI (OUTPATIENT)
Dept: ADMINISTRATIVE | Facility: OTHER | Age: 69
End: 2024-12-16

## 2024-12-16 NOTE — TELEPHONE ENCOUNTER
12/16/24 2:57 PM     Chart reviewed for [employer] ; nothing is submitted to the patient's insurance at this time.     Judi Concepcion   [facility]

## 2024-12-18 DIAGNOSIS — E78.2 MIXED HYPERLIPIDEMIA: ICD-10-CM

## 2024-12-19 RX ORDER — SIMVASTATIN 20 MG
20 TABLET ORAL
Qty: 30 TABLET | Refills: 5 | Status: SHIPPED | OUTPATIENT
Start: 2024-12-19

## 2024-12-30 DIAGNOSIS — I10 HYPERTENSION, BENIGN: ICD-10-CM

## 2024-12-31 RX ORDER — VALSARTAN 320 MG/1
320 TABLET ORAL DAILY
Qty: 90 TABLET | Refills: 1 | Status: SHIPPED | OUTPATIENT
Start: 2024-12-31

## 2025-01-04 DIAGNOSIS — K21.9 GERD WITHOUT ESOPHAGITIS: ICD-10-CM

## 2025-01-05 RX ORDER — PANTOPRAZOLE SODIUM 40 MG/1
TABLET, DELAYED RELEASE ORAL
Qty: 90 TABLET | Refills: 1 | Status: SHIPPED | OUTPATIENT
Start: 2025-01-05

## 2025-01-08 ENCOUNTER — PROCEDURE VISIT (OUTPATIENT)
Dept: OBGYN CLINIC | Facility: CLINIC | Age: 70
End: 2025-01-08
Payer: COMMERCIAL

## 2025-01-08 VITALS — BODY MASS INDEX: 33.22 KG/M2 | DIASTOLIC BLOOD PRESSURE: 82 MMHG | WEIGHT: 181.6 LBS | SYSTOLIC BLOOD PRESSURE: 132 MMHG

## 2025-01-08 DIAGNOSIS — R87.610 ATYPICAL SQUAMOUS CELL CHANGES OF UNDETERMINED SIGNIFICANCE (ASCUS) ON CERVICAL CYTOLOGY WITH POSITIVE HIGH RISK HUMAN PAPILLOMA VIRUS (HPV): Primary | ICD-10-CM

## 2025-01-08 DIAGNOSIS — R87.810 ATYPICAL SQUAMOUS CELL CHANGES OF UNDETERMINED SIGNIFICANCE (ASCUS) ON CERVICAL CYTOLOGY WITH POSITIVE HIGH RISK HUMAN PAPILLOMA VIRUS (HPV): Primary | ICD-10-CM

## 2025-01-08 PROCEDURE — 88305 TISSUE EXAM BY PATHOLOGIST: CPT | Performed by: PATHOLOGY

## 2025-01-08 PROCEDURE — 57454 BX/CURETT OF CERVIX W/SCOPE: CPT | Performed by: OBSTETRICS & GYNECOLOGY

## 2025-01-08 PROCEDURE — 88342 IMHCHEM/IMCYTCHM 1ST ANTB: CPT | Performed by: PATHOLOGY

## 2025-01-08 NOTE — PROGRESS NOTES
Name: Chelsy Alford      : 1955      MRN: [id number]  Encounter Provider: Sarai Lund MD  Encounter Date: 2025   Encounter department: [facility]  :  Assessment & Plan  Atypical squamous cell changes of undetermined significance (ASCUS) on cervical cytology with positive high risk human papilloma virus (HPV)  Colposcopy completed.   Biopsies:  12 : atrophic, small area of friable mucosa suspect due to atrophy  6: acetowhite, no mosaicism, no punctation, no friable tissue  ECC    Suspect: cervicitis, atrophy.   Plan for pap/HPV next year pending biopsy results.               History of Present Illness   HPI  Chelsy Alford is a 69 y.o. female who presents for Colpo. Pap ascus/hpv+ 12/3/24. Pt very nervous and emotional. Worried about cancer    Colposcopy    Date/Time: 2025 1:30 PM    Performed by: Sarai Lund MD  Authorized by: Sarai Lund MD    Other Assisting Provider: No    Verbal consent obtained?: Yes    Risks and benefits: Risks, benefits and alternatives were discussed    Consent given by:  Patient  Pre-procedure:     Prepped with: acetic acid    Indication:     Indication:  ASC-US (ascus HPV + other)  Procedure:     Procedure: Colposcopy w/ cervical biopsy and ECC      [place] speculum was placed in the vagina: yes      Under colposcopic examination the transition zone was seen in entirety: yes      Endocervix was curetted using a Kevorkian curette: yes      Cervical biopsy performed with a cervical biopsy punch: yes      Monsel's solution was applied: yes      Allis/Tenaculum removed and cervix homostatic: yes      Specimen(s) to pathology: yes    Post-procedure:     Findings: Friable cervix and White epithelium      Impression comment:  Cervicitis, atrophy    Patient tolerance of procedure:  Tolerated well, no immediate complications      Review of Systems       Objective   /82 (BP Location: Left arm,  Patient Position: Sitting, Cuff Size: Standard)   Wt 82.4 kg (181 lb 9.6 oz)   BMI 33.22 kg/m²      Physical Exam

## 2025-01-13 PROCEDURE — 88342 IMHCHEM/IMCYTCHM 1ST ANTB: CPT | Performed by: PATHOLOGY

## 2025-01-13 PROCEDURE — 88305 TISSUE EXAM BY PATHOLOGIST: CPT | Performed by: PATHOLOGY

## 2025-01-14 ENCOUNTER — RESULTS FOLLOW-UP (OUTPATIENT)
Dept: OBGYN CLINIC | Facility: CLINIC | Age: 70
End: 2025-01-14

## 2025-01-16 NOTE — PROGRESS NOTES
Scheduled Ms. Alford for a repeat Pap Smear next year with CAROL Chand in the [place] [facility] on Tuesday, December 9, 2025 @ 12:30pm per Dr. Abdulaziz Spencer's request.

## 2025-01-18 DIAGNOSIS — I10 ESSENTIAL HYPERTENSION: ICD-10-CM

## 2025-01-20 RX ORDER — HYDROCHLOROTHIAZIDE 25 MG/1
25 TABLET ORAL DAILY
Qty: 90 TABLET | Refills: 1 | Status: SHIPPED | OUTPATIENT
Start: 2025-01-20

## 2025-02-03 ENCOUNTER — OFFICE VISIT (OUTPATIENT)
Dept: INTERNAL MEDICINE CLINIC | Facility: CLINIC | Age: 70
End: 2025-02-03
Payer: COMMERCIAL

## 2025-02-03 VITALS
WEIGHT: 180.6 LBS | DIASTOLIC BLOOD PRESSURE: 80 MMHG | OXYGEN SATURATION: 98 % | HEIGHT: 62 IN | HEART RATE: 62 BPM | RESPIRATION RATE: 18 BRPM | SYSTOLIC BLOOD PRESSURE: 130 MMHG | BODY MASS INDEX: 33.23 KG/M2 | TEMPERATURE: 98 F

## 2025-02-03 DIAGNOSIS — L30.4 INTERTRIGO: Primary | ICD-10-CM

## 2025-02-03 DIAGNOSIS — F33.0 MILD EPISODE OF RECURRENT MAJOR DEPRESSIVE DISORDER (HCC): ICD-10-CM

## 2025-02-03 PROCEDURE — G2211 COMPLEX E/M VISIT ADD ON: HCPCS

## 2025-02-03 PROCEDURE — 99214 OFFICE O/P EST MOD 30 MIN: CPT

## 2025-02-03 RX ORDER — NYSTATIN 100000 [USP'U]/G
POWDER TOPICAL 3 TIMES DAILY
Qty: 15 G | Refills: 3 | Status: CANCELLED | OUTPATIENT
Start: 2025-02-03

## 2025-02-03 RX ORDER — NYSTATIN 100000 U/G
CREAM TOPICAL 2 TIMES DAILY
Qty: 30 G | Refills: 0 | Status: SHIPPED | OUTPATIENT
Start: 2025-02-03

## 2025-02-03 NOTE — PROGRESS NOTES
Name: Chelsy Alford      : 1955      MRN: [id number]  Encounter Provider: CAROL Griffiths  Encounter Date: 2/3/2025   Encounter department: [facility] [place]  :  Assessment & Plan  Intertrigo  Bright pink rash under left breast with satellite lesions.  Encouraged patient to continue using nystatin cream.  Sent new prescription to the pharmacy.  Discussed Interdry with patient and keeping the area clean and dry.  Orders:  •  nystatin (MYCOSTATIN) cream; Apply topically 2 (two) times a day    Mild episode of recurrent major depressive disorder (HCC)  Continue current medication regimen.              History of Present Illness {?Quick Links Encounters * My Last Note * Last Note in Specialty * Snapshot * Since Last Visit * History :63096}  Chelsy is here today with concerns of a rash under her left breast that started around last Friday.  She states it was moist and itchy with no openings. It has currently started to subside with using nystatin cream.       Review of Systems   Constitutional:  Negative for chills and fever.   HENT:  Negative for congestion, ear pain, rhinorrhea and sore throat.    Eyes:  Negative for pain and visual disturbance.   Respiratory:  Negative for cough, chest tightness and shortness of breath.    Cardiovascular:  Negative for chest pain, palpitations and leg swelling.   Gastrointestinal:  Negative for abdominal pain, constipation, diarrhea, nausea and vomiting.   Endocrine: Negative.    Genitourinary:  Negative for dysuria, frequency, hematuria and urgency.   Musculoskeletal:  Negative for arthralgias and back pain.   Skin:  Positive for rash. Negative for color change.   Allergic/Immunologic: Negative.    Neurological:  Negative for dizziness, seizures, syncope and headaches.   Hematological: Negative.    Psychiatric/Behavioral: Negative.     All other systems reviewed and are negative.      Objective {?Quick Links Trend Vitals * Enter New Vitals *  "Results Review * Timeline (Adult) * Labs * Imaging * Cardiology * Procedures * Lung Cancer Screening * Surgical eConsent :68366}  /80 (BP Location: Left arm, Patient Position: Sitting, Cuff Size: Standard)   Pulse 62   Temp 98 °F (36.7 °C) (Tympanic)   Resp 18   Ht 5' 2\" (1.575 m)   Wt 81.9 kg (180 lb 9.6 oz)   SpO2 98%   BMI 33.03 kg/m²      Physical Exam  Vitals and nursing note reviewed.   Constitutional:       General: She is not in acute distress.     Appearance: She is well-developed.   Cardiovascular:      Rate and Rhythm: Normal rate and regular rhythm.      Pulses: Normal pulses.      Heart sounds: Normal heart sounds. No murmur heard.  Pulmonary:      Effort: Pulmonary effort is normal. No respiratory distress.      Breath sounds: Normal breath sounds.   Chest:      Comments: Bright pink rash under left breast with satellite lesions.  Abdominal:      General: Bowel sounds are normal.      Palpations: Abdomen is soft.      Tenderness: There is no abdominal tenderness.   Musculoskeletal:         General: No swelling. Normal range of motion.      Cervical back: Normal range of motion and neck supple.   Skin:     General: Skin is warm and dry.      Capillary Refill: Capillary refill takes less than 2 seconds.   Neurological:      Mental Status: She is alert and oriented to person, place, and time.   Psychiatric:         Mood and Affect: Mood normal.         "

## 2025-02-21 DIAGNOSIS — D64.9 ANEMIA, UNSPECIFIED TYPE: ICD-10-CM

## 2025-02-21 RX ORDER — FERROUS SULFATE 325(65) MG
1 TABLET ORAL
Qty: 30 TABLET | Refills: 5 | Status: SHIPPED | OUTPATIENT
Start: 2025-02-21

## 2025-03-05 DIAGNOSIS — I10 ESSENTIAL HYPERTENSION: Primary | ICD-10-CM

## 2025-03-14 ENCOUNTER — HOSPITAL ENCOUNTER (OUTPATIENT)
Age: 70
Discharge: HOME | End: 2025-03-14
Payer: COMMERCIAL

## 2025-03-14 ENCOUNTER — APPOINTMENT (OUTPATIENT)
Age: 70
End: 2025-03-14
Payer: COMMERCIAL

## 2025-03-14 VITALS — BODY MASS INDEX: 33.13 KG/M2 | WEIGHT: 180 LBS | HEIGHT: 62 IN

## 2025-03-14 DIAGNOSIS — I10 HYPERTENSION, BENIGN: ICD-10-CM

## 2025-03-14 DIAGNOSIS — I10 ESSENTIAL HYPERTENSION: Primary | ICD-10-CM

## 2025-03-14 DIAGNOSIS — R73.01 IMPAIRED FASTING GLUCOSE: ICD-10-CM

## 2025-03-14 DIAGNOSIS — Z12.31 ENCOUNTER FOR SCREENING MAMMOGRAM FOR BREAST CANCER: ICD-10-CM

## 2025-03-14 DIAGNOSIS — I10 ESSENTIAL HYPERTENSION: ICD-10-CM

## 2025-03-14 LAB
ALBUMIN SERPL BCG-MCNC: 4.4 G/DL (ref 3.5–5)
ALP SERPL-CCNC: 74 U/L (ref 34–104)
ALT SERPL W P-5'-P-CCNC: 11 U/L (ref 7–52)
ANION GAP SERPL CALCULATED.3IONS-SCNC: 8 MMOL/L (ref 4–13)
AST SERPL W P-5'-P-CCNC: 19 U/L (ref 13–39)
BASOPHILS # BLD AUTO: 0.03 THOUSANDS/ÂΜL (ref 0–0.1)
BASOPHILS NFR BLD AUTO: 1 % (ref 0–1)
BILIRUB SERPL-MCNC: 0.31 MG/DL (ref 0.2–1)
BUN SERPL-MCNC: 21 MG/DL (ref 5–25)
CALCIUM SERPL-MCNC: 9.8 MG/DL (ref 8.4–10.2)
CHLORIDE SERPL-SCNC: 104 MMOL/L (ref 96–108)
CHOLEST SERPL-MCNC: 203 MG/DL (ref ?–200)
CO2 SERPL-SCNC: 27 MMOL/L (ref 21–32)
CREAT SERPL-MCNC: 0.75 MG/DL (ref 0.6–1.3)
EOSINOPHIL # BLD AUTO: 0.13 THOUSAND/ÂΜL (ref 0–0.61)
EOSINOPHIL NFR BLD AUTO: 2 % (ref 0–6)
ERYTHROCYTE [DISTWIDTH] IN BLOOD BY AUTOMATED COUNT: 14.5 % (ref 11.6–15.1)
EST. AVERAGE GLUCOSE BLD GHB EST-MCNC: 114 MG/DL
GFR SERPL CREATININE-BSD FRML MDRD: 81 ML/MIN/1.73SQ M
GLUCOSE P FAST SERPL-MCNC: 120 MG/DL (ref 65–99)
HBA1C MFR BLD: 5.6 %
HCT VFR BLD AUTO: 38.3 % (ref 34.8–46.1)
HDLC SERPL-MCNC: 78 MG/DL
HGB BLD-MCNC: 12.7 G/DL (ref 11.5–15.4)
IMM GRANULOCYTES # BLD AUTO: 0.02 THOUSAND/UL (ref 0–0.2)
IMM GRANULOCYTES NFR BLD AUTO: 0 % (ref 0–2)
LDLC SERPL CALC-MCNC: 102 MG/DL (ref 0–100)
LYMPHOCYTES # BLD AUTO: 1.73 THOUSANDS/ÂΜL (ref 0.6–4.47)
LYMPHOCYTES NFR BLD AUTO: 32 % (ref 14–44)
MCH RBC QN AUTO: 30.2 PG (ref 26.8–34.3)
MCHC RBC AUTO-ENTMCNC: 33.2 G/DL (ref 31.4–37.4)
MCV RBC AUTO: 91 FL (ref 82–98)
MONOCYTES # BLD AUTO: 0.45 THOUSAND/ÂΜL (ref 0.17–1.22)
MONOCYTES NFR BLD AUTO: 8 % (ref 4–12)
NEUTROPHILS # BLD AUTO: 3.12 THOUSANDS/ÂΜL (ref 1.85–7.62)
NEUTS SEG NFR BLD AUTO: 57 % (ref 43–75)
NONHDLC SERPL-MCNC: 125 MG/DL
NRBC BLD AUTO-RTO: 0 /100 WBCS
PLATELET # BLD AUTO: 226 THOUSANDS/UL (ref 149–390)
PMV BLD AUTO: 9.9 FL (ref 8.9–12.7)
POTASSIUM SERPL-SCNC: 3.7 MMOL/L (ref 3.5–5.3)
PROT SERPL-MCNC: 7.1 G/DL (ref 6.4–8.4)
RBC # BLD AUTO: 4.21 MILLION/UL (ref 3.81–5.12)
SODIUM SERPL-SCNC: 139 MMOL/L (ref 135–147)
TRIGL SERPL-MCNC: 114 MG/DL (ref ?–150)
WBC # BLD AUTO: 5.48 THOUSAND/UL (ref 4.31–10.16)

## 2025-03-14 PROCEDURE — 85025 COMPLETE CBC W/AUTO DIFF WBC: CPT

## 2025-03-14 PROCEDURE — 83036 HEMOGLOBIN GLYCOSYLATED A1C: CPT

## 2025-03-14 PROCEDURE — 77067 SCR MAMMO BI INCL CAD: CPT

## 2025-03-14 PROCEDURE — 77063 BREAST TOMOSYNTHESIS BI: CPT

## 2025-03-14 PROCEDURE — 80053 COMPREHEN METABOLIC PANEL: CPT

## 2025-03-14 PROCEDURE — 36415 COLL VENOUS BLD VENIPUNCTURE: CPT

## 2025-03-14 PROCEDURE — 80061 LIPID PANEL: CPT

## 2025-03-19 ENCOUNTER — HOSPITAL ENCOUNTER (OUTPATIENT)
Dept: RADIOLOGY | Facility: HOSPITAL | Age: 70
Discharge: HOME | End: 2025-03-19
Attending: RADIOLOGY

## 2025-03-19 DIAGNOSIS — Z76.89 REFERRAL OF PATIENT WITHOUT EXAMINATION OR TREATMENT: ICD-10-CM

## 2025-03-24 DIAGNOSIS — I10 HYPERTENSION, BENIGN: ICD-10-CM

## 2025-03-25 RX ORDER — VALSARTAN 320 MG/1
320 TABLET ORAL DAILY
Qty: 30 TABLET | Refills: 0 | Status: SHIPPED | OUTPATIENT
Start: 2025-03-25

## 2025-03-26 ENCOUNTER — OFFICE VISIT (OUTPATIENT)
Dept: INTERNAL MEDICINE CLINIC | Facility: CLINIC | Age: 70
End: 2025-03-26
Payer: COMMERCIAL

## 2025-03-26 VITALS
WEIGHT: 182 LBS | BODY MASS INDEX: 33.49 KG/M2 | SYSTOLIC BLOOD PRESSURE: 124 MMHG | OXYGEN SATURATION: 97 % | HEART RATE: 65 BPM | DIASTOLIC BLOOD PRESSURE: 78 MMHG | HEIGHT: 62 IN

## 2025-03-26 DIAGNOSIS — I10 HYPERTENSION, BENIGN: Primary | ICD-10-CM

## 2025-03-26 DIAGNOSIS — D50.9 IRON DEFICIENCY ANEMIA, UNSPECIFIED IRON DEFICIENCY ANEMIA TYPE: ICD-10-CM

## 2025-03-26 DIAGNOSIS — F33.0 MILD EPISODE OF RECURRENT MAJOR DEPRESSIVE DISORDER (HCC): ICD-10-CM

## 2025-03-26 DIAGNOSIS — E78.2 MIXED HYPERLIPIDEMIA: ICD-10-CM

## 2025-03-26 DIAGNOSIS — R73.01 IMPAIRED FASTING GLUCOSE: ICD-10-CM

## 2025-03-26 PROCEDURE — G2211 COMPLEX E/M VISIT ADD ON: HCPCS | Performed by: INTERNAL MEDICINE

## 2025-03-26 PROCEDURE — 99214 OFFICE O/P EST MOD 30 MIN: CPT | Performed by: INTERNAL MEDICINE

## 2025-03-26 RX ORDER — FERROUS SULFATE 325(65) MG
1 TABLET ORAL 3 TIMES WEEKLY
Qty: 30 TABLET | Refills: 5 | Status: SHIPPED | OUTPATIENT
Start: 2025-03-26

## 2025-03-26 NOTE — ASSESSMENT & PLAN NOTE
Resolved.  Told her to start taking the iron just 3 times a week now.  Orders:  •  ferrous sulfate (FeroSul) 325 (65 Fe) mg tablet; Take 1 tablet (325 mg total) by mouth 3 (three) times a week  •  CBC and differential; Future

## 2025-03-26 NOTE — PROGRESS NOTES
"Name: Chelsy Alford      : 1955      MRN: [id number]  Encounter Provider: Jose Camacho MD  Encounter Date: 3/26/2025   Encounter department: [facility] [place]  :  Assessment & Plan  Hypertension, benign  Controlled.  No change in medication.  Orders:  •  Comprehensive metabolic panel; Future    Impaired fasting glucose  Stable.  Orders:  •  Hemoglobin A1C; Future    Mixed hyperlipidemia  Controlled.  Doing well.  Continue current medication.  Orders:  •  Lipid panel; Future    Iron deficiency anemia, unspecified iron deficiency anemia type  Resolved.  Told her to start taking the iron just 3 times a week now.  Orders:  •  ferrous sulfate (FeroSul) 325 (65 Fe) mg tablet; Take 1 tablet (325 mg total) by mouth 3 (three) times a week  •  CBC and differential; Future    Mild episode of recurrent major depressive disorder (HCC)  Well-controlled with current medication.                History of Present Illness   Patient comes in today for routine follow-up.  She states she is doing okay.  Working part-time on the weekends now.  Her chronic problems are stable.  Her anemia has fully resolved.  She is still taking the iron tablets once a day.  Blood pressure is controlled.  Depression is controlled.  Cholesterol and sugar are better.  Taking all of her medicines as directed.  Denies any new complaints today.  No further additions to her history.      Review of Systems   Respiratory:  Negative for shortness of breath.    Cardiovascular:  Negative for chest pain.   Gastrointestinal:  Negative for abdominal pain.       Objective   /78 (BP Location: Left arm, Patient Position: Sitting, Cuff Size: Standard)   Pulse 65   Ht 5' 2\" (1.575 m)   Wt 82.6 kg (182 lb)   SpO2 97%   BMI 33.29 kg/m²      Physical Exam  Vitals and nursing note reviewed.   Constitutional:       Appearance: Normal appearance. She is well-developed.   Cardiovascular:      Rate and Rhythm: Normal rate and regular " rhythm.      Heart sounds: Normal heart sounds.   Pulmonary:      Effort: Pulmonary effort is normal.      Breath sounds: Normal breath sounds.   Abdominal:      Palpations: Abdomen is soft.      Tenderness: There is no abdominal tenderness.   Neurological:      Mental Status: She is alert and oriented to person, place, and time.   Psychiatric:         Mood and Affect: Mood normal.         Behavior: Behavior normal.

## 2025-03-29 DIAGNOSIS — I10 HYPERTENSION, BENIGN: ICD-10-CM

## 2025-03-30 RX ORDER — METOPROLOL TARTRATE 50 MG
50 TABLET ORAL 2 TIMES DAILY
Qty: 180 TABLET | Refills: 1 | Status: SHIPPED | OUTPATIENT
Start: 2025-03-30

## 2025-04-01 ENCOUNTER — HOSPITAL ENCOUNTER (OUTPATIENT)
Dept: MRI IMAGING | Facility: HOSPITAL | Age: 70
Discharge: HOME | End: 2025-04-01
Payer: COMMERCIAL

## 2025-04-01 ENCOUNTER — APPOINTMENT (OUTPATIENT)
Dept: RADIOLOGY | Facility: HOSPITAL | Age: 70
End: 2025-04-01
Payer: COMMERCIAL

## 2025-04-01 DIAGNOSIS — Z85.3 PERSONAL HISTORY OF BREAST CANCER: ICD-10-CM

## 2025-04-01 PROCEDURE — A9585 GADOBUTROL INJECTION: HCPCS | Performed by: NURSE PRACTITIONER

## 2025-04-01 PROCEDURE — C8937 CAD BREAST MRI: HCPCS

## 2025-04-01 PROCEDURE — C8908 MRI W/O FOL W/CONT, BREAST,: HCPCS

## 2025-04-01 RX ORDER — GADOBUTROL 604.72 MG/ML
8 INJECTION INTRAVENOUS
Status: COMPLETED | OUTPATIENT
Start: 2025-04-01 | End: 2025-04-01

## 2025-04-01 RX ADMIN — GADOBUTROL 8 ML: 604.72 INJECTION INTRAVENOUS at 11:47

## 2025-04-07 NOTE — RESULT ENCOUNTER NOTE
Please call this patient to notify her of her results and/or recommendations which were not viewed in MyChart. Thanks.

## 2025-04-19 DIAGNOSIS — F33.0 MILD EPISODE OF RECURRENT MAJOR DEPRESSIVE DISORDER (HCC): ICD-10-CM

## 2025-04-20 RX ORDER — SERTRALINE HYDROCHLORIDE 100 MG/1
100 TABLET, FILM COATED ORAL DAILY
Qty: 30 TABLET | Refills: 5 | Status: SHIPPED | OUTPATIENT
Start: 2025-04-20

## 2025-05-08 DIAGNOSIS — E78.2 MIXED HYPERLIPIDEMIA: ICD-10-CM

## 2025-05-08 RX ORDER — SIMVASTATIN 20 MG
20 TABLET ORAL
Qty: 30 TABLET | Refills: 5 | Status: SHIPPED | OUTPATIENT
Start: 2025-05-08

## 2025-06-04 DIAGNOSIS — K21.9 GERD WITHOUT ESOPHAGITIS: ICD-10-CM

## 2025-06-05 RX ORDER — PANTOPRAZOLE SODIUM 40 MG/1
TABLET, DELAYED RELEASE ORAL
Qty: 90 TABLET | Refills: 1 | Status: SHIPPED | OUTPATIENT
Start: 2025-06-05

## 2025-06-17 DIAGNOSIS — I10 HYPERTENSION, BENIGN: ICD-10-CM

## 2025-06-17 DIAGNOSIS — I10 ESSENTIAL HYPERTENSION: ICD-10-CM

## 2025-06-17 RX ORDER — HYDROCHLOROTHIAZIDE 25 MG/1
25 TABLET ORAL DAILY
Qty: 90 TABLET | Refills: 1 | Status: SHIPPED | OUTPATIENT
Start: 2025-06-17

## 2025-06-17 RX ORDER — VALSARTAN 320 MG/1
320 TABLET ORAL DAILY
Qty: 90 TABLET | Refills: 1 | Status: SHIPPED | OUTPATIENT
Start: 2025-06-17

## 2025-06-23 ENCOUNTER — TELEPHONE (OUTPATIENT)
Dept: INTERNAL MEDICINE CLINIC | Facility: CLINIC | Age: 70
End: 2025-06-23

## 2025-06-23 DIAGNOSIS — R23.9 SKIN COMPLAINTS: Primary | ICD-10-CM

## 2025-06-23 NOTE — TELEPHONE ENCOUNTER
Patient returned call stating she tried to schedule an appointment at [facility] but they need a referral from the provider in order to schedule her. Please advise and call patient. Thank you.

## 2025-06-23 NOTE — TELEPHONE ENCOUNTER
Patient would like to be referred to a Dermatologist for skin issues. Patient would like to stay in the area and doesn't want to travel.    Please advise and notify patient when order is placed.

## 2025-06-24 NOTE — TELEPHONE ENCOUNTER
Referral placed, faxed over to [facility] and [facility] for pt regarding same    Fax to [facility]: [phone]

## 2025-06-26 NOTE — TELEPHONE ENCOUNTER
Patient called the RX Refill Line. Message is being forwarded to the office.     Patient called with correct fax number please send referral to [phone]     Any questions Please contact patient at [phone]

## 2025-07-01 NOTE — TELEPHONE ENCOUNTER
Received a call from the [facility], and she asked if referral could be faxed again to dermatology. They never received referral. Re-faxed to [phone]  Confirmation received

## 2025-07-16 ENCOUNTER — APPOINTMENT (OUTPATIENT)
Age: 70
End: 2025-07-16
Payer: COMMERCIAL

## 2025-07-16 DIAGNOSIS — I10 HYPERTENSION, BENIGN: ICD-10-CM

## 2025-07-16 DIAGNOSIS — E78.2 MIXED HYPERLIPIDEMIA: ICD-10-CM

## 2025-07-16 DIAGNOSIS — D50.9 IRON DEFICIENCY ANEMIA, UNSPECIFIED IRON DEFICIENCY ANEMIA TYPE: ICD-10-CM

## 2025-07-16 DIAGNOSIS — R73.01 IMPAIRED FASTING GLUCOSE: ICD-10-CM

## 2025-07-16 LAB
ALBUMIN SERPL BCG-MCNC: 4.1 G/DL (ref 3.5–5)
ALP SERPL-CCNC: 74 U/L (ref 34–104)
ALT SERPL W P-5'-P-CCNC: 13 U/L (ref 7–52)
ANION GAP SERPL CALCULATED.3IONS-SCNC: 9 MMOL/L (ref 4–13)
AST SERPL W P-5'-P-CCNC: 21 U/L (ref 13–39)
BASOPHILS # BLD AUTO: 0.03 THOUSANDS/ÂΜL (ref 0–0.1)
BASOPHILS NFR BLD AUTO: 1 % (ref 0–1)
BILIRUB SERPL-MCNC: 0.52 MG/DL (ref 0.2–1)
BUN SERPL-MCNC: 24 MG/DL (ref 5–25)
CALCIUM SERPL-MCNC: 9.6 MG/DL (ref 8.4–10.2)
CHLORIDE SERPL-SCNC: 106 MMOL/L (ref 96–108)
CHOLEST SERPL-MCNC: 179 MG/DL (ref ?–200)
CO2 SERPL-SCNC: 26 MMOL/L (ref 21–32)
CREAT SERPL-MCNC: 0.83 MG/DL (ref 0.6–1.3)
EOSINOPHIL # BLD AUTO: 0.19 THOUSAND/ÂΜL (ref 0–0.61)
EOSINOPHIL NFR BLD AUTO: 4 % (ref 0–6)
ERYTHROCYTE [DISTWIDTH] IN BLOOD BY AUTOMATED COUNT: 13.9 % (ref 11.6–15.1)
EST. AVERAGE GLUCOSE BLD GHB EST-MCNC: 126 MG/DL
GFR SERPL CREATININE-BSD FRML MDRD: 71 ML/MIN/1.73SQ M
GLUCOSE P FAST SERPL-MCNC: 98 MG/DL (ref 65–99)
HBA1C MFR BLD: 6 %
HCT VFR BLD AUTO: 34.5 % (ref 34.8–46.1)
HDLC SERPL-MCNC: 83 MG/DL
HGB BLD-MCNC: 11.8 G/DL (ref 11.5–15.4)
IMM GRANULOCYTES # BLD AUTO: 0.02 THOUSAND/UL (ref 0–0.2)
IMM GRANULOCYTES NFR BLD AUTO: 0 % (ref 0–2)
LDLC SERPL CALC-MCNC: 85 MG/DL (ref 0–100)
LYMPHOCYTES # BLD AUTO: 1.17 THOUSANDS/ÂΜL (ref 0.6–4.47)
LYMPHOCYTES NFR BLD AUTO: 23 % (ref 14–44)
MCH RBC QN AUTO: 30.5 PG (ref 26.8–34.3)
MCHC RBC AUTO-ENTMCNC: 34.2 G/DL (ref 31.4–37.4)
MCV RBC AUTO: 89 FL (ref 82–98)
MONOCYTES # BLD AUTO: 0.49 THOUSAND/ÂΜL (ref 0.17–1.22)
MONOCYTES NFR BLD AUTO: 10 % (ref 4–12)
NEUTROPHILS # BLD AUTO: 3.14 THOUSANDS/ÂΜL (ref 1.85–7.62)
NEUTS SEG NFR BLD AUTO: 62 % (ref 43–75)
NONHDLC SERPL-MCNC: 96 MG/DL
NRBC BLD AUTO-RTO: 0 /100 WBCS
PLATELET # BLD AUTO: 212 THOUSANDS/UL (ref 149–390)
PMV BLD AUTO: 9.5 FL (ref 8.9–12.7)
POTASSIUM SERPL-SCNC: 4 MMOL/L (ref 3.5–5.3)
PROT SERPL-MCNC: 7.1 G/DL (ref 6.4–8.4)
RBC # BLD AUTO: 3.87 MILLION/UL (ref 3.81–5.12)
SODIUM SERPL-SCNC: 141 MMOL/L (ref 135–147)
TRIGL SERPL-MCNC: 54 MG/DL (ref ?–150)
WBC # BLD AUTO: 5.04 THOUSAND/UL (ref 4.31–10.16)

## 2025-07-16 PROCEDURE — 85025 COMPLETE CBC W/AUTO DIFF WBC: CPT

## 2025-07-16 PROCEDURE — 36415 COLL VENOUS BLD VENIPUNCTURE: CPT

## 2025-07-16 PROCEDURE — 83036 HEMOGLOBIN GLYCOSYLATED A1C: CPT

## 2025-07-16 PROCEDURE — 80061 LIPID PANEL: CPT

## 2025-07-16 PROCEDURE — 80053 COMPREHEN METABOLIC PANEL: CPT

## 2025-07-28 ENCOUNTER — OFFICE VISIT (OUTPATIENT)
Dept: INTERNAL MEDICINE CLINIC | Facility: CLINIC | Age: 70
End: 2025-07-28
Payer: COMMERCIAL

## 2025-07-28 VITALS
DIASTOLIC BLOOD PRESSURE: 76 MMHG | BODY MASS INDEX: 33.05 KG/M2 | HEART RATE: 57 BPM | WEIGHT: 179.6 LBS | SYSTOLIC BLOOD PRESSURE: 136 MMHG | OXYGEN SATURATION: 97 % | RESPIRATION RATE: 18 BRPM | HEIGHT: 62 IN

## 2025-07-28 DIAGNOSIS — I10 HYPERTENSION, BENIGN: ICD-10-CM

## 2025-07-28 DIAGNOSIS — R73.01 IMPAIRED FASTING GLUCOSE: ICD-10-CM

## 2025-07-28 DIAGNOSIS — E78.2 MIXED HYPERLIPIDEMIA: ICD-10-CM

## 2025-07-28 DIAGNOSIS — Z00.00 MEDICARE ANNUAL WELLNESS VISIT, SUBSEQUENT: Primary | ICD-10-CM

## 2025-07-28 PROCEDURE — G0439 PPPS, SUBSEQ VISIT: HCPCS | Performed by: INTERNAL MEDICINE

## 2025-07-28 PROCEDURE — 99214 OFFICE O/P EST MOD 30 MIN: CPT | Performed by: INTERNAL MEDICINE

## 2025-07-28 PROCEDURE — G2211 COMPLEX E/M VISIT ADD ON: HCPCS | Performed by: INTERNAL MEDICINE

## 2025-07-29 DIAGNOSIS — D50.9 IRON DEFICIENCY ANEMIA, UNSPECIFIED IRON DEFICIENCY ANEMIA TYPE: ICD-10-CM

## 2025-07-30 RX ORDER — FERROUS SULFATE 325(65) MG
1 TABLET ORAL
Qty: 30 TABLET | Refills: 0 | Status: SHIPPED | OUTPATIENT
Start: 2025-07-30